# Patient Record
Sex: FEMALE | Race: BLACK OR AFRICAN AMERICAN | NOT HISPANIC OR LATINO | Employment: OTHER | URBAN - METROPOLITAN AREA
[De-identification: names, ages, dates, MRNs, and addresses within clinical notes are randomized per-mention and may not be internally consistent; named-entity substitution may affect disease eponyms.]

---

## 2017-05-03 ENCOUNTER — OFFICE VISIT (OUTPATIENT)
Dept: INTERNAL MEDICINE | Facility: CLINIC | Age: 62
End: 2017-05-03
Payer: COMMERCIAL

## 2017-05-03 VITALS
DIASTOLIC BLOOD PRESSURE: 75 MMHG | HEART RATE: 78 BPM | TEMPERATURE: 98 F | WEIGHT: 260.81 LBS | RESPIRATION RATE: 16 BRPM | HEIGHT: 66 IN | SYSTOLIC BLOOD PRESSURE: 123 MMHG | BODY MASS INDEX: 41.91 KG/M2 | OXYGEN SATURATION: 98 %

## 2017-05-03 DIAGNOSIS — H01.004 BLEPHARITIS OF LEFT UPPER EYELID, UNSPECIFIED TYPE: Primary | ICD-10-CM

## 2017-05-03 DIAGNOSIS — H10.32 ACUTE BACTERIAL CONJUNCTIVITIS OF LEFT EYE: ICD-10-CM

## 2017-05-03 PROCEDURE — 1160F RVW MEDS BY RX/DR IN RCRD: CPT | Mod: S$GLB,,, | Performed by: NURSE PRACTITIONER

## 2017-05-03 PROCEDURE — 99999 PR PBB SHADOW E&M-EST. PATIENT-LVL IV: CPT | Mod: PBBFAC,,, | Performed by: NURSE PRACTITIONER

## 2017-05-03 PROCEDURE — 99203 OFFICE O/P NEW LOW 30 MIN: CPT | Mod: S$GLB,,, | Performed by: NURSE PRACTITIONER

## 2017-05-03 RX ORDER — SULFACETAMIDE SODIUM 100 MG/ML
1 SOLUTION/ DROPS OPHTHALMIC
Qty: 15 ML | Refills: 0 | Status: SHIPPED | OUTPATIENT
Start: 2017-05-03 | End: 2017-05-13

## 2017-05-03 NOTE — PROGRESS NOTES
Subjective:       Patient ID: Concepcion Don is a 61 y.o. female.    Chief Complaint: Belepharitis (lt )    Conjunctivitis   This is a new problem. The current episode started yesterday. The problem occurs constantly. The problem has been rapidly worsening. Associated symptoms include coughing (dry cough x 1 week). Pertinent negatives include no abdominal pain, anorexia, arthralgias, change in bowel habit, chest pain, chills, congestion, diaphoresis, fatigue, fever, headaches, joint swelling, myalgias, nausea, neck pain, numbness, rash, sore throat, swollen glands, urinary symptoms, vertigo, visual change, vomiting or weakness. Nothing aggravates the symptoms. She has tried nothing for the symptoms.     Review of Systems   Constitutional: Negative.  Negative for chills, diaphoresis, fatigue and fever.   HENT: Negative.  Negative for congestion and sore throat.    Eyes: Positive for pain, discharge and redness. Negative for photophobia, itching and visual disturbance.   Respiratory: Positive for cough (dry cough x 1 week). Negative for shortness of breath and wheezing.    Cardiovascular: Negative.  Negative for chest pain and palpitations.   Gastrointestinal: Negative.  Negative for abdominal pain, anorexia, change in bowel habit, nausea and vomiting.   Musculoskeletal: Negative for arthralgias, joint swelling, myalgias and neck pain.   Skin: Negative.  Negative for rash.   Neurological: Negative.  Negative for vertigo, weakness, numbness and headaches.       Objective:      Physical Exam   Constitutional: She is oriented to person, place, and time. She appears well-developed and well-nourished. No distress.   HENT:   Head: Normocephalic and atraumatic.   Right Ear: Hearing, tympanic membrane, external ear and ear canal normal.   Left Ear: Hearing, tympanic membrane, external ear and ear canal normal.   Nose: Nose normal.   Mouth/Throat: Oropharynx is clear and moist.   Eyes: EOM are normal. Pupils are equal, round,  and reactive to light. Right eye exhibits no chemosis, no discharge, no exudate and no hordeolum. No foreign body present in the right eye. Left eye exhibits discharge. Left eye exhibits no chemosis, no exudate and no hordeolum. No foreign body present in the left eye. Right conjunctiva is not injected. Right conjunctiva has no hemorrhage. Left conjunctiva is injected. Left conjunctiva has no hemorrhage. No scleral icterus.   Cardiovascular: Normal rate, regular rhythm, normal heart sounds and intact distal pulses.    Pulmonary/Chest: Effort normal and breath sounds normal. No respiratory distress. She has no wheezes.   Neurological: She is alert and oriented to person, place, and time.   Skin: Skin is warm and dry. No rash noted. She is not diaphoretic.   Psychiatric: She has a normal mood and affect. Her behavior is normal. Judgment and thought content normal.   Vitals reviewed.      Assessment:       1. Blepharitis of left upper eyelid, unspecified type    2. Acute bacterial conjunctivitis of left eye        Plan:       *Blepharitis of left upper eyelid, unspecified type/ Acute bacterial conjunctivitis of left eye  Warm compress Q2 hrs  RTC if not improving over next 24 hours  -     sulfacetamide sodium 10% (BLEPH-10) 10 % ophthalmic solution; Place 1 drop into both eyes every 3 (three) hours.  Dispense: 15 mL; Refill: 0    **

## 2017-05-03 NOTE — MR AVS SNAPSHOT
[facility]  [address] [address]  [place] [address]  Phone: [phone]                  Concepcion Don   5/3/2017 9:20 AM   Office Visit    Description:  Female : 1955   Provider:  NOY Romero,FNP-C   Department:  [facility]           Reason for Visit     Belepharitis           Diagnoses this Visit        Comments    Blepharitis of left upper eyelid, unspecified type    -  Primary     Acute bacterial conjunctivitis of left eye                To Do List           Goals (5 Years of Data)     None       These Medications        Disp Refills Start End    sulfacetamide sodium 10% (BLEPH-10) 10 % ophthalmic solution 15 mL 0 5/3/2017 2017    Place 1 drop into both eyes every 3 (three) hours. - Both Eyes    Pharmacy: [facility] [address] [place] [address] [id number] [place] [address] Ph #: [phone]         [facility]     [facility] Nurse Care Line -  Assistance  Unless otherwise directed by your provider, please contact [facility], our nurse care line that is available for  assistance.     Registered nurses in the [facility] provide: appointment scheduling, clinical advisement, health education, and other advisory services.  Call: [phone] (toll free)               Medications           Message regarding Medications     Verify the changes and/or additions to your medication regime listed below are the same as discussed with your clinician today.  If any of these changes or additions are incorrect, please notify your healthcare provider.        START taking these NEW medications        Refills    sulfacetamide sodium 10% (BLEPH-10) 10 % ophthalmic solution 0    Sig: Place 1 drop into both eyes every 3 (three) hours.    Class: Normal    Route: Both Eyes           Verify that the below list of medications is an accurate representation of the medications you are currently taking.  If none reported, the list may  "be blank. If incorrect, please contact your healthcare provider. Carry this list with you in case of emergency.           Current Medications     benazepril-hydrochlorthiazide (LOTENSIN HCT) 20-12.5 mg per tablet Take 1 tablet by mouth once daily.    levothyroxine (SYNTHROID) 25 MCG tablet Take 25 mcg by mouth once daily.    celecoxib (CELEBREX) 200 MG capsule Take 1 capsule (200 mg total) by mouth once daily.    naproxen sodium (ANAPROX) 550 MG tablet Take 1 tablet (550 mg total) by mouth 2 (two) times daily with meals.    sulfacetamide sodium 10% (BLEPH-10) 10 % ophthalmic solution Place 1 drop into both eyes every 3 (three) hours.           Clinical Reference Information           Your Vitals Were     BP Pulse Temp Resp    123/75 (BP Location: Left arm, Patient Position: Sitting, BP Method: Automatic) 78 97.6 °F (36.4 °C) (Tympanic) 16    Height Weight SpO2 BMI    5' 5.5" (1.664 m) 118.3 kg (260 lb 12.9 oz) 98% 42.74 kg/m2      Blood Pressure          Most Recent Value    BP  123/75      Allergies as of 5/3/2017     No Known Allergies      Immunizations Administered on Date of Encounter - 5/3/2017     None      [facility] Sign-Up     Activating your [facility] account is as easy as 1-2-3!     1) Visit [email or web address], select Sign Up Now, enter this activation code and your date of birth, then select Next.  [email or web address]  Expires: 6/17/2017  9:51 AM      2) Create a username and password to use when you visit [facility] in the future and select a security question in case you lose your password and select Next.    3) Enter your e-mail address and click Sign Up!    Additional Information  If you have questions, please e-mail [email or web address] or call [phone] to talk to our [facility] staff. Remember, [facility] is NOT to be used for urgent needs. For medical emergencies, dial 911.         Instructions      Blepharitis    Blepharitis is an inflammation of the eyelid. It results in swelling of the eyelids, and " it is usually caused by a bacterial infection or a skin condition. Blepharitis is a common eye condition. There are two types. Anterior blepharitis occurs where the eyelashes are attached (outside front edge of the eye). Posterior blepharitis affects the inner edge of the eyelid that touches the eyeball.  In addition to swollen eyelids, symptoms of blepharitis can include thick, yellow, dandruff-like scales that stick to the eyelid. There may be oily patches on the eyelid. The eyelashes may be crusted (with dandruff-like scales) when you wake up from sleeping. The irritated area may itch. The eyelids may be red. The eyes can be red and burn or sting. The eyes may tear a lot, or be dry. You can become sensitive to light or have blurred vision. Symptoms of blepharitis can cause irritability.  Blepharitis is a chronic condition and difficult to cure. Even with successful treatment, recurrences are common. Good hygiene and home treatments (in the Home care section below) can improve your condition.  Causes  Causes of blepharitis may include:  · Problems with the oil glands in the eyelid (meibomian glands)  · Dandruff of the scalp and eyebrows (seborrheic dermatitis)  · Acne rosacea (a skin condition that causes redness of the face, and other symptoms)  · Eyelash mites (tiny organisms in the eyelash follicles)  · Allergic reactions to cosmetics or medicines  Home care  Medicine: The healthcare provider may prescribe an antibiotic eye drops or ointment, artificial tears, and/or steroid eye drops. Follow all instructions for using these medicines. Use all medicines as directed. If you have pain, take medicine as advised by the healthcare provider.  · Wash your hands carefully with soap and warm water before and after caring for your eyes.  · Apply a warm compress or a warm, moist washcloth to the eyelids for 1 minute, 2 to 3 times a day, to loosen the crust. Then, wipe away scales or crust from the eyelids.  · After  applying the warm compress, gently scrub the base of the eyelashes for almost 15 seconds per eyelid. To do this, close your eyes and use a moist eyelid cleansing wipe, clean washcloth, or cotton swab. Ask your healthcare provider about products (such as nonirritating baby shampoo) to use to help clean the eyelids.  · You may be instructed to gently massage your eyelids to help unblock the eyelid glands. Follow all instructions given by the healthcare provider.  · Unless told otherwise, on a regular basis, with eyes closed, clean your eyelids as directed by the healthcare provider. Blepharitis can be an ongoing problem.  · Do not wear eye makeup until the inflammation goes away, or as directed by your healthcare provider.  · Unless told otherwise, stop using contact lenses until you complete treatment for the condition.  · Wash your hands regularly to help prevent dirt and bacteria from coming in contact with your eyelid.  Follow-up care  Follow up with your healthcare provider, or as advised. Your healthcare provider may refer you to an eye specialist (an optometrist or ophthalmologist) for further evaluation and treatment.  When to seek medical advice  Call your healthcare provider right away if any of these occur:  · Increase in redness of the white part of the eye  · Increase in swelling, redness, irritation, or pain of the eyelids  · Eye pain  · Change in vision (trouble seeing or blurring)  · Drainage (pus, blood) from the eyelid  · Fever of 100.4ºF (38ºC) or higher, or as directed by your healthcare provider  Date Last Reviewed: 10/9/2015  © 0400-4421 [employer]. [address] [place] [address], [place] [address]. All rights reserved. This information is not intended as a substitute for professional medical care. Always follow your healthcare professional's instructions.        Conjunctivitis, Nonspecific    The membrane that covers the white part of your eye (the conjunctiva) is inflamed. Inflammation  happens when your body responds to an injury, allergic reaction, infection, or illness. Symptoms of inflammation in the eye may include redness, irritation, itching, swelling, or burning. These symptoms should go away within the next 24 hours. Conjunctivitis may be related to a particle that was in your eye. If so, it may wash out with your tears or irrigation treatment. Being exposed to liquid chemicals or fumes may also cause this reaction.   Home care  · Apply a cold pack (ice in a plastic bag, wrapped in a towel) over the eye for 20 minutes at a time. This will reduce pain.  · Artificial tears may be prescribed to reduce irritation or redness.  These should be used 3 to 4 times a day.  · You may use acetaminophen or ibuprofen to control pain, unless another medicine was prescribed.(Note: If you have chronic liver or kidney disease, or if you have ever had a stomach ulcer or gastrointestinal bleeding, talk with your healthcare provider before using these medicines.)  Follow-up care  Follow up with your healthcare provider, or as advised.  When to seek medical advice  Call your healthcare provider right away if any of these occur:  · Increased eyelid swelling  · Increased eye pain  · Increased redness or drainage from the eye  · Increased blurry vision or increased sensitivity to light  · Failure of normal vision to return within 24 to 48 hours  Date Last Reviewed: 6/14/2015  © 4222-0332 [employer]. [address] [place] [address], [place] [address]. All rights reserved. This information is not intended as a substitute for professional medical care. Always follow your healthcare professional's instructions.             Language Assistance Services     ATTENTION: Language assistance services are available, free of charge. Please call [phone].      ATENCIÓN: Si habla natasha, tiene a ruano disposición servicios gratuitos de asistencia lingüística. Llame al [phone].     SKYE Ý: N?u b?n nói Ti?ng Vi?t,  có các d?ch v? h? tr? ngôn ng? mi?n phí dành cho b?n. G?i s? [phone].         [facility] complies with applicable Federal civil rights laws and does not discriminate on the basis of race, color, national origin, age, disability, or sex.

## 2017-05-03 NOTE — PATIENT INSTRUCTIONS
Blepharitis    Blepharitis is an inflammation of the eyelid. It results in swelling of the eyelids, and it is usually caused by a bacterial infection or a skin condition. Blepharitis is a common eye condition. There are two types. Anterior blepharitis occurs where the eyelashes are attached (outside front edge of the eye). Posterior blepharitis affects the inner edge of the eyelid that touches the eyeball.  In addition to swollen eyelids, symptoms of blepharitis can include thick, yellow, dandruff-like scales that stick to the eyelid. There may be oily patches on the eyelid. The eyelashes may be crusted (with dandruff-like scales) when you wake up from sleeping. The irritated area may itch. The eyelids may be red. The eyes can be red and burn or sting. The eyes may tear a lot, or be dry. You can become sensitive to light or have blurred vision. Symptoms of blepharitis can cause irritability.  Blepharitis is a chronic condition and difficult to cure. Even with successful treatment, recurrences are common. Good hygiene and home treatments (in the Home care section below) can improve your condition.  Causes  Causes of blepharitis may include:  · Problems with the oil glands in the eyelid (meibomian glands)  · Dandruff of the scalp and eyebrows (seborrheic dermatitis)  · Acne rosacea (a skin condition that causes redness of the face, and other symptoms)  · Eyelash mites (tiny organisms in the eyelash follicles)  · Allergic reactions to cosmetics or medicines  Home care  Medicine: The healthcare provider may prescribe an antibiotic eye drops or ointment, artificial tears, and/or steroid eye drops. Follow all instructions for using these medicines. Use all medicines as directed. If you have pain, take medicine as advised by the healthcare provider.  · Wash your hands carefully with soap and warm water before and after caring for your eyes.  · Apply a warm compress or a warm, moist washcloth to the eyelids for 1 minute,  2 to 3 times a day, to loosen the crust. Then, wipe away scales or crust from the eyelids.  · After applying the warm compress, gently scrub the base of the eyelashes for almost 15 seconds per eyelid. To do this, close your eyes and use a moist eyelid cleansing wipe, clean washcloth, or cotton swab. Ask your healthcare provider about products (such as nonirritating baby shampoo) to use to help clean the eyelids.  · You may be instructed to gently massage your eyelids to help unblock the eyelid glands. Follow all instructions given by the healthcare provider.  · Unless told otherwise, on a regular basis, with eyes closed, clean your eyelids as directed by the healthcare provider. Blepharitis can be an ongoing problem.  · Do not wear eye makeup until the inflammation goes away, or as directed by your healthcare provider.  · Unless told otherwise, stop using contact lenses until you complete treatment for the condition.  · Wash your hands regularly to help prevent dirt and bacteria from coming in contact with your eyelid.  Follow-up care  Follow up with your healthcare provider, or as advised. Your healthcare provider may refer you to an eye specialist (an optometrist or ophthalmologist) for further evaluation and treatment.  When to seek medical advice  Call your healthcare provider right away if any of these occur:  · Increase in redness of the white part of the eye  · Increase in swelling, redness, irritation, or pain of the eyelids  · Eye pain  · Change in vision (trouble seeing or blurring)  · Drainage (pus, blood) from the eyelid  · Fever of 100.4ºF (38ºC) or higher, or as directed by your healthcare provider  Date Last Reviewed: 10/9/2015  © 3742-5199 [employer]. [address] [place] [address], [place] [address]. All rights reserved. This information is not intended as a substitute for professional medical care. Always follow your healthcare professional's instructions.        Conjunctivitis,  Nonspecific    The membrane that covers the white part of your eye (the conjunctiva) is inflamed. Inflammation happens when your body responds to an injury, allergic reaction, infection, or illness. Symptoms of inflammation in the eye may include redness, irritation, itching, swelling, or burning. These symptoms should go away within the next 24 hours. Conjunctivitis may be related to a particle that was in your eye. If so, it may wash out with your tears or irrigation treatment. Being exposed to liquid chemicals or fumes may also cause this reaction.   Home care  · Apply a cold pack (ice in a plastic bag, wrapped in a towel) over the eye for 20 minutes at a time. This will reduce pain.  · Artificial tears may be prescribed to reduce irritation or redness.  These should be used 3 to 4 times a day.  · You may use acetaminophen or ibuprofen to control pain, unless another medicine was prescribed.(Note: If you have chronic liver or kidney disease, or if you have ever had a stomach ulcer or gastrointestinal bleeding, talk with your healthcare provider before using these medicines.)  Follow-up care  Follow up with your healthcare provider, or as advised.  When to seek medical advice  Call your healthcare provider right away if any of these occur:  · Increased eyelid swelling  · Increased eye pain  · Increased redness or drainage from the eye  · Increased blurry vision or increased sensitivity to light  · Failure of normal vision to return within 24 to 48 hours  Date Last Reviewed: 6/14/2015  © 8349-2428 [employer]. [address] [place] [address], [place] [address]. All rights reserved. This information is not intended as a substitute for professional medical care. Always follow your healthcare professional's instructions.

## 2017-06-14 ENCOUNTER — OFFICE VISIT (OUTPATIENT)
Dept: INTERNAL MEDICINE | Facility: CLINIC | Age: 62
End: 2017-06-14
Payer: COMMERCIAL

## 2017-06-14 VITALS
WEIGHT: 258.19 LBS | OXYGEN SATURATION: 96 % | BODY MASS INDEX: 43.02 KG/M2 | DIASTOLIC BLOOD PRESSURE: 72 MMHG | RESPIRATION RATE: 20 BRPM | HEIGHT: 65 IN | TEMPERATURE: 98 F | HEART RATE: 87 BPM | SYSTOLIC BLOOD PRESSURE: 102 MMHG

## 2017-06-14 DIAGNOSIS — H00.011 HORDEOLUM EXTERNUM OF RIGHT UPPER EYELID: Primary | ICD-10-CM

## 2017-06-14 PROCEDURE — 99213 OFFICE O/P EST LOW 20 MIN: CPT | Mod: S$GLB,,, | Performed by: NURSE PRACTITIONER

## 2017-06-14 PROCEDURE — 99999 PR PBB SHADOW E&M-EST. PATIENT-LVL IV: CPT | Mod: PBBFAC,,, | Performed by: NURSE PRACTITIONER

## 2017-06-14 RX ORDER — SULFACETAMIDE SODIUM 100 MG/ML
1 SOLUTION/ DROPS OPHTHALMIC
Qty: 15 ML | Refills: 0 | Status: SHIPPED | OUTPATIENT
Start: 2017-06-14 | End: 2017-10-31 | Stop reason: ALTCHOICE

## 2017-06-14 NOTE — PATIENT INSTRUCTIONS
Sty (or Stye)  A sty is an infection of the oil gland of the eyelid. It may develop into a small pocket of pus (abscess). This can cause pain, redness, and swelling. In early stages, styes are treated with antibiotic cream, eye drops, or warm packs (small towels soaked in warm water). More severe cases may need to be opened and drained by a health care provider.  Home care  · Eye drops or ointment are usually prescribed to treat the infection. Use these as directed.   ¨ Artificial tears may also be used to lubricate the eye and make it more comfortable. These may be purchased without a prescription.   ¨ Talk to your health care provider before using any over-the-counter treatment for a sty.  · Apply a warm, damp towel to the affected eye for at least 5 minutes, 3 to 4 times a day for a week. Warm compresses open the pores and speed the healing. If the compresses are too hot, they may burn your eyelid.  · Sometimes the sty will drain with this treatment alone. If this happens, continue the antibiotic until all the redness and swelling are gone.  · Wash your hands before and after touching the infected eye to avoid spreading the infection.  · Do not squeeze or try to puncture the sty.  Follow-up care  Follow up with your health care provider, or as advised.   When to seek medical advice  Call your health care provider right away if you have:  · Increase in swelling or redness around the eyelid after 48 to 72 hours  · Increase in eye pain or the eyelid blisters  · Increase in warmth--the eyelid feels hot  · Drainage of blood or thick pus from the sty  · Blister on the eyelid  · Inability to open the eyelid due to swelling  · Fever  ¨ 1 degree above your normal temperature lasting for 24 to 48 hours, or  ¨ Whatever your health care provider told you to report based on your medical condition  · Vision changes  · Headache or stiff neck  · Recurrence of the sty  Date Last Reviewed: 6/14/2015  © 7109-2572 [employer]. [address] [place] [address], [place] [address]. All rights reserved. This information is not intended as a substitute for professional medical care. Always follow your healthcare professional's instructions.         Discharged

## 2017-06-14 NOTE — PROGRESS NOTES
Subjective:       Patient ID: Concepcion Don is a 61 y.o. female.    Chief Complaint: Stye (to R eye since Monday. Pt placed a warm compress last night and noted some drainage. Denies pain. )    Conjunctivitis   This is a new problem. The current episode started yesterday. The problem occurs constantly. The problem has been gradually worsening. Pertinent negatives include no congestion, coughing, diaphoresis, fatigue, fever, headaches, nausea, rash, sore throat, swollen glands or visual change. Nothing aggravates the symptoms. Treatments tried: warm compress. The treatment provided mild relief.     Review of Systems   Constitutional: Negative for diaphoresis, fatigue and fever.   HENT: Negative for congestion and sore throat.    Respiratory: Negative for cough.    Gastrointestinal: Negative for nausea.   Skin: Negative for rash.   Neurological: Negative for headaches.       Objective:      Physical Exam   Constitutional: She is oriented to person, place, and time. She appears well-developed. No distress.   obese   HENT:   Head: Normocephalic.   Right Ear: External ear normal.   Left Ear: External ear normal.   Nose: Nose normal.   Mouth/Throat: Oropharynx is clear and moist. No oropharyngeal exudate.   Eyes: EOM are normal. Pupils are equal, round, and reactive to light. Lids are everted and swept, no foreign bodies found. Right eye exhibits discharge and hordeolum. Right eye exhibits no exudate. Left eye exhibits no chemosis, no exudate and no hordeolum. Right conjunctiva is injected. Left conjunctiva is not injected. Left conjunctiva has no hemorrhage.   Neck: Normal range of motion.   Lymphadenopathy:     She has no cervical adenopathy.   Neurological: She is alert and oriented to person, place, and time.   Skin: Skin is warm and dry. No rash noted. She is not diaphoretic.       Assessment:       1. Hordeolum externum of right upper eyelid        Plan:       *Hordeolum externum of right upper eyelid  Warm compress  for 10 mins TID prior to drops  RTC if not improving in next 24 hours  -     sulfacetamide sodium 10% (BLEPH-10) 10 % ophthalmic solution; Place 1 drop into the right eye every 2 (two) hours.  Dispense: 15 mL; Refill: 0    **

## 2017-08-28 ENCOUNTER — TELEPHONE (OUTPATIENT)
Dept: URGENT CARE | Facility: CLINIC | Age: 62
End: 2017-08-28

## 2017-10-31 ENCOUNTER — OFFICE VISIT (OUTPATIENT)
Dept: INTERNAL MEDICINE | Facility: CLINIC | Age: 62
End: 2017-10-31
Payer: COMMERCIAL

## 2017-10-31 VITALS
WEIGHT: 264.31 LBS | RESPIRATION RATE: 18 BRPM | HEIGHT: 66 IN | SYSTOLIC BLOOD PRESSURE: 110 MMHG | BODY MASS INDEX: 42.48 KG/M2 | DIASTOLIC BLOOD PRESSURE: 72 MMHG | OXYGEN SATURATION: 98 % | TEMPERATURE: 96 F | HEART RATE: 74 BPM

## 2017-10-31 DIAGNOSIS — Z28.9 DELAYED IMMUNIZATIONS: ICD-10-CM

## 2017-10-31 DIAGNOSIS — I10 ESSENTIAL HYPERTENSION: ICD-10-CM

## 2017-10-31 DIAGNOSIS — J01.00 ACUTE NON-RECURRENT MAXILLARY SINUSITIS: Primary | ICD-10-CM

## 2017-10-31 PROCEDURE — 99214 OFFICE O/P EST MOD 30 MIN: CPT | Mod: 25,S$GLB,, | Performed by: FAMILY MEDICINE

## 2017-10-31 PROCEDURE — 90686 IIV4 VACC NO PRSV 0.5 ML IM: CPT | Mod: S$GLB,,, | Performed by: FAMILY MEDICINE

## 2017-10-31 PROCEDURE — 90471 IMMUNIZATION ADMIN: CPT | Mod: S$GLB,,, | Performed by: FAMILY MEDICINE

## 2017-10-31 PROCEDURE — 99999 PR PBB SHADOW E&M-EST. PATIENT-LVL III: CPT | Mod: PBBFAC,,, | Performed by: FAMILY MEDICINE

## 2017-10-31 RX ORDER — AMOXICILLIN AND CLAVULANATE POTASSIUM 875; 125 MG/1; MG/1
1 TABLET, FILM COATED ORAL EVERY 12 HOURS
Qty: 14 TABLET | Refills: 0 | Status: SHIPPED | OUTPATIENT
Start: 2017-10-31 | End: 2017-11-29 | Stop reason: ALTCHOICE

## 2017-10-31 NOTE — ASSESSMENT & PLAN NOTE
Augmentin, mucinex -d, nyquil.  Watch bp.  Rest, Increase Fluids, Tylenol or Motrin for fever or aches  Warm Compresses Over Sinuses Twice a Day

## 2017-10-31 NOTE — PROGRESS NOTES
Subjective:       Patient ID: Concepcion Don is a 62 y.o. female.    Chief Complaint: Sinus Problem; Headache; and Otalgia    Sinus Problem   This is a new problem. The current episode started 1 to 4 weeks ago. The problem has been gradually worsening since onset. There has been no fever. The pain is moderate. Associated symptoms include congestion, ear pain, headaches, sinus pressure and a sore throat. Pertinent negatives include no chills, coughing, neck pain or shortness of breath. Treatments tried: allegra.   Headache    Associated symptoms include ear pain, sinus pressure and a sore throat. Pertinent negatives include no coughing or neck pain.   Otalgia    Associated symptoms include headaches and a sore throat. Pertinent negatives include no coughing or neck pain.     Review of Systems   Constitutional: Negative for chills.   HENT: Positive for congestion, ear pain, sinus pressure and sore throat.    Respiratory: Negative for cough and shortness of breath.    Musculoskeletal: Negative for neck pain.   Neurological: Positive for headaches.       Objective:      Physical Exam   Constitutional: She appears well-developed and well-nourished. She appears distressed.   HENT:   Head: Normocephalic and atraumatic.   Right Ear: Hearing, tympanic membrane and ear canal normal.   Left Ear: Hearing, tympanic membrane and ear canal normal.   Nose: Nose normal.   Mouth/Throat: Oropharynx is clear and moist.   ttp maxillary and frontal sinuses   Pulmonary/Chest: Effort normal and breath sounds normal. No respiratory distress. She has no wheezes.   Skin: Skin is warm and dry. No rash noted. She is not diaphoretic. No erythema.   Nursing note and vitals reviewed.      Assessment:       1. Acute non-recurrent maxillary sinusitis    2. Essential hypertension    3. Class 3 obesity due to excess calories with serious comorbidity and body mass index (BMI) of 40.0 to 44.9 in adult    4. Delayed immunizations        Plan:            Acute non-recurrent maxillary sinusitis  Augmentin, mucinex -d, nyquil.  Watch bp.  Rest, Increase Fluids, Tylenol or Motrin for fever or aches  Warm Compresses Over Sinuses Twice a Day

## 2017-11-13 ENCOUNTER — PATIENT OUTREACH (OUTPATIENT)
Dept: ADMINISTRATIVE | Facility: HOSPITAL | Age: 62
End: 2017-11-13

## 2017-11-16 ENCOUNTER — PATIENT OUTREACH (OUTPATIENT)
Dept: ADMINISTRATIVE | Facility: HOSPITAL | Age: 62
End: 2017-11-16

## 2017-11-20 RX ORDER — LEVOTHYROXINE SODIUM 25 UG/1
25 TABLET ORAL DAILY
Qty: 90 TABLET | Refills: 0 | Status: SHIPPED | OUTPATIENT
Start: 2017-11-20 | End: 2018-04-27 | Stop reason: SDUPTHER

## 2017-11-20 NOTE — TELEPHONE ENCOUNTER
----- Message from Lori Rhodes sent at 11/20/2017  8:05 AM CST -----  Contact: pt  The pt request a call concerning a medication refill, pt can be reached at [phone]///[id number]

## 2017-11-22 ENCOUNTER — PATIENT OUTREACH (OUTPATIENT)
Dept: ADMINISTRATIVE | Facility: HOSPITAL | Age: 62
End: 2017-11-22

## 2017-11-29 ENCOUNTER — OFFICE VISIT (OUTPATIENT)
Dept: INTERNAL MEDICINE | Facility: CLINIC | Age: 62
End: 2017-11-29
Payer: COMMERCIAL

## 2017-11-29 ENCOUNTER — LAB VISIT (OUTPATIENT)
Dept: LAB | Facility: HOSPITAL | Age: 62
End: 2017-11-29
Attending: FAMILY MEDICINE
Payer: COMMERCIAL

## 2017-11-29 VITALS
DIASTOLIC BLOOD PRESSURE: 80 MMHG | RESPIRATION RATE: 18 BRPM | SYSTOLIC BLOOD PRESSURE: 110 MMHG | OXYGEN SATURATION: 99 % | BODY MASS INDEX: 42.41 KG/M2 | HEART RATE: 67 BPM | TEMPERATURE: 96 F | HEIGHT: 66 IN | WEIGHT: 263.88 LBS

## 2017-11-29 DIAGNOSIS — G89.29 CHRONIC PAIN OF LEFT KNEE: ICD-10-CM

## 2017-11-29 DIAGNOSIS — Z12.31 SCREENING MAMMOGRAM, ENCOUNTER FOR: ICD-10-CM

## 2017-11-29 DIAGNOSIS — Z28.9 DELAYED IMMUNIZATIONS: ICD-10-CM

## 2017-11-29 DIAGNOSIS — M25.562 CHRONIC PAIN OF LEFT KNEE: ICD-10-CM

## 2017-11-29 DIAGNOSIS — Z00.00 ROUTINE GENERAL MEDICAL EXAMINATION AT A HEALTH CARE FACILITY: ICD-10-CM

## 2017-11-29 DIAGNOSIS — E07.9 THYROID DISEASE: ICD-10-CM

## 2017-11-29 DIAGNOSIS — Z78.0 POSTMENOPAUSAL: ICD-10-CM

## 2017-11-29 DIAGNOSIS — Z00.00 ROUTINE GENERAL MEDICAL EXAMINATION AT A HEALTH CARE FACILITY: Primary | ICD-10-CM

## 2017-11-29 LAB
ALBUMIN SERPL BCP-MCNC: 3.3 G/DL
ALP SERPL-CCNC: 110 U/L
ALT SERPL W/O P-5'-P-CCNC: 25 U/L
ANION GAP SERPL CALC-SCNC: 9 MMOL/L
AST SERPL-CCNC: 32 U/L
BASOPHILS # BLD AUTO: 0.02 K/UL
BASOPHILS NFR BLD: 0.3 %
BILIRUB SERPL-MCNC: 0.6 MG/DL
BUN SERPL-MCNC: 17 MG/DL
CALCIUM SERPL-MCNC: 9.3 MG/DL
CHLORIDE SERPL-SCNC: 105 MMOL/L
CHOLEST SERPL-MCNC: 182 MG/DL
CHOLEST/HDLC SERPL: 4.2 {RATIO}
CO2 SERPL-SCNC: 26 MMOL/L
CREAT SERPL-MCNC: 0.8 MG/DL
DIFFERENTIAL METHOD: ABNORMAL
EOSINOPHIL # BLD AUTO: 0.1 K/UL
EOSINOPHIL NFR BLD: 1.6 %
ERYTHROCYTE [DISTWIDTH] IN BLOOD BY AUTOMATED COUNT: 14.7 %
EST. GFR  (AFRICAN AMERICAN): >60 ML/MIN/1.73 M^2
EST. GFR  (NON AFRICAN AMERICAN): >60 ML/MIN/1.73 M^2
GLUCOSE SERPL-MCNC: 95 MG/DL
HCT VFR BLD AUTO: 37.7 %
HDLC SERPL-MCNC: 43 MG/DL
HDLC SERPL: 23.6 %
HGB BLD-MCNC: 12 G/DL
LDLC SERPL CALC-MCNC: 123.6 MG/DL
LYMPHOCYTES # BLD AUTO: 2.9 K/UL
LYMPHOCYTES NFR BLD: 41.1 %
MCH RBC QN AUTO: 27.8 PG
MCHC RBC AUTO-ENTMCNC: 31.8 G/DL
MCV RBC AUTO: 88 FL
MONOCYTES # BLD AUTO: 0.8 K/UL
MONOCYTES NFR BLD: 11.6 %
NEUTROPHILS # BLD AUTO: 3.2 K/UL
NEUTROPHILS NFR BLD: 45.3 %
NONHDLC SERPL-MCNC: 139 MG/DL
PLATELET # BLD AUTO: 222 K/UL
PMV BLD AUTO: 10.6 FL
POTASSIUM SERPL-SCNC: 4.3 MMOL/L
PROT SERPL-MCNC: 8.5 G/DL
RBC # BLD AUTO: 4.31 M/UL
SODIUM SERPL-SCNC: 140 MMOL/L
TRIGL SERPL-MCNC: 77 MG/DL
TSH SERPL DL<=0.005 MIU/L-ACNC: 1.48 UIU/ML
WBC # BLD AUTO: 6.98 K/UL

## 2017-11-29 PROCEDURE — 90471 IMMUNIZATION ADMIN: CPT | Mod: S$GLB,,, | Performed by: FAMILY MEDICINE

## 2017-11-29 PROCEDURE — 36415 COLL VENOUS BLD VENIPUNCTURE: CPT | Mod: PO

## 2017-11-29 PROCEDURE — 99999 PR PBB SHADOW E&M-EST. PATIENT-LVL IV: CPT | Mod: PBBFAC,,, | Performed by: FAMILY MEDICINE

## 2017-11-29 PROCEDURE — 90715 TDAP VACCINE 7 YRS/> IM: CPT | Mod: S$GLB,,, | Performed by: FAMILY MEDICINE

## 2017-11-29 PROCEDURE — 85025 COMPLETE CBC W/AUTO DIFF WBC: CPT | Mod: PO

## 2017-11-29 PROCEDURE — 99396 PREV VISIT EST AGE 40-64: CPT | Mod: 25,S$GLB,, | Performed by: FAMILY MEDICINE

## 2017-11-29 PROCEDURE — 80061 LIPID PANEL: CPT

## 2017-11-29 PROCEDURE — 80053 COMPREHEN METABOLIC PANEL: CPT | Mod: PO

## 2017-11-29 PROCEDURE — 84443 ASSAY THYROID STIM HORMONE: CPT | Mod: PO

## 2017-11-29 PROCEDURE — 86803 HEPATITIS C AB TEST: CPT

## 2017-11-29 RX ORDER — ASPIRIN 81 MG/1
81 TABLET ORAL DAILY
Qty: 365 TABLET | Refills: 0 | Status: SHIPPED | OUTPATIENT
Start: 2017-11-29 | End: 2017-12-07

## 2017-11-29 RX ORDER — CALCIUM CARBONATE 500(1250)
2 TABLET ORAL DAILY
Qty: 180 TABLET | Refills: 3 | Status: SHIPPED | OUTPATIENT
Start: 2017-11-29 | End: 2019-05-15

## 2017-11-29 NOTE — PROGRESS NOTES
Subjective:       Patient ID: Concepcion Don is a 62 y.o. female.    Chief Complaint: Annual Exam and Nasal Congestion    Subjective:     Concepcion Don is a 62 y.o. female and is here for a comprehensive physical exam. The patient reports problems - knee pain.    Do you take any herbs or supplements that were not prescribed by a doctor? no  Are you taking calcium supplements? no  Are you taking aspirin daily? yes     History:  Any STD's in the past? none    The following portions of the patient's history were reviewed and updated as appropriate: allergies, current medications, past family history, past medical history, past social history, past surgical history and problem list.    Review of Systems  Do you have pain that bothers you in your daily life? Knee pain, had synvisc in past.  Pertinent items are noted in HPI.    2. Patient Counseling:  --Nutrition: Stressed importance of moderation in sodium/caffeine intake, saturated fat and cholesterol, caloric balance, sufficient intake of fresh fruits, vegetables, fiber, calcium, iron, and 1 mg of folate supplement per day (for females capable of pregnancy).  --Discussed the issue of estrogen replacement, calcium supplement, and the daily use of baby aspirin.  --Exercise: Stressed the importance of regular exercise.   --Substance Abuse: Discussed cessation/primary prevention of tobacco, alcohol, or other drug use; driving or other dangerous activities under the influence; availability of treatment for abuse.    --Sexuality: Discussed sexually transmitted diseases, partner selection, use of condoms, avoidance of unintended pregnancy  and contraceptive alternatives.   --Injury prevention: Discussed safety belts, safety helmets, smoke detector, smoking near bedding or upholstery.   --Dental health: Discussed importance of regular tooth brushing, flossing, and dental visits.  --Immunizations reviewed.  --Discussed benefits of screening colonoscopy.  --After hours service  discussed with patient    3. Discussed the patient's BMI with her.  The BMI elevated, The patient is asked to make an attempt to improve diet and exercise patterns to aid in medical management of this problem.  .  4. Follow up as needed for acute illness        Review of Systems   Respiratory: Negative for shortness of breath.    Cardiovascular: Negative for chest pain.   Gastrointestinal: Negative for abdominal pain.       Objective:      Physical Exam   Constitutional: She appears well-developed and well-nourished. No distress.   HENT:   Head: Normocephalic and atraumatic.   Nose: Nose normal.   Mouth/Throat: Oropharynx is clear and moist.   Eyes: EOM are normal. Pupils are equal, round, and reactive to light.   Cardiovascular: Normal rate, regular rhythm, normal heart sounds and intact distal pulses.    Pulmonary/Chest: Effort normal and breath sounds normal. No respiratory distress. She has no wheezes.   Musculoskeletal: Normal range of motion. She exhibits no edema.   Neurological: She is alert.   Skin: Skin is warm and dry. No rash noted. She is not diaphoretic. No erythema.   Nursing note and vitals reviewed.      Assessment:       1. Routine general medical examination at a health care facility    2. Delayed immunizations    3. Postmenopausal    4. Thyroid disease    5. Screening mammogram, encounter for    6. Chronic pain of left knee        Plan:           Routine general medical examination at a health care facility  Vit d 2000 iu per day. 1200 mg of calcium daily  See note    Chronic pain of left knee  Chronic issue.  After long d/w pt, she will f/u in about a week to discuss weight bearing xr, and need for injection versus if she wishes a referral to ortho for synvisc vs surgery.    Routine general medical examination at a health care facility  -     Hepatitis C antibody; Future; Expected date: 11/29/2017  -     Lipid panel; Future; Expected date: 11/29/2017  -     CBC auto differential; Future;  Expected date: 11/29/2017  -     Comprehensive metabolic panel; Future; Expected date: 11/29/2017    Delayed immunizations  -     Tdap Vaccine    Postmenopausal  -     DXA Bone Density Spine And Hip; Future    Thyroid disease  -     TSH; Future; Expected date: 11/29/2017    Screening mammogram, encounter for  -     Mammo Digital Screening Bilat without CA; Future; Expected date: 11/29/2017    Chronic pain of left knee    Other orders  -     Cancel: Lipid panel; Future  -     Cancel: Hepatitis C antibody; Future  -     Cancel: Mammo Digital Screening Bilat with CAD; Future  -     calcium carbonate (OS-JENNIFER) 500 mg calcium (1,250 mg) tablet; Take 2 tablets (1,000 mg total) by mouth once daily.  Dispense: 180 tablet; Refill: 3  -     aspirin (ECOTRIN) 81 MG EC tablet; Take 1 tablet (81 mg total) by mouth once daily.  Dispense: 365 tablet; Refill: 0

## 2017-11-30 LAB — HCV AB SERPL QL IA: NEGATIVE

## 2017-12-07 ENCOUNTER — HOSPITAL ENCOUNTER (OUTPATIENT)
Dept: RADIOLOGY | Facility: HOSPITAL | Age: 62
Discharge: HOME | End: 2017-12-07
Attending: FAMILY MEDICINE
Payer: COMMERCIAL

## 2017-12-07 ENCOUNTER — OFFICE VISIT (OUTPATIENT)
Dept: INTERNAL MEDICINE | Facility: CLINIC | Age: 62
End: 2017-12-07
Payer: COMMERCIAL

## 2017-12-07 VITALS
BODY MASS INDEX: 42.66 KG/M2 | HEIGHT: 66 IN | WEIGHT: 265.44 LBS | SYSTOLIC BLOOD PRESSURE: 100 MMHG | RESPIRATION RATE: 18 BRPM | DIASTOLIC BLOOD PRESSURE: 72 MMHG | OXYGEN SATURATION: 98 % | TEMPERATURE: 97 F | HEART RATE: 66 BPM

## 2017-12-07 DIAGNOSIS — G89.29 CHRONIC PAIN OF LEFT KNEE: ICD-10-CM

## 2017-12-07 DIAGNOSIS — M25.562 CHRONIC PAIN OF LEFT KNEE: ICD-10-CM

## 2017-12-07 DIAGNOSIS — Z28.9 DELAYED IMMUNIZATIONS: ICD-10-CM

## 2017-12-07 DIAGNOSIS — I10 ESSENTIAL HYPERTENSION: Primary | ICD-10-CM

## 2017-12-07 PROCEDURE — 20610 DRAIN/INJ JOINT/BURSA W/O US: CPT | Mod: LT,S$GLB,, | Performed by: FAMILY MEDICINE

## 2017-12-07 PROCEDURE — 90736 HZV VACCINE LIVE SUBQ: CPT | Mod: S$GLB,,, | Performed by: FAMILY MEDICINE

## 2017-12-07 PROCEDURE — 99999 PR PBB SHADOW E&M-EST. PATIENT-LVL III: CPT | Mod: PBBFAC,,, | Performed by: FAMILY MEDICINE

## 2017-12-07 PROCEDURE — 99214 OFFICE O/P EST MOD 30 MIN: CPT | Mod: 25,S$GLB,, | Performed by: FAMILY MEDICINE

## 2017-12-07 PROCEDURE — 73562 X-RAY EXAM OF KNEE 3: CPT | Mod: 26,50,, | Performed by: RADIOLOGY

## 2017-12-07 PROCEDURE — 90471 IMMUNIZATION ADMIN: CPT | Mod: S$GLB,,, | Performed by: FAMILY MEDICINE

## 2017-12-07 PROCEDURE — 73562 X-RAY EXAM OF KNEE 3: CPT | Mod: TC,50,PO

## 2017-12-07 RX ORDER — BENAZEPRIL HYDROCHLORIDE AND HYDROCHLOROTHIAZIDE 20; 12.5 MG/1; MG/1
1 TABLET ORAL DAILY
Qty: 90 TABLET | Refills: 3 | Status: SHIPPED | OUTPATIENT
Start: 2017-12-07 | End: 2018-12-27 | Stop reason: SDUPTHER

## 2017-12-07 RX ORDER — BETAMETHASONE SODIUM PHOSPHATE AND BETAMETHASONE ACETATE 3; 3 MG/ML; MG/ML
6 INJECTION, SUSPENSION INTRA-ARTICULAR; INTRALESIONAL; INTRAMUSCULAR; SOFT TISSUE
Status: DISCONTINUED | OUTPATIENT
Start: 2017-12-07 | End: 2017-12-08 | Stop reason: HOSPADM

## 2017-12-07 RX ORDER — ASPIRIN 325 MG
81 TABLET ORAL DAILY
COMMUNITY
End: 2019-04-10

## 2017-12-07 RX ADMIN — BETAMETHASONE SODIUM PHOSPHATE AND BETAMETHASONE ACETATE 6 MG: 3; 3 INJECTION, SUSPENSION INTRA-ARTICULAR; INTRALESIONAL; INTRAMUSCULAR; SOFT TISSUE at 04:12

## 2017-12-07 NOTE — PROGRESS NOTES
Subjective:       Patient ID: Concepcion Don is a 62 y.o. female.    Chief Complaint: Follow-up (left knee pain)    HTN: chronic, stable, compliant on meds,  Denies blurred vision or headache.      Knee Pain    Incident onset: chronic knee pain. There was no injury mechanism. The pain is present in the left knee. The quality of the pain is described as aching. The pain is moderate. The pain has been fluctuating since onset. Pertinent negatives include no inability to bear weight, loss of motion, loss of sensation, muscle weakness, numbness or tingling. She reports no foreign bodies present. The symptoms are aggravated by movement and weight bearing. She has tried acetaminophen for the symptoms. The treatment provided moderate relief.     Review of Systems   Constitutional: Negative for activity change and unexpected weight change.   HENT: Negative for hearing loss, rhinorrhea and trouble swallowing.    Eyes: Negative for discharge and visual disturbance.   Respiratory: Negative for chest tightness and wheezing.    Cardiovascular: Negative for chest pain and palpitations.   Gastrointestinal: Negative for blood in stool, constipation, diarrhea and vomiting.   Endocrine: Negative for polydipsia and polyuria.   Genitourinary: Negative for difficulty urinating, dysuria, hematuria and menstrual problem.   Musculoskeletal: Positive for arthralgias. Negative for joint swelling and neck pain.   Neurological: Negative for tingling, weakness, numbness and headaches.   Psychiatric/Behavioral: Negative for confusion and dysphoric mood.       Objective:      Physical Exam   Constitutional: She appears well-developed and well-nourished. No distress.   HENT:   Head: Normocephalic and atraumatic.   Nose: Nose normal.   Mouth/Throat: Oropharynx is clear and moist.   Pulmonary/Chest: Effort normal and breath sounds normal. No respiratory distress. She has no wheezes.   Musculoskeletal:   Left knee is nontender to palpation.  No clicks  or clunks noted on exam   Skin: Skin is warm and dry. No rash noted. She is not diaphoretic. No erythema.   Nursing note and vitals reviewed.      Assessment:       1. Delayed immunizations    2. Chronic pain of left knee        Plan:           No problem-specific Assessment & Plan notes found for this encounter.    Delayed immunizations  -     (In Office Administered) Zoster Vaccine - Live    Chronic pain of left knee  -     Cancel: X-Ray Knee AP Standing Bilateral; Future; Expected date: 12/07/2017  -     Large Joint Aspiration/Injection  -     betamethasone acetate-betamethasone sodium phosphate injection 6 mg; Inject 6 mg into the articular space.    Other orders  -     benazepril-hydrochlorthiazide (LOTENSIN HCT) 20-12.5 mg per tablet; Take 1 tablet by mouth once daily.  Dispense: 90 tablet; Refill: 3

## 2017-12-07 NOTE — PROCEDURES
Large Joint Aspiration/Injection  Date/Time: 12/7/2017 4:20 PM  Performed by: GUS GOLD  Authorized by: GUS GOLD     Consent Done?:  Yes (Written)  Indications:  Pain  Procedure site marked: Yes    Timeout: Prior to procedure the correct patient, procedure, and site was verified      Location:  Knee  Site:  L knee  Prep: Patient was prepped and draped in usual sterile fashion    Ultrasonic Guidance for needle placement: No  Needle size:  22 G  Approach:  Anteromedial  Medications:  6 mg betamethasone acetate-betamethasone sodium phosphate 6 mg/mL  Patient tolerance:  Patient tolerated the procedure well with no immediate complications

## 2018-01-10 ENCOUNTER — APPOINTMENT (OUTPATIENT)
Dept: RADIOLOGY | Facility: CLINIC | Age: 63
End: 2018-01-10
Attending: FAMILY MEDICINE
Payer: COMMERCIAL

## 2018-01-10 DIAGNOSIS — Z78.0 POSTMENOPAUSAL: ICD-10-CM

## 2018-01-10 PROCEDURE — 77080 DXA BONE DENSITY AXIAL: CPT | Mod: 26,,, | Performed by: RADIOLOGY

## 2018-01-10 PROCEDURE — 77080 DXA BONE DENSITY AXIAL: CPT | Mod: TC,PO

## 2018-02-05 ENCOUNTER — OFFICE VISIT (OUTPATIENT)
Dept: INTERNAL MEDICINE | Facility: CLINIC | Age: 63
End: 2018-02-05
Payer: COMMERCIAL

## 2018-02-05 VITALS
DIASTOLIC BLOOD PRESSURE: 62 MMHG | TEMPERATURE: 96 F | SYSTOLIC BLOOD PRESSURE: 112 MMHG | OXYGEN SATURATION: 98 % | BODY MASS INDEX: 47.42 KG/M2 | RESPIRATION RATE: 18 BRPM | HEIGHT: 62 IN | WEIGHT: 257.69 LBS | HEART RATE: 96 BPM

## 2018-02-05 DIAGNOSIS — J00 ACUTE NASOPHARYNGITIS (COMMON COLD): Primary | ICD-10-CM

## 2018-02-05 DIAGNOSIS — I10 ESSENTIAL HYPERTENSION: ICD-10-CM

## 2018-02-05 PROBLEM — J01.00 ACUTE NON-RECURRENT MAXILLARY SINUSITIS: Status: RESOLVED | Noted: 2017-10-31 | Resolved: 2018-02-05

## 2018-02-05 PROCEDURE — 3008F BODY MASS INDEX DOCD: CPT | Mod: S$GLB,,, | Performed by: NURSE PRACTITIONER

## 2018-02-05 PROCEDURE — 99999 PR PBB SHADOW E&M-EST. PATIENT-LVL IV: CPT | Mod: PBBFAC,,, | Performed by: NURSE PRACTITIONER

## 2018-02-05 PROCEDURE — 99214 OFFICE O/P EST MOD 30 MIN: CPT | Mod: S$GLB,,, | Performed by: NURSE PRACTITIONER

## 2018-02-05 RX ORDER — IBUPROFEN 200 MG
400 TABLET ORAL EVERY 8 HOURS PRN
COMMUNITY
Start: 2018-02-05 | End: 2018-02-15

## 2018-02-05 RX ORDER — ACETAMINOPHEN 500 MG
500 TABLET ORAL EVERY 6 HOURS PRN
Refills: 0 | COMMUNITY
Start: 2018-02-05

## 2018-02-05 NOTE — PROGRESS NOTES
Subjective:       Patient ID: Cocnepcion Don is a 62 y.o. female.    Chief Complaint: Cough; Nasal Congestion; and Oral Allergy Syndrome    Cough   This is a new problem. The current episode started in the past 7 days. The problem has been gradually worsening. The problem occurs every few minutes. The cough is non-productive. Associated symptoms include chills, ear congestion, nasal congestion, postnasal drip, rhinorrhea, a sore throat and sweats. Pertinent negatives include no chest pain, ear pain, eye redness, fever, headaches, heartburn, hemoptysis, myalgias, rash, shortness of breath or weight loss. Nothing aggravates the symptoms. Treatments tried: mucinex-DM, flonase, delsyum. The treatment provided moderate relief. Her past medical history is significant for environmental allergies.     Review of Systems   Constitutional: Positive for chills and fatigue. Negative for appetite change, diaphoresis, fever and weight loss.   HENT: Positive for congestion, postnasal drip, rhinorrhea and sore throat. Negative for ear discharge and ear pain.    Eyes: Positive for itching. Negative for pain, discharge and redness.   Respiratory: Positive for cough. Negative for hemoptysis, chest tightness and shortness of breath.    Cardiovascular: Negative for chest pain.   Gastrointestinal: Negative.  Negative for heartburn.   Musculoskeletal: Negative for arthralgias and myalgias.   Skin: Negative for rash.   Allergic/Immunologic: Positive for environmental allergies. Negative for immunocompromised state.   Neurological: Negative for headaches.   Hematological: Negative.        Objective:      Physical Exam   Constitutional: She is oriented to person, place, and time. Vital signs are normal. She appears well-developed and well-nourished. No distress.   HENT:   Head: Normocephalic and atraumatic.   Right Ear: Hearing, external ear and ear canal normal. A middle ear effusion is present.   Left Ear: Hearing, external ear and ear canal  normal. A middle ear effusion is present.   Nose: Mucosal edema and rhinorrhea present. Right sinus exhibits no maxillary sinus tenderness and no frontal sinus tenderness. Left sinus exhibits no maxillary sinus tenderness and no frontal sinus tenderness.   Mouth/Throat: Posterior oropharyngeal edema and posterior oropharyngeal erythema present. No oropharyngeal exudate. No tonsillar exudate.   Eyes: Conjunctivae are normal. Pupils are equal, round, and reactive to light. Right eye exhibits no discharge. Left eye exhibits no discharge.   Neck: Normal range of motion. Neck supple.   Cardiovascular: Normal rate and regular rhythm.    Pulmonary/Chest: Effort normal and breath sounds normal. No respiratory distress. She has no wheezes.   Abdominal: Soft. Bowel sounds are normal. She exhibits no distension. There is no tenderness.   Lymphadenopathy:     She has no cervical adenopathy.   Neurological: She is alert and oriented to person, place, and time.   Skin: Skin is warm and dry. No rash noted. She is not diaphoretic.   Psychiatric: She has a normal mood and affect. Her behavior is normal.       Assessment:       1. Acute nasopharyngitis (common cold)        Plan:     Problem List Items Addressed This Visit        Cardiac/Vascular    Hypertension    Current Assessment & Plan     Discussed supportive home care including rest, hydration, hot fluids with honey and tylenol/motrin for sore throat and body aches. Handout given. Pt verbalizes understanding.  RTC 3-5 days if not improving           Other Visit Diagnoses     Acute nasopharyngitis (common cold)    -  Primary    Relevant Medications    acetaminophen (TYLENOL) 500 MG tablet    ibuprofen (ADVIL,MOTRIN) 200 MG tablet

## 2018-02-05 NOTE — PATIENT INSTRUCTIONS
Adult Self-Care for Colds  Colds are caused by viruses. They can't be cured with antibiotics. However, you can ease symptoms and support your body's efforts to heal itself.  No matter which symptoms you have, be sure to:  · Drink plenty of fluids (water or clear soup)  · Stop smoking and drinking alcohol  · Get plenty of rest    Understand a fever  · Take your temperature several times a day. If your fever is 100.4°F (38.0°C) for more than a day, call your healthcare provider.  · Relax, lie down. Go to bed if you want. Just get off your feet and rest. Also, drink plenty of fluids to avoid dehydration.  · Take acetaminophen or a nonsteroidal anti-inflammatory agent (NSAID), such as ibuprofen.  Treat a troubled nose kindly  · Breathe steam or heated humidified air to open blocked nasal passages.  a hot shower or use a vaporizer. Be careful not to get burned by the steam.  · Saline nasal sprays and decongestant tablets help open a stuffy nose. Antihistamines can also help, but they can cause side effects such as drowsiness and drying of the eyes, nose, and mouth.  Soothe a sore throat and cough  · Gargle every 2 hours with 1/4 teaspoon of salt dissolved in 1/2 cup of warm water. Suck on throat lozenges and cough drops to moisten your throat.  · Cough medicines are available but it is unclear how well they actually work.  · Take acetaminophen or an NSAID, such as ibuprofen, to ease throat pain  Ease digestive problems  · Put fluids back into your body. Take frequent sips of clear liquids such as water or broth. Avoid drinks that have a lot of sugar in them, such as juices and sodas. These can make diarrhea worse. Older children and adults can drink sports drinks.  · As your appetite returns, you can resume your normal diet. Ask your healthcare provider if there are any foods you should avoid.  When to seek medical care  When you first notice symptoms, ask your healthcare provider if antiviral medicines are  appropriate. Antibiotics should not be taken for colds or flu. Also, call your healthcare provider if you have any of the following symptoms or if you aren't feeling better after 7 days:  · Shortness of breath  · Pain or pressure in the chest or belly (abdomen)  · Worsening symptoms, especially after a period of improvement  · Fever of 100.4°F  (38.0°C) or higher, or fever that doesn't go down with medicine  · Sudden dizziness or confusion  · Severe or continued vomiting  · Signs of dehydration, including extreme thirst, dark urine, infrequent urination, dry mouth  · Spotted, red, or very sore throat   Date Last Reviewed: 12/1/2016 © 2000-2017 [employer]. [address] [place] [address], [place] [address]. All rights reserved. This information is not intended as a substitute for professional medical care. Always follow your healthcare professional's instructions.        Understanding the Cold Virus  Colds are the most common illness that people get. Most adults get 2 or 3 colds per year, and most children get 5 to 7 colds per year. Colds may be caused by over 200 types of viruses. The most common of these are rhinoviruses (rhino refers to the nose).  What causes a cold virus?  All colds start with infection by a virus. You can be infected by more than one cold virus at a time. Infection with cold viruses happens when:  · You breathe in a virus from the air. This can happen when someone with a cold sneezes or coughs near you.  · You touch your eyes, nose, or mouth when your hand has a cold virus on it. This can happen if you touch an object that has the cold virus on it.  What are the symptoms of a cold virus?  Almost all colds involve a stuffy nose. Other common symptoms include:  · Runny nose  · Sneezing  · Sore throat  · Headache  · Cough  How is a cold treated?  Colds usually last 5 to 10 days. Treatment focuses on relieving symptoms. Treatments may include:  · Decongestant medicines. Several types of  decongestants are available without prescription. These may help reduce stuffy or runny nose symptoms.  · Prescription or over-the-counter nasal sprays. These may help reduce nasal symptoms, including stuffiness.  · Prescription or over-the-counter pain medicines. These can help with headaches and sore throat.  · Self-care. This includes extra rest, using humidifiers, and drinking more fluids. These help you feel better while you are getting over a cold.  Antibiotics are not helpful for a cold. They do not make a cold shorter or relieve symptoms. Taking antibiotics when you dont need them can make them work less well when you need them for another illness.  Follow all directions for using medicines, especially when giving them to children. Contact your healthcare provider if you have any questions about using cold medicines safely.  Can a cold be prevented?  You can help reduce the spread of cold viruses. This can help both you and others avoid getting colds. Follow these tips:  · Wash your hands well anytime you may have come into contact with cold viruses. Wash your hands for at least 20 seconds. When you cant wash with soap and water, use an alcohol-based hand .  · Dont touch your nose, eyes, or mouth, especially after touching something that may have a cold virus on it.  · Cover your mouth and nose when you cough or sneeze. Throw away tissues after using them.  · Disinfect things you touch often, such as phones and keyboards.    · Stay home when you have a cold.  What are the possible complications of a cold virus?  Colds usually go away by themselves. But its not unusual to get another type of infection while you have a cold. These can include:  · Sinus infection  · Lung infection, such as bronchitis or pneumonia  · Ear infection  If you have asthma or chronic bronchitis, a cold can make your condition worse.     When should I call my healthcare provider?  Call your healthcare provider right away  if you have any of these:  · Fever of 100.4°F (38°C) or higher, or as directed  · Cough, chest pain, or shortness of breath that gets worse  · Symptoms dont get better or get worse after about 10 days  · Headache, sleepiness, or confusion that gets worse   Date Last Reviewed: 3/28/2016  © 5573-0763 [employer]. [address] [place] [address]. All rights reserved. This information is not intended as a substitute for professional medical care. Always follow your healthcare professional's instructions.        When to Use Antibiotics   Antibiotics are medicines used to treat infections caused by bacteria. They dont work for illnesses caused by viruses or an allergic reaction. In fact, taking antibiotics for reasons other than a bacterial infection can cause problems. For example, you may have side effects from the medicine. And if you really need an antibiotic, it may not work well.                                                                                                                                              When antibiotics wont help  Your healthcare provider wont usually prescribe antibiotics for the following conditions. You can help by not asking for them if you have:   · A cold. This type of illness is caused by a virus. It can cause a runny nose, stuffed-up nose, sneezing, coughing, headache, mild body aches, and low fever. A cold gets better on its own in a few days to a week.  · The flu (influenza). This is a respiratory illness caused by a virus. The flu usually goes away on its own in a week or so. It can cause fever, body aches, sore throat, and fatigue.  · Bronchitis. This is an infection in the lungs most often caused by a virus. You may have coughing, phlegm, body aches, and a low fever. A common type of bronchitis is known as a chest cold (acute bronchitis). This often happens after you have a respiratory infection like a common cold. Bronchitis can take weeks to  go away, but antibiotics usually dont help.  · Most sore throats. Sore throats are most often caused by viruses. Your throat may feel scratchy or achy, and it may hurt to swallow. You may also have a low fever and body aches. A sore throat usually gets better in a few days.  · Most ear infections. An ear infection may be caused by a virus or bacteria. It causes pain in the ear. Antibiotics usually dont help, and the infection goes away on its own.  · Most sinus infections (sinusitis). This kind of infection causes sinus pain and swelling, and a runny nose. In most cases, sinusitis goes away on its own, and antibiotics dont make recovery quicker.  · Allergic rhinitis. This is a set of symptoms caused by an allergic reaction. You may have sneezing, a runny nose, itchy or watery eyes, or a sore throat. Allergies are not treated with antibiotics.  · Low fever. A mild fever thats less than 100.4°F (38°C) most likely doesnt need treatment with antibiotics.   When antibiotics can help   Antibiotics can be used to treat:                                                     · Strep throat. This is a throat infectioncaused by a certain type of bacteria. Symptoms of strep throat include a sore throat, white patches on the tonsils, red spots on the roof of the mouth, fever, body aches, and nausea and vomiting.  · Urinary tract infection (UTI). This is a bacterial infection of the bladder and the tube that takes urine out of the body. It can cause burning pain and urine thats cloudy or tinted with blood. UTIs are very common. Antibiotics usually help treat these infections.  · Some ear infections. In some cases, a healthcare provider may prescribe antibiotics for an ear infection. You may need a test to show whats causing the ear infection.  · Some sinus infections. In some cases, yourhealthcare provider may give you antibiotics. He or she may first need to make sure your symptoms arent caused by a virus, fungus,  allergies, or air pollutants such as smoke.   Your doctor may also recommend antibiotics if you have a condition that can affect your immune system, such as diabetes or cancer.   Self-care at home   If your infection cant be treated with antibiotics, you can take other steps to feel better. Try the remedies below. In general:   · Rest and sleep as much as needed.  · Drink water and other clear fluids.  · Dont smoke, and avoid smoke from other people.  · Use over-the-counter medicine such as acetaminophen to ease pain or fever, as directed by your healthcare provider.   To treat sinus pain or nasal congestion:   · Put a warm, moist washcloth on your face where you feel sinus pain or pressure.  · Use a nasal spray with medicine or saline, as directed by your healthcare provider.  · Breathe in steam from a hot shower.  · Use a humidifier or cool mist vaporizer.   To quiet a cough:   · Use a humidifier or cool mist vaporizer.  · Breathe in steam from a hot shower.  · Use cough lozenges.   To sooth a sore throat:   · Suck on ice chips, popsicles, or lozenges.  · Use a sore throat spray.  · Use a humidifier or cool mist vaporizer.  · Gargle with saltwater.  · Drink warm liquids.   To ease ear pain:   · Hold a warm, moist washcloth on the ear for 10 minutes at a time.  Date Last Reviewed: 9/1/2016  © 2180-6102 [employer]. [address] [place] [address], [place] [address]. All rights reserved. This information is not intended as a substitute for professional medical care. Always follow your healthcare professional's instructions.

## 2018-02-23 ENCOUNTER — PATIENT MESSAGE (OUTPATIENT)
Dept: INTERNAL MEDICINE | Facility: CLINIC | Age: 63
End: 2018-02-23

## 2018-03-05 PROBLEM — Z00.00 ROUTINE GENERAL MEDICAL EXAMINATION AT A HEALTH CARE FACILITY: Status: RESOLVED | Noted: 2017-11-29 | Resolved: 2018-03-05

## 2018-03-20 ENCOUNTER — PATIENT OUTREACH (OUTPATIENT)
Dept: ADMINISTRATIVE | Facility: HOSPITAL | Age: 63
End: 2018-03-20

## 2018-04-23 ENCOUNTER — OFFICE VISIT (OUTPATIENT)
Dept: INTERNAL MEDICINE | Facility: CLINIC | Age: 63
End: 2018-04-23
Payer: COMMERCIAL

## 2018-04-23 VITALS
TEMPERATURE: 97 F | DIASTOLIC BLOOD PRESSURE: 72 MMHG | HEIGHT: 65 IN | RESPIRATION RATE: 16 BRPM | BODY MASS INDEX: 43.79 KG/M2 | SYSTOLIC BLOOD PRESSURE: 110 MMHG | WEIGHT: 262.81 LBS | HEART RATE: 70 BPM | OXYGEN SATURATION: 97 %

## 2018-04-23 DIAGNOSIS — H00.011 HORDEOLUM EXTERNUM OF RIGHT UPPER EYELID: Primary | ICD-10-CM

## 2018-04-23 PROCEDURE — 99999 PR PBB SHADOW E&M-EST. PATIENT-LVL III: CPT | Mod: PBBFAC,,, | Performed by: FAMILY MEDICINE

## 2018-04-23 PROCEDURE — 3074F SYST BP LT 130 MM HG: CPT | Mod: CPTII,S$GLB,, | Performed by: FAMILY MEDICINE

## 2018-04-23 PROCEDURE — 99213 OFFICE O/P EST LOW 20 MIN: CPT | Mod: S$GLB,,, | Performed by: FAMILY MEDICINE

## 2018-04-23 PROCEDURE — 3078F DIAST BP <80 MM HG: CPT | Mod: CPTII,S$GLB,, | Performed by: FAMILY MEDICINE

## 2018-04-23 RX ORDER — SULFACETAMIDE SODIUM 100 MG/ML
1 SOLUTION/ DROPS OPHTHALMIC
COMMUNITY
End: 2018-04-23 | Stop reason: ALTCHOICE

## 2018-04-23 RX ORDER — ERYTHROMYCIN 5 MG/G
OINTMENT OPHTHALMIC 3 TIMES DAILY
Qty: 3.5 G | Refills: 0 | Status: SHIPPED | OUTPATIENT
Start: 2018-04-23 | End: 2018-06-13 | Stop reason: ALTCHOICE

## 2018-04-23 NOTE — PROGRESS NOTES
Subjective:       Patient ID: Concepcion Don is a 62 y.o. female.    Chief Complaint: Eye Pain (right ) and Facial Pain (right )    Right eye lid pain:  O: in past 5 days  L: Right upper eyelid  D: worsening  Mitigated with sodium sulfacetamide droips      Review of Systems   Constitutional: Negative for activity change and unexpected weight change.   HENT: Negative for hearing loss, rhinorrhea and trouble swallowing.    Eyes: Negative for discharge and visual disturbance.   Respiratory: Negative for chest tightness and wheezing.    Cardiovascular: Negative for chest pain and palpitations.   Gastrointestinal: Negative for blood in stool, constipation, diarrhea and vomiting.   Endocrine: Negative for polydipsia and polyuria.   Genitourinary: Negative for difficulty urinating, dysuria, hematuria and menstrual problem.   Musculoskeletal: Negative for arthralgias, joint swelling and neck pain.   Neurological: Negative for weakness and headaches.   Psychiatric/Behavioral: Negative for confusion and dysphoric mood.       Objective:      Physical Exam   Constitutional: She appears well-developed.   HENT:   Mouth/Throat: Oropharynx is clear and moist.   Eyes: Conjunctivae are normal. Right eye exhibits no discharge. Left eye exhibits no discharge. No scleral icterus.   Right eye central upper lid has round lesion, mild erythema   Skin: Skin is warm and dry. She is not diaphoretic.   Nursing note and vitals reviewed.      Assessment:       1. Hordeolum externum of right upper eyelid        Plan:     Problem List Items Addressed This Visit        Ophtho    Hordeolum externum of right upper eyelid - Primary    Current Assessment & Plan     Patient education on stye/hordeolum.  Topical erythromycin ointment 4 times a day.  Warm compresses with antibiotic application  If any worsening, increasd pain, changee in vision or no improvement despite above call for ophthalmology referral.  If frequent recurrences, call for ophthalmology  referral.           Relevant Medications    erythromycin (ROMYCIN) ophthalmic ointment

## 2018-04-23 NOTE — ASSESSMENT & PLAN NOTE
Patient education on stye/hordeolum.  Topical erythromycin ointment 4 times a day.  Warm compresses with antibiotic application  If any worsening, increasd pain, changee in vision or no improvement despite above call for ophthalmology referral.  If frequent recurrences, call for ophthalmology referral.

## 2018-04-23 NOTE — PATIENT INSTRUCTIONS
Sty (or Stye)  A sty is an infection of the oil gland of the eyelid. It may develop into a small pocket of pus (abscess). This can cause pain, redness, and swelling. In early stages, styes are treated with antibiotic cream, eye drops, or warm packs (small towels soaked in warm water). More severe cases may need to be opened and drained by a health care provider.  Home care  · Eye drops or ointment are usually prescribed to treat the infection. Use these as directed.   ¨ Artificial tears may also be used to lubricate the eye and make it more comfortable. These may be purchased without a prescription.   ¨ Talk to your health care provider before using any over-the-counter treatment for a sty.  · Apply a warm, damp towel to the affected eye for at least 5 minutes, 3 to 4 times a day for a week. Warm compresses open the pores and speed the healing. If the compresses are too hot, they may burn your eyelid.  · Sometimes the sty will drain with this treatment alone. If this happens, continue the antibiotic until all the redness and swelling are gone.  · Wash your hands before and after touching the infected eye to avoid spreading the infection.  · Do not squeeze or try to puncture the sty.  Follow-up care  Follow up with your health care provider, or as advised.   When to seek medical advice  Call your health care provider right away if you have:  · Increase in swelling or redness around the eyelid after 48 to 72 hours  · Increase in eye pain or the eyelid blisters  · Increase in warmth--the eyelid feels hot  · Drainage of blood or thick pus from the sty  · Blister on the eyelid  · Inability to open the eyelid due to swelling  · Fever  ¨ 1 degree above your normal temperature lasting for 24 to 48 hours, or  ¨ Whatever your health care provider told you to report based on your medical condition  · Vision changes  · Headache or stiff neck  · Recurrence of the sty  Date Last Reviewed: 6/14/2015  © 5836-7431 [employer]. [address] [place] [address], [place] [address]. All rights reserved. This information is not intended as a substitute for professional medical care. Always follow your healthcare professional's instructions.

## 2018-04-27 RX ORDER — LEVOTHYROXINE SODIUM 25 UG/1
25 TABLET ORAL DAILY
Qty: 90 TABLET | Refills: 3 | Status: SHIPPED | OUTPATIENT
Start: 2018-04-27 | End: 2019-04-09 | Stop reason: SDUPTHER

## 2018-05-03 ENCOUNTER — PATIENT MESSAGE (OUTPATIENT)
Dept: INTERNAL MEDICINE | Facility: CLINIC | Age: 63
End: 2018-05-03

## 2018-05-15 DIAGNOSIS — M25.561 ACUTE PAIN OF BOTH KNEES: Primary | ICD-10-CM

## 2018-05-15 DIAGNOSIS — M25.562 ACUTE PAIN OF BOTH KNEES: Primary | ICD-10-CM

## 2018-05-25 ENCOUNTER — HOSPITAL ENCOUNTER (OUTPATIENT)
Dept: RADIOLOGY | Facility: HOSPITAL | Age: 63
Discharge: HOME | End: 2018-05-25
Attending: ORTHOPAEDIC SURGERY
Payer: COMMERCIAL

## 2018-05-25 ENCOUNTER — OFFICE VISIT (OUTPATIENT)
Dept: ORTHOPEDICS | Facility: CLINIC | Age: 63
End: 2018-05-25
Payer: COMMERCIAL

## 2018-05-25 VITALS
BODY MASS INDEX: 43.79 KG/M2 | HEIGHT: 65 IN | SYSTOLIC BLOOD PRESSURE: 134 MMHG | HEART RATE: 76 BPM | WEIGHT: 262.81 LBS | DIASTOLIC BLOOD PRESSURE: 77 MMHG

## 2018-05-25 DIAGNOSIS — M25.561 ACUTE PAIN OF BOTH KNEES: ICD-10-CM

## 2018-05-25 DIAGNOSIS — R63.5 WEIGHT GAIN: Primary | ICD-10-CM

## 2018-05-25 DIAGNOSIS — M25.562 ACUTE PAIN OF BOTH KNEES: ICD-10-CM

## 2018-05-25 DIAGNOSIS — M25.562 LEFT KNEE PAIN, UNSPECIFIED CHRONICITY: Primary | ICD-10-CM

## 2018-05-25 DIAGNOSIS — M17.12 PRIMARY OSTEOARTHRITIS OF LEFT KNEE: ICD-10-CM

## 2018-05-25 PROCEDURE — 73564 X-RAY EXAM KNEE 4 OR MORE: CPT | Mod: TC,50

## 2018-05-25 PROCEDURE — 73564 X-RAY EXAM KNEE 4 OR MORE: CPT | Mod: 26,50,, | Performed by: RADIOLOGY

## 2018-05-25 PROCEDURE — 3075F SYST BP GE 130 - 139MM HG: CPT | Mod: CPTII,S$GLB,, | Performed by: ORTHOPAEDIC SURGERY

## 2018-05-25 PROCEDURE — 99203 OFFICE O/P NEW LOW 30 MIN: CPT | Mod: S$GLB,,, | Performed by: ORTHOPAEDIC SURGERY

## 2018-05-25 PROCEDURE — 3008F BODY MASS INDEX DOCD: CPT | Mod: CPTII,S$GLB,, | Performed by: ORTHOPAEDIC SURGERY

## 2018-05-25 PROCEDURE — 3078F DIAST BP <80 MM HG: CPT | Mod: CPTII,S$GLB,, | Performed by: ORTHOPAEDIC SURGERY

## 2018-05-25 PROCEDURE — 99999 PR PBB SHADOW E&M-EST. PATIENT-LVL III: CPT | Mod: PBBFAC,,, | Performed by: ORTHOPAEDIC SURGERY

## 2018-05-25 NOTE — LETTER
May 25, 2018      Christiano Mays MD  [id number] [address] [address]           [facility]  [address] [place] [id number]  Phone: [phone]  Fax: [phone]          Patient: Concepcion Don   MR Number: [id number]   YOB: 1955   Date of Visit: 5/25/2018       Dear Dr. Christiano Mays:    Thank you for referring Concepcion Don to me for evaluation. Attached you will find relevant portions of my assessment and plan of care.    If you have questions, please do not hesitate to call me. I look forward to following Concepcion Don along with you.    Sincerely,    Bartolome Grey MD    Enclosure  CC:  No Recipients    If you would like to receive this communication electronically, please contact [email or web address] or [phone] to request more information on [facility] Link access.    For providers and/or their staff who would like to refer a patient to [facility], please contact us through our one-stop-shop provider referral line, Dave Espinosa, at [phone].    If you feel you have received this communication in error or would no longer like to receive these types of communications, please e-mail [email or web address]

## 2018-05-25 NOTE — PROGRESS NOTES
"Subjective:     Patient ID: Concepcion Don is a 62 y.o. female.    Chief Complaint: Pain of the Left Knee    She is here for her left knee, 10-20 years of pain here.  Pain is anterior and diffuse.  She had a right total knee arthroplasty by Dr. Richardson into .  He notes a little stiffness here but "no pain."     She states she can walk unlimited with the left knee but with pain.  She does not use a cane or crutch.  She avoids stairs.      Knee Pain    The pain is present in the left knee. This is a chronic problem. The current episode started more than 1 year ago. The problem occurs intermittently. The quality of the pain is described as aching. The pain is at a severity of 0/10. Associated symptoms include joint swelling. Pertinent negatives include no fever or itching. The symptoms are aggravated by walking and lying down. She has tried NSAIDs for the symptoms. The treatment provided moderate relief. Physical therapy was effective.      Past Medical History:   Diagnosis Date    Hypertension     Thyroid disease      Past Surgical History:   Procedure Laterality Date     SECTION, CLASSIC      HYSTERECTOMY      KNEE SURGERY      repleacement right knee    ROTATOR CUFF REPAIR Right      Family History   Problem Relation Age of Onset    Kidney disease Mother     Hypertension Mother     Diabetes Mother     Hypertension Father     Stroke Father      Social History     Social History    Marital status: Single     Spouse name: N/A    Number of children: N/A    Years of education: N/A     Occupational History    Not on file.     Social History Main Topics    Smoking status: Never Smoker    Smokeless tobacco: Never Used    Alcohol use Yes      Comment: occasional    Drug use: No    Sexual activity: No     Other Topics Concern    Not on file     Social History Narrative    No narrative on file     Medication List with Changes/Refills   Current Medications    ACETAMINOPHEN (TYLENOL) " 500 MG TABLET    Take 1 tablet (500 mg total) by mouth every 6 (six) hours as needed for Pain.    ASPIRIN 325 MG TABLET    Take 325 mg by mouth once daily.    BENAZEPRIL-HYDROCHLORTHIAZIDE (LOTENSIN HCT) 20-12.5 MG PER TABLET    Take 1 tablet by mouth once daily.    CALCIUM CARBONATE (OS-JENNIFER) 500 MG CALCIUM (1,250 MG) TABLET    Take 2 tablets (1,000 mg total) by mouth once daily.    DOCUSATE CALCIUM (STOOL SOFTENER ORAL)    Take by mouth.    ERYTHROMYCIN (ROMYCIN) OPHTHALMIC OINTMENT    Place into the right eye 3 (three) times daily.    GLUCOSAMINE SULFATE (GLUCOSAMINE ORAL)    Take by mouth once daily.    LEVOTHYROXINE (SYNTHROID) 25 MCG TABLET    Take 1 tablet (25 mcg total) by mouth once daily.    MULTIVITAMIN CAPSULE    Take 1 capsule by mouth once daily.    PSYLLIUM SEED, WITH DEXTROSE, (FIBER ORAL)    Take by mouth once daily.     Review of patient's allergies indicates:  No Known Allergies  Review of Systems   Constitution: Negative for fever.   HENT: Negative for sore throat.    Eyes: Negative for blurred vision.   Cardiovascular: Negative for dyspnea on exertion.   Respiratory: Negative for shortness of breath.    Hematologic/Lymphatic: Does not bruise/bleed easily.   Skin: Negative for itching.   Musculoskeletal: Positive for joint pain.   Gastrointestinal: Negative for vomiting.   Genitourinary: Negative for dysuria.   Neurological: Negative for dizziness.   Psychiatric/Behavioral: The patient does not have insomnia.        Objective:   Body mass index is 43.73 kg/m².  Vitals:    05/25/18 0929   BP: 134/77   Pulse: 76           General    Nursing note and vitals reviewed.  Constitutional: She is oriented to person, place, and time. She appears well-developed. No distress.   HENT:   Head: Normocephalic and atraumatic.   Eyes: EOM are normal.   Cardiovascular: Normal rate.    Pulmonary/Chest: Effort normal. No stridor.   Neurological: She is alert and oriented to person, place, and time.   Psychiatric: She  has a normal mood and affect. Her behavior is normal.     General Musculoskeletal Exam   Gait: antalgic       Right Knee Exam     Inspection   Scars: present  Effusion: effusion    Range of Motion   Extension: 0   Flexion: 90     Left Knee Exam     Inspection   Scars: absent  Effusion: present    Tenderness   The patient tender to palpation of the medial joint line.    Crepitus   The patient has crepitus of the patella and MFC.    Range of Motion   Extension: 20   Flexion: 80     Tests   Meniscus   Mary:  Medial - negative Lateral - negative  Stability Lachman: normal (-1 to 2mm) PCL-Posterior Drawer: normal (0 to 2mm)  MCL - Valgus: normal (0 to 2mm)  LCL - Varus: normal (0 to 2mm)  Patella   Patellar Tracking: normal  J-Sign: J sign absent    Other   Sensation: normal    Comments:  WWP foot    Muscle Strength   Right Lower Extremity   Right quadriceps strength: Moderately decreased quad tone.   Left Lower Extremity   Left quadriceps strength: Severely decreased quad tone.       IMAGING AP and PA flexion standing views of both knees as well as lateral and Merchant views of the left and a Merchant view of the right knee were obtained today, ordered and interpreted by me and these demonstrate:     No fracture or dislocation  Right knee: prior TKA components look to be appropriate alignment, no obvious loosening.    Left knee Medial compartment: severe degenerative changes  Left knee Lateral compartment: mild degenerative changes  Left knee Patellofemoral compartment: severe degenerative changes      Assessment:     Encounter Diagnoses   Name Primary?    Left knee pain, unspecified chronicity Yes    Primary osteoarthritis of left knee         Plan:     -  quad strengthening/Home exercise program    - OTC Aleve with food if okay with Dr. Mays    - Euflexxa authorization    We had an in depth discussion regarding weight loss options including disccusion about possible referral to the bariatric surgery / weight  management and nutrition program when appropriate.    Follow up in 1-2 weeks for Euflexxa injections L knee

## 2018-06-13 ENCOUNTER — OFFICE VISIT (OUTPATIENT)
Dept: ORTHOPEDICS | Facility: CLINIC | Age: 63
End: 2018-06-13
Payer: COMMERCIAL

## 2018-06-13 VITALS
HEART RATE: 69 BPM | SYSTOLIC BLOOD PRESSURE: 151 MMHG | DIASTOLIC BLOOD PRESSURE: 79 MMHG | WEIGHT: 262.81 LBS | HEIGHT: 65 IN | BODY MASS INDEX: 43.79 KG/M2

## 2018-06-13 DIAGNOSIS — M17.12 PRIMARY OSTEOARTHRITIS OF LEFT KNEE: Primary | ICD-10-CM

## 2018-06-13 PROCEDURE — 20610 DRAIN/INJ JOINT/BURSA W/O US: CPT | Mod: LT,S$GLB,, | Performed by: ORTHOPAEDIC SURGERY

## 2018-06-13 PROCEDURE — 99499 UNLISTED E&M SERVICE: CPT | Mod: S$GLB,,, | Performed by: ORTHOPAEDIC SURGERY

## 2018-06-13 PROCEDURE — 99999 PR PBB SHADOW E&M-EST. PATIENT-LVL III: CPT | Mod: PBBFAC,,, | Performed by: ORTHOPAEDIC SURGERY

## 2018-06-13 RX ORDER — AMOXICILLIN 500 MG
CAPSULE ORAL DAILY
COMMUNITY
End: 2018-08-21

## 2018-06-13 NOTE — PROCEDURES
Large Joint Aspiration/Injection  Date/Time: 6/13/2018 6:28 PM  Performed by: OLGA GAMBOA  Authorized by: OLGA GAMBOA     Consent Done?:  Yes (Verbal)  Indications:  Pain and diagnostic evaluation  Procedure site marked: Yes    Timeout: Prior to procedure the correct patient, procedure, and site was verified      Location:  Knee  Site:  L knee  Prep: Patient was prepped and draped in usual sterile fashion    Ultrasonic Guidance for needle placement: No  Needle size:  22 G  Approach:  Anterolateral  Medications:  20 mg sodium hyaluronate (EUFLEXXA) 10 mg/mL(mw 2.4 -3.6 million)  Patient tolerance:  Patient tolerated the procedure well with no immediate complications    Additional Comments: The patient had no adverse reactions to the medication. The patient was instructed to apply ice to the joint for 30 minutes on and 30 minutes off for the next 48 hours, and avoid strenuous activities for 48 hours following the injection. Following that time, patient can resume regular activities.

## 2018-06-20 ENCOUNTER — OFFICE VISIT (OUTPATIENT)
Dept: ORTHOPEDICS | Facility: CLINIC | Age: 63
End: 2018-06-20
Payer: COMMERCIAL

## 2018-06-20 VITALS — HEIGHT: 65 IN | BODY MASS INDEX: 43.79 KG/M2 | WEIGHT: 262.81 LBS

## 2018-06-20 DIAGNOSIS — M17.12 PRIMARY OSTEOARTHRITIS OF LEFT KNEE: Primary | ICD-10-CM

## 2018-06-20 PROCEDURE — 99499 UNLISTED E&M SERVICE: CPT | Mod: S$GLB,,, | Performed by: ORTHOPAEDIC SURGERY

## 2018-06-20 PROCEDURE — 20610 DRAIN/INJ JOINT/BURSA W/O US: CPT | Mod: LT,S$GLB,, | Performed by: ORTHOPAEDIC SURGERY

## 2018-06-20 PROCEDURE — 99999 PR PBB SHADOW E&M-EST. PATIENT-LVL II: CPT | Mod: PBBFAC,,, | Performed by: ORTHOPAEDIC SURGERY

## 2018-06-20 NOTE — PROCEDURES
Large Joint Aspiration/Injection  Date/Time: 6/20/2018 1:26 PM  Performed by: OLGA GAMBOA  Authorized by: OLGA GAMBOA     Consent Done?:  Yes (Verbal)  Indications:  Pain and diagnostic evaluation  Procedure site marked: Yes    Timeout: Prior to procedure the correct patient, procedure, and site was verified      Location:  Knee  Site:  L knee  Prep: Patient was prepped and draped in usual sterile fashion    Ultrasonic Guidance for needle placement: No  Needle size:  22 G  Approach:  Anterolateral  Medications:  20 mg sodium hyaluronate (EUFLEXXA) 10 mg/mL(mw 2.4 -3.6 million)  Patient tolerance:  Patient tolerated the procedure well with no immediate complications    Additional Comments: The patient had no adverse reactions to the medication. The patient was instructed to apply ice to the joint for 30 minutes on and 30 minutes off for the next 48 hours, and avoid strenuous activities for 48 hours following the injection. Following that time, patient can resume regular activities.

## 2018-06-27 ENCOUNTER — OFFICE VISIT (OUTPATIENT)
Dept: ORTHOPEDICS | Facility: CLINIC | Age: 63
End: 2018-06-27
Payer: COMMERCIAL

## 2018-06-27 VITALS
WEIGHT: 262 LBS | SYSTOLIC BLOOD PRESSURE: 138 MMHG | DIASTOLIC BLOOD PRESSURE: 91 MMHG | BODY MASS INDEX: 43.65 KG/M2 | HEIGHT: 65 IN | HEART RATE: 70 BPM

## 2018-06-27 DIAGNOSIS — M17.12 PRIMARY OSTEOARTHRITIS OF LEFT KNEE: Primary | ICD-10-CM

## 2018-06-27 DIAGNOSIS — M25.562 LEFT KNEE PAIN, UNSPECIFIED CHRONICITY: Primary | ICD-10-CM

## 2018-06-27 PROCEDURE — 99999 PR PBB SHADOW E&M-EST. PATIENT-LVL III: CPT | Mod: PBBFAC,,, | Performed by: ORTHOPAEDIC SURGERY

## 2018-06-27 PROCEDURE — 20610 DRAIN/INJ JOINT/BURSA W/O US: CPT | Mod: LT,S$GLB,, | Performed by: ORTHOPAEDIC SURGERY

## 2018-06-27 PROCEDURE — 99499 UNLISTED E&M SERVICE: CPT | Mod: S$GLB,,, | Performed by: ORTHOPAEDIC SURGERY

## 2018-06-27 NOTE — PROCEDURES
Large Joint Aspiration/Injection  Date/Time: 6/27/2018 11:32 AM  Performed by: OLGA GAMBOA  Authorized by: OLGA GAMBOA     Consent Done?:  Yes (Verbal)  Indications:  Pain and diagnostic evaluation  Procedure site marked: Yes    Timeout: Prior to procedure the correct patient, procedure, and site was verified      Location:  Knee  Site:  L knee  Prep: Patient was prepped and draped in usual sterile fashion    Needle size:  20 G  Medications:  20 mg sodium hyaluronate (EUFLEXXA) 10 mg/mL(mw 2.4 -3.6 million)  Patient tolerance:  Patient tolerated the procedure well with no immediate complications    Additional Comments: The patient had no adverse reactions to the medication. The patient was instructed to apply ice to the joint for 30 minutes on and 30 minutes off for the next 48 hours, and avoid strenuous activities for 48 hours following the injection. Following that time, patient can resume regular activities.

## 2018-08-21 ENCOUNTER — OFFICE VISIT (OUTPATIENT)
Dept: INTERNAL MEDICINE | Facility: CLINIC | Age: 63
End: 2018-08-21
Payer: COMMERCIAL

## 2018-08-21 VITALS
TEMPERATURE: 99 F | RESPIRATION RATE: 18 BRPM | WEIGHT: 262.56 LBS | HEIGHT: 65 IN | DIASTOLIC BLOOD PRESSURE: 68 MMHG | HEART RATE: 91 BPM | OXYGEN SATURATION: 97 % | SYSTOLIC BLOOD PRESSURE: 114 MMHG | BODY MASS INDEX: 43.75 KG/M2

## 2018-08-21 DIAGNOSIS — J34.89 NASAL SORE: Primary | ICD-10-CM

## 2018-08-21 PROCEDURE — 3078F DIAST BP <80 MM HG: CPT | Mod: CPTII,S$GLB,, | Performed by: NURSE PRACTITIONER

## 2018-08-21 PROCEDURE — 99214 OFFICE O/P EST MOD 30 MIN: CPT | Mod: S$GLB,,, | Performed by: NURSE PRACTITIONER

## 2018-08-21 PROCEDURE — 3074F SYST BP LT 130 MM HG: CPT | Mod: CPTII,S$GLB,, | Performed by: NURSE PRACTITIONER

## 2018-08-21 PROCEDURE — 99999 PR PBB SHADOW E&M-EST. PATIENT-LVL V: CPT | Mod: PBBFAC,,, | Performed by: NURSE PRACTITIONER

## 2018-08-21 PROCEDURE — 3008F BODY MASS INDEX DOCD: CPT | Mod: CPTII,S$GLB,, | Performed by: NURSE PRACTITIONER

## 2018-08-21 RX ORDER — MULTIVIT WITH MINERALS/HERBS
1 TABLET ORAL DAILY
COMMUNITY
End: 2020-10-02

## 2018-08-21 RX ORDER — MUPIROCIN 20 MG/G
OINTMENT TOPICAL 2 TIMES DAILY
Qty: 30 G | Refills: 0 | Status: SHIPPED | OUTPATIENT
Start: 2018-08-21 | End: 2018-08-28

## 2018-08-21 RX ORDER — CEPHRADINE 250 MG
1 CAPSULE ORAL DAILY
COMMUNITY
End: 2019-07-02

## 2018-08-24 ENCOUNTER — PATIENT MESSAGE (OUTPATIENT)
Dept: INTERNAL MEDICINE | Facility: CLINIC | Age: 63
End: 2018-08-24

## 2018-08-24 DIAGNOSIS — J01.00 ACUTE NON-RECURRENT MAXILLARY SINUSITIS: Primary | ICD-10-CM

## 2018-08-24 RX ORDER — AMOXICILLIN AND CLAVULANATE POTASSIUM 875; 125 MG/1; MG/1
1 TABLET, FILM COATED ORAL EVERY 12 HOURS
Qty: 20 TABLET | Refills: 0 | Status: SHIPPED | OUTPATIENT
Start: 2018-08-24 | End: 2018-09-21

## 2018-08-29 PROBLEM — J34.89 NASAL SORE: Status: ACTIVE | Noted: 2018-08-29

## 2018-08-29 NOTE — PROGRESS NOTES
Subjective:       Patient ID: Concepcion Don is a 63 y.o. female.    Chief Complaint: Facial Pain (left side, nasal x 5 days)    Facial Pain   This is a new problem. The current episode started in the past 7 days. The problem occurs constantly. The problem has been unchanged. Associated symptoms include congestion. Pertinent negatives include no arthralgias, chest pain, chills, coughing, diaphoresis, fatigue, fever, headaches, joint swelling, myalgias, nausea, neck pain, numbness, rash, vomiting or weakness. Associated symptoms comments: Sore in nose. Nothing aggravates the symptoms. Treatments tried: neosporin in nose. The treatment provided mild relief.     Review of Systems   Constitutional: Negative for activity change, chills, diaphoresis, fatigue, fever and unexpected weight change.   HENT: Positive for congestion. Negative for hearing loss, nosebleeds, rhinorrhea and trouble swallowing.    Eyes: Negative for discharge and visual disturbance.   Respiratory: Negative for cough, chest tightness and wheezing.    Cardiovascular: Negative for chest pain and palpitations.   Gastrointestinal: Negative for blood in stool, constipation, diarrhea, nausea and vomiting.   Endocrine: Negative for polydipsia and polyuria.   Genitourinary: Negative for difficulty urinating, dysuria, hematuria and menstrual problem.   Musculoskeletal: Negative for arthralgias, joint swelling, myalgias and neck pain.   Skin: Negative for rash.   Neurological: Negative for weakness, numbness and headaches.   Psychiatric/Behavioral: Negative for confusion and dysphoric mood.       Objective:      Physical Exam   Constitutional: She is oriented to person, place, and time. She appears well-developed. No distress.   HENT:   Right Ear: Hearing, tympanic membrane, external ear and ear canal normal.   Left Ear: Hearing, tympanic membrane, external ear and ear canal normal.   Sore inside left nostril   Eyes: Pupils are equal, round, and reactive to  light.   Cardiovascular: Normal rate and regular rhythm.   Pulmonary/Chest: Effort normal and breath sounds normal. No respiratory distress. She has no wheezes.   Neurological: She is alert and oriented to person, place, and time.   Skin: Skin is warm and dry. No rash noted. She is not diaphoretic.   Psychiatric: She has a normal mood and affect. Her behavior is normal.   Nursing note and vitals reviewed.      Assessment:       1. Nasal sore        Plan:     Problem List Items Addressed This Visit        ENT    Nasal sore - Primary    Current Assessment & Plan     Bactroban BID               Follow-up if symptoms worsen or fail to improve.

## 2018-09-21 ENCOUNTER — OFFICE VISIT (OUTPATIENT)
Dept: INTERNAL MEDICINE | Facility: CLINIC | Age: 63
End: 2018-09-21
Payer: COMMERCIAL

## 2018-09-21 VITALS
HEART RATE: 72 BPM | RESPIRATION RATE: 16 BRPM | SYSTOLIC BLOOD PRESSURE: 114 MMHG | BODY MASS INDEX: 41.95 KG/M2 | TEMPERATURE: 99 F | HEIGHT: 66 IN | DIASTOLIC BLOOD PRESSURE: 76 MMHG | OXYGEN SATURATION: 98 % | WEIGHT: 261 LBS

## 2018-09-21 DIAGNOSIS — L03.818 CELLULITIS OF OTHER SPECIFIED SITE: Primary | ICD-10-CM

## 2018-09-21 PROBLEM — L03.90 CELLULITIS: Status: ACTIVE | Noted: 2018-09-21

## 2018-09-21 PROCEDURE — 3074F SYST BP LT 130 MM HG: CPT | Mod: CPTII,S$GLB,, | Performed by: NURSE PRACTITIONER

## 2018-09-21 PROCEDURE — 99214 OFFICE O/P EST MOD 30 MIN: CPT | Mod: S$GLB,,, | Performed by: NURSE PRACTITIONER

## 2018-09-21 PROCEDURE — 3078F DIAST BP <80 MM HG: CPT | Mod: CPTII,S$GLB,, | Performed by: NURSE PRACTITIONER

## 2018-09-21 PROCEDURE — 99999 PR PBB SHADOW E&M-EST. PATIENT-LVL IV: CPT | Mod: PBBFAC,,, | Performed by: NURSE PRACTITIONER

## 2018-09-21 PROCEDURE — 3008F BODY MASS INDEX DOCD: CPT | Mod: CPTII,S$GLB,, | Performed by: NURSE PRACTITIONER

## 2018-09-21 RX ORDER — DOXYCYCLINE 100 MG/1
100 CAPSULE ORAL 2 TIMES DAILY
Qty: 10 CAPSULE | Refills: 0 | Status: SHIPPED | OUTPATIENT
Start: 2018-09-21 | End: 2018-09-26

## 2018-09-21 NOTE — PATIENT INSTRUCTIONS
Cellulitis (Child)  Cellulitis is an infection of the deep layers of skin. A break in the skin, such as a cut or scratch, can let bacteria under the skin. If the bacteria get to deep layers of the skin, it can case a serious infection. If not treated, cellulitis can get into the bloodstream and lymph nodes. The infection can then spread throughout the body.  In children, cellulitis occurs most often on the legs and feet. It is more common in children with a weakened immune system. Cellulitis causes the affected skin to become red, swollen, warm, and sore. The reddened areas have a visible border. Your child may have a fever, chills, and pain. A young child may be fussy and cry and be hard to soothe.  Cellulitis is treated with antibiotics. Symptoms should get better 1 to 2 days after treatment is started. In some cases, symptoms can come back.  Home care  You will be given an antibiotic to treat the infection. Make sure to give all the medicine for the full number of days until it is gone. Keep giving the medicine even if your child has no symptoms. You may also be advised to use medicine to reduce fever and swelling. Follow the healthcare providers instructions for giving these medicines to your child.  General care  · Have your child rest as much as possible until the infection starts to get better.  · If possible, have your child sit or lie down with the affected area raised above the level of his or her heart. This can help reduce swelling.  · Follow the healthcare providers instructions to care for an open wound and change any dressings.  · Keep your childs fingernails short to reduce scratching.  · Wash your hands with soap and warm water before and after caring for your child. This is to prevent spreading the infection.  Follow-up care  Follow up with your childs healthcare provider.  When to seek medical advice  Call your child's healthcare provider right away if any of these occur:  · Fever of 100.4º  F (38.0º C) or higher orally, or over 101.4º F (38.6 C) rectally, after 2 days on antibiotics  · Symptoms that dont get better with treatment  · Swollen lymph nodes on the neck or under the arm  · Swelling around the eyes or behind the ears  · Excessive drooling, neck swelling, or muffled voice  · Redness or swelling that gets worse  · Pain that gets worse  · Foul-smelling fluid coming from the affected area  · Blackened skin  Date Last Reviewed: 1/1/2017 © 2000-2017 [employer]. [address] [place] [address]. All rights reserved. This information is not intended as a substitute for professional medical care. Always follow your healthcare professional's instructions.        Discharge Instructions for Cellulitis  You have been diagnosed with cellulitis. This is an infection in the deepest layer of the skin. In some cases, the infection also affects the muscle. Cellulitis is caused by bacteria. The bacteria can enter the body through broken skin. This can happen with a cut, scratch, animal bite, or an insect bite that has been scratched. You may have been treated in the hospital with antibiotics and fluids. You will likely be given a prescription for antibiotics to take at home. This sheet will help you take care of yourself at home.  Home care  When you are home:  · Take the prescribed antibiotic medicine you are given as directed until it is gone. Take it even if you feel better. It treats the infection and stops it from returning. Not taking all the medicine can make future infections hard to treat.  · Keep the infected area clean.  · When possible, raise the infected area above the level of your heart. This helps keep swelling down.  · Talk with your healthcare provider if you are in pain. Ask what kind of over-the-counter medicine you can take for pain.  · Apply clean bandages as advised.  · Take your temperature once a day for a week.  · Wash your hands often to prevent spreading the  infection.  In the future, wash your hands before and after you touch cuts, scratches, or bandages. This will help prevent infection.   When to call your healthcare provider  Call your healthcare provider immediately if you have any of the following:  · Difficulty or pain when moving the joints above or below the infected area  · Discharge or pus draining from the area  · Fever of 100.4°F (38°C) or higher, or as directed by your healthcare provider  · Pain that gets worse in or around the infected   · Redness that gets worse in or around the infected area, particularly if the area of redness expands to a wider area  · Shaking chills  · Swelling of the infected area  · Vomiting   Date Last Reviewed: 8/1/2016  © 7807-3764 [employer]. [address] [place] [address], [place] [address]. All rights reserved. This information is not intended as a substitute for professional medical care. Always follow your healthcare professional's instructions.

## 2018-10-01 NOTE — ASSESSMENT & PLAN NOTE
tx with doxycycline orally. Left lower leg. Likely r/t scratching insect bite  Marked area with marker pt will monitor for advancing redness  RTC if failing to improve

## 2018-10-01 NOTE — PROGRESS NOTES
Subjective:       Patient ID: Concepcion Don is a 63 y.o. female.    Chief Complaint: Insect Bite and Leg Swelling    Insect Bite   This is a new problem. The current episode started in the past 7 days. The problem occurs constantly. The problem has been rapidly worsening. Associated symptoms include a rash. Pertinent negatives include no abdominal pain, anorexia, arthralgias, change in bowel habit, chest pain, chills, congestion, coughing, diaphoresis, fatigue, fever, headaches, joint swelling, myalgias, nausea, neck pain, numbness, sore throat, swollen glands, urinary symptoms, vertigo, visual change, vomiting or weakness. Nothing aggravates the symptoms. Treatments tried: benadryl cream. The treatment provided mild relief.   Rash   This is a new problem. The current episode started in the past 7 days. The problem has been gradually worsening since onset. The affected locations include the left lower leg. The rash is characterized by swelling, redness, pain and itchiness. She was exposed to an insect bite/sting. Pertinent negatives include no anorexia, congestion, cough, fatigue, fever, sore throat or vomiting.     Review of Systems   Constitutional: Negative for chills, diaphoresis, fatigue and fever.   HENT: Negative for congestion and sore throat.    Respiratory: Negative for cough, chest tightness and wheezing.    Cardiovascular: Negative for chest pain, palpitations and leg swelling.   Gastrointestinal: Negative for abdominal pain, anorexia, change in bowel habit, nausea and vomiting.   Musculoskeletal: Negative for arthralgias, joint swelling, myalgias and neck pain.   Skin: Positive for color change and rash.   Neurological: Negative for vertigo, weakness, numbness and headaches.   Hematological: Negative for adenopathy.       Objective:      Physical Exam   Constitutional: She is oriented to person, place, and time. She appears well-developed. No distress.   Cardiovascular: Normal rate and regular rhythm.    Pulmonary/Chest: Effort normal and breath sounds normal. No respiratory distress. She has no wheezes.   Neurological: She is alert and oriented to person, place, and time.   Skin: Skin is warm and dry. Rash noted. She is not diaphoretic.        Psychiatric: She has a normal mood and affect. Her behavior is normal.   Nursing note and vitals reviewed.      Assessment:       1. Cellulitis of other specified site        Plan:     Problem List Items Addressed This Visit        ID    Cellulitis - Primary    Current Assessment & Plan     tx with doxycycline orally. Left lower leg. Likely r/t scratching insect bite  Marked area with marker pt will monitor for advancing redness  RTC if failing to improve             Follow-up if symptoms worsen or fail to improve.

## 2018-11-08 ENCOUNTER — OFFICE VISIT (OUTPATIENT)
Dept: INTERNAL MEDICINE | Facility: CLINIC | Age: 63
End: 2018-11-08
Payer: COMMERCIAL

## 2018-11-08 VITALS
WEIGHT: 259.06 LBS | TEMPERATURE: 97 F | BODY MASS INDEX: 41.63 KG/M2 | HEART RATE: 72 BPM | SYSTOLIC BLOOD PRESSURE: 120 MMHG | OXYGEN SATURATION: 99 % | RESPIRATION RATE: 18 BRPM | HEIGHT: 66 IN | DIASTOLIC BLOOD PRESSURE: 75 MMHG

## 2018-11-08 DIAGNOSIS — J40 BRONCHITIS: Primary | ICD-10-CM

## 2018-11-08 DIAGNOSIS — I10 ESSENTIAL HYPERTENSION: ICD-10-CM

## 2018-11-08 DIAGNOSIS — J30.9 ALLERGIC RHINITIS, UNSPECIFIED SEASONALITY, UNSPECIFIED TRIGGER: ICD-10-CM

## 2018-11-08 PROCEDURE — 3074F SYST BP LT 130 MM HG: CPT | Mod: CPTII,S$GLB,, | Performed by: FAMILY MEDICINE

## 2018-11-08 PROCEDURE — 3008F BODY MASS INDEX DOCD: CPT | Mod: CPTII,S$GLB,, | Performed by: FAMILY MEDICINE

## 2018-11-08 PROCEDURE — 90686 IIV4 VACC NO PRSV 0.5 ML IM: CPT | Mod: S$GLB,,, | Performed by: FAMILY MEDICINE

## 2018-11-08 PROCEDURE — 99999 PR PBB SHADOW E&M-EST. PATIENT-LVL III: CPT | Mod: PBBFAC,,, | Performed by: FAMILY MEDICINE

## 2018-11-08 PROCEDURE — 99214 OFFICE O/P EST MOD 30 MIN: CPT | Mod: 25,S$GLB,, | Performed by: FAMILY MEDICINE

## 2018-11-08 PROCEDURE — 90471 IMMUNIZATION ADMIN: CPT | Mod: S$GLB,,, | Performed by: FAMILY MEDICINE

## 2018-11-08 PROCEDURE — 3078F DIAST BP <80 MM HG: CPT | Mod: CPTII,S$GLB,, | Performed by: FAMILY MEDICINE

## 2018-11-08 RX ORDER — PROMETHAZINE HYDROCHLORIDE AND DEXTROMETHORPHAN HYDROBROMIDE 6.25; 15 MG/5ML; MG/5ML
5 SYRUP ORAL NIGHTLY
Qty: 118 ML | Refills: 0 | Status: SHIPPED | OUTPATIENT
Start: 2018-11-08 | End: 2019-01-14

## 2018-11-08 RX ORDER — BENZONATATE 100 MG/1
100 CAPSULE ORAL 3 TIMES DAILY PRN
Qty: 30 CAPSULE | Refills: 0 | Status: SHIPPED | OUTPATIENT
Start: 2018-11-08 | End: 2018-11-18

## 2018-11-08 RX ORDER — ATORVASTATIN CALCIUM 20 MG/1
20 TABLET, FILM COATED ORAL DAILY
Qty: 90 TABLET | Refills: 0 | Status: SHIPPED | OUTPATIENT
Start: 2018-11-08 | End: 2019-02-12 | Stop reason: SDUPTHER

## 2018-11-08 RX ORDER — MONTELUKAST SODIUM 10 MG/1
10 TABLET ORAL NIGHTLY
Qty: 30 TABLET | Refills: 0 | Status: SHIPPED | OUTPATIENT
Start: 2018-11-08 | End: 2019-01-14

## 2018-11-08 NOTE — PROGRESS NOTES
Subjective:       Patient ID: Concepcion Don is a 63 y.o. female.    Chief Complaint: Sore Throat; Cough; and Ear Fullness    URI    This is a new problem. The current episode started 1 to 4 weeks ago. The problem has been gradually improving. There has been no fever. Associated symptoms include congestion and coughing. Pertinent negatives include no abdominal pain, chest pain, headaches, rhinorrhea or sinus pain. Treatments tried: allegra. The treatment provided no relief.     Review of Systems   HENT: Positive for congestion. Negative for rhinorrhea and sinus pain.    Respiratory: Positive for cough.    Cardiovascular: Negative for chest pain.   Gastrointestinal: Negative for abdominal pain.   Neurological: Negative for headaches.       Objective:      Physical Exam   Constitutional: She appears well-developed and well-nourished. She appears distressed.   HENT:   Head: Normocephalic and atraumatic.   Nose: Nose normal. Right sinus exhibits no maxillary sinus tenderness and no frontal sinus tenderness. Left sinus exhibits no maxillary sinus tenderness and no frontal sinus tenderness.   Mouth/Throat: Oropharynx is clear and moist.   Pulmonary/Chest: Effort normal and breath sounds normal. No respiratory distress. She has no wheezes.   Boggy nasal mucosa   Skin: Skin is warm and dry. No rash noted. She is not diaphoretic. No erythema.   Nursing note and vitals reviewed.      Assessment:       1. Bronchitis    2. Essential hypertension    3. Allergic rhinitis, unspecified seasonality, unspecified trigger        Plan:     Problem List Items Addressed This Visit        Pulmonary    Bronchitis - Primary    Relevant Medications    promethazine-dextromethorphan (PROMETHAZINE-DM) 6.25-15 mg/5 mL Syrp    benzonatate (TESSALON) 100 MG capsule       Cardiac/Vascular    Hypertension    Relevant Medications    atorvastatin (LIPITOR) 20 MG tablet       Other    Allergic rhinitis    Relevant Medications    montelukast (SINGULAIR) 10  mg tablet

## 2018-12-18 PROBLEM — K63.5 COLON POLYPS: Status: ACTIVE | Noted: 2017-06-07

## 2018-12-26 DIAGNOSIS — I10 ESSENTIAL HYPERTENSION: ICD-10-CM

## 2018-12-27 DIAGNOSIS — I10 ESSENTIAL HYPERTENSION: ICD-10-CM

## 2018-12-27 RX ORDER — BENAZEPRIL HYDROCHLORIDE AND HYDROCHLOROTHIAZIDE 20; 12.5 MG/1; MG/1
1 TABLET ORAL DAILY
Qty: 90 TABLET | Refills: 0 | Status: SHIPPED | OUTPATIENT
Start: 2018-12-27 | End: 2019-01-14 | Stop reason: SDUPTHER

## 2018-12-27 RX ORDER — BENAZEPRIL HYDROCHLORIDE AND HYDROCHLOROTHIAZIDE 20; 12.5 MG/1; MG/1
TABLET ORAL
Qty: 90 TABLET | Refills: 3 | Status: SHIPPED | OUTPATIENT
Start: 2018-12-27 | End: 2018-12-27

## 2019-01-14 ENCOUNTER — OFFICE VISIT (OUTPATIENT)
Dept: INTERNAL MEDICINE | Facility: CLINIC | Age: 64
End: 2019-01-14
Payer: COMMERCIAL

## 2019-01-14 ENCOUNTER — LAB VISIT (OUTPATIENT)
Dept: LAB | Facility: HOSPITAL | Age: 64
End: 2019-01-14
Attending: FAMILY MEDICINE
Payer: COMMERCIAL

## 2019-01-14 VITALS
OXYGEN SATURATION: 100 % | BODY MASS INDEX: 41.59 KG/M2 | DIASTOLIC BLOOD PRESSURE: 68 MMHG | SYSTOLIC BLOOD PRESSURE: 120 MMHG | RESPIRATION RATE: 19 BRPM | HEART RATE: 74 BPM | WEIGHT: 258.81 LBS | TEMPERATURE: 97 F | HEIGHT: 66 IN

## 2019-01-14 DIAGNOSIS — Z79.899 ENCOUNTER FOR LONG-TERM (CURRENT) USE OF MEDICATIONS: ICD-10-CM

## 2019-01-14 DIAGNOSIS — Z79.899 ENCOUNTER FOR LONG-TERM (CURRENT) USE OF MEDICATIONS: Primary | ICD-10-CM

## 2019-01-14 DIAGNOSIS — I10 ESSENTIAL HYPERTENSION: ICD-10-CM

## 2019-01-14 LAB
ALBUMIN SERPL BCP-MCNC: 3.7 G/DL
ALP SERPL-CCNC: 110 U/L
ALT SERPL W/O P-5'-P-CCNC: 14 U/L
ANION GAP SERPL CALC-SCNC: 9 MMOL/L
AST SERPL-CCNC: 23 U/L
BILIRUB SERPL-MCNC: 0.6 MG/DL
BUN SERPL-MCNC: 15 MG/DL
CALCIUM SERPL-MCNC: 9.3 MG/DL
CHLORIDE SERPL-SCNC: 104 MMOL/L
CO2 SERPL-SCNC: 26 MMOL/L
CREAT SERPL-MCNC: 0.8 MG/DL
EST. GFR  (AFRICAN AMERICAN): >60 ML/MIN/1.73 M^2
EST. GFR  (NON AFRICAN AMERICAN): >60 ML/MIN/1.73 M^2
GLUCOSE SERPL-MCNC: 87 MG/DL
POTASSIUM SERPL-SCNC: 3.8 MMOL/L
PROT SERPL-MCNC: 8.6 G/DL
SODIUM SERPL-SCNC: 139 MMOL/L

## 2019-01-14 PROCEDURE — 3074F PR MOST RECENT SYSTOLIC BLOOD PRESSURE < 130 MM HG: ICD-10-PCS | Mod: CPTII,S$GLB,, | Performed by: FAMILY MEDICINE

## 2019-01-14 PROCEDURE — 99214 OFFICE O/P EST MOD 30 MIN: CPT | Mod: S$GLB,,, | Performed by: FAMILY MEDICINE

## 2019-01-14 PROCEDURE — 3078F PR MOST RECENT DIASTOLIC BLOOD PRESSURE < 80 MM HG: ICD-10-PCS | Mod: CPTII,S$GLB,, | Performed by: FAMILY MEDICINE

## 2019-01-14 PROCEDURE — 99214 PR OFFICE/OUTPT VISIT, EST, LEVL IV, 30-39 MIN: ICD-10-PCS | Mod: S$GLB,,, | Performed by: FAMILY MEDICINE

## 2019-01-14 PROCEDURE — 99999 PR PBB SHADOW E&M-EST. PATIENT-LVL III: CPT | Mod: PBBFAC,,, | Performed by: FAMILY MEDICINE

## 2019-01-14 PROCEDURE — 99999 PR PBB SHADOW E&M-EST. PATIENT-LVL III: ICD-10-PCS | Mod: PBBFAC,,, | Performed by: FAMILY MEDICINE

## 2019-01-14 PROCEDURE — 3078F DIAST BP <80 MM HG: CPT | Mod: CPTII,S$GLB,, | Performed by: FAMILY MEDICINE

## 2019-01-14 PROCEDURE — 36415 COLL VENOUS BLD VENIPUNCTURE: CPT | Mod: PO,ER

## 2019-01-14 PROCEDURE — 3008F PR BODY MASS INDEX (BMI) DOCUMENTED: ICD-10-PCS | Mod: CPTII,S$GLB,, | Performed by: FAMILY MEDICINE

## 2019-01-14 PROCEDURE — 3074F SYST BP LT 130 MM HG: CPT | Mod: CPTII,S$GLB,, | Performed by: FAMILY MEDICINE

## 2019-01-14 PROCEDURE — 3008F BODY MASS INDEX DOCD: CPT | Mod: CPTII,S$GLB,, | Performed by: FAMILY MEDICINE

## 2019-01-14 PROCEDURE — 80053 COMPREHEN METABOLIC PANEL: CPT | Mod: PO,ER

## 2019-01-14 RX ORDER — BENAZEPRIL HYDROCHLORIDE AND HYDROCHLOROTHIAZIDE 20; 12.5 MG/1; MG/1
1 TABLET ORAL DAILY
Qty: 90 TABLET | Refills: 1 | Status: SHIPPED | OUTPATIENT
Start: 2019-01-14 | End: 2019-07-16 | Stop reason: SDUPTHER

## 2019-01-14 NOTE — PROGRESS NOTES
Subjective:       Patient ID: Concepcion Don is a 63 y.o. female.    Chief Complaint: Follow-up (HTN)    Hypertension   This is a chronic problem. The current episode started more than 1 year ago. The problem has been resolved since onset. The problem is controlled. Pertinent negatives include no anxiety, blurred vision, chest pain, headaches or shortness of breath. There are no associated agents to hypertension.     Review of Systems   Eyes: Negative for blurred vision.   Respiratory: Negative for shortness of breath.    Cardiovascular: Negative for chest pain.   Gastrointestinal: Negative for abdominal pain.   Neurological: Negative for headaches.       Objective:      Physical Exam   Constitutional: She appears well-developed and well-nourished. No distress.   HENT:   Head: Normocephalic and atraumatic.   Nose: Nose normal.   Mouth/Throat: Oropharynx is clear and moist.   Cardiovascular: Normal rate, regular rhythm, normal heart sounds and intact distal pulses.   No murmur heard.  Pulmonary/Chest: Effort normal and breath sounds normal. No respiratory distress. She has no wheezes.   Skin: Skin is warm and dry. No rash noted. She is not diaphoretic. No erythema.   Nursing note and vitals reviewed.      Assessment:       1. Encounter for long-term (current) use of medications    2. Essential hypertension        Plan:     Problem List Items Addressed This Visit        Psychiatric    Encounter for long-term (current) use of medications - Primary    Relevant Orders    Comprehensive metabolic panel       Cardiac/Vascular    Hypertension    Relevant Medications    benazepril-hydrochlorthiazide (LOTENSIN HCT) 20-12.5 mg per tablet

## 2019-02-12 DIAGNOSIS — I10 ESSENTIAL HYPERTENSION: ICD-10-CM

## 2019-02-12 RX ORDER — ATORVASTATIN CALCIUM 20 MG/1
20 TABLET, FILM COATED ORAL DAILY
Qty: 90 TABLET | Refills: 0 | Status: SHIPPED | OUTPATIENT
Start: 2019-02-12 | End: 2019-05-15 | Stop reason: SDUPTHER

## 2019-04-09 ENCOUNTER — HOSPITAL ENCOUNTER (OUTPATIENT)
Dept: CARDIOLOGY | Facility: CLINIC | Age: 64
Discharge: HOME | End: 2019-04-09
Payer: COMMERCIAL

## 2019-04-09 ENCOUNTER — HOSPITAL ENCOUNTER (OUTPATIENT)
Dept: RADIOLOGY | Facility: HOSPITAL | Age: 64
Discharge: HOME | End: 2019-04-09
Attending: FAMILY MEDICINE
Payer: COMMERCIAL

## 2019-04-09 ENCOUNTER — OFFICE VISIT (OUTPATIENT)
Dept: INTERNAL MEDICINE | Facility: CLINIC | Age: 64
End: 2019-04-09
Payer: COMMERCIAL

## 2019-04-09 VITALS
SYSTOLIC BLOOD PRESSURE: 111 MMHG | HEART RATE: 70 BPM | BODY MASS INDEX: 42.24 KG/M2 | WEIGHT: 262.81 LBS | HEIGHT: 66 IN | RESPIRATION RATE: 17 BRPM | DIASTOLIC BLOOD PRESSURE: 62 MMHG | TEMPERATURE: 97 F | OXYGEN SATURATION: 99 %

## 2019-04-09 DIAGNOSIS — E07.9 THYROID DISEASE: ICD-10-CM

## 2019-04-09 DIAGNOSIS — R07.9 CHEST PAIN: ICD-10-CM

## 2019-04-09 DIAGNOSIS — E66.01 MORBID OBESITY WITH BMI OF 40.0-44.9, ADULT: ICD-10-CM

## 2019-04-09 DIAGNOSIS — R07.89 CHEST DISCOMFORT: ICD-10-CM

## 2019-04-09 DIAGNOSIS — I10 ESSENTIAL HYPERTENSION: ICD-10-CM

## 2019-04-09 DIAGNOSIS — Z79.899 ENCOUNTER FOR LONG-TERM (CURRENT) USE OF MEDICATIONS: ICD-10-CM

## 2019-04-09 DIAGNOSIS — R07.9 CHEST PAIN: Primary | ICD-10-CM

## 2019-04-09 PROBLEM — L03.90 CELLULITIS: Status: RESOLVED | Noted: 2018-09-21 | Resolved: 2019-04-09

## 2019-04-09 PROCEDURE — 99215 PR OFFICE/OUTPT VISIT, EST, LEVL V, 40-54 MIN: ICD-10-PCS | Mod: S$GLB,,, | Performed by: FAMILY MEDICINE

## 2019-04-09 PROCEDURE — 71046 X-RAY EXAM CHEST 2 VIEWS: CPT | Mod: 26,,, | Performed by: RADIOLOGY

## 2019-04-09 PROCEDURE — 3008F BODY MASS INDEX DOCD: CPT | Mod: CPTII,S$GLB,, | Performed by: FAMILY MEDICINE

## 2019-04-09 PROCEDURE — 71046 X-RAY EXAM CHEST 2 VIEWS: CPT | Mod: TC,PO

## 2019-04-09 PROCEDURE — 3078F DIAST BP <80 MM HG: CPT | Mod: CPTII,S$GLB,, | Performed by: FAMILY MEDICINE

## 2019-04-09 PROCEDURE — 99999 PR PBB SHADOW E&M-EST. PATIENT-LVL IV: ICD-10-PCS | Mod: PBBFAC,,, | Performed by: FAMILY MEDICINE

## 2019-04-09 PROCEDURE — 3008F PR BODY MASS INDEX (BMI) DOCUMENTED: ICD-10-PCS | Mod: CPTII,S$GLB,, | Performed by: FAMILY MEDICINE

## 2019-04-09 PROCEDURE — 3074F SYST BP LT 130 MM HG: CPT | Mod: CPTII,S$GLB,, | Performed by: FAMILY MEDICINE

## 2019-04-09 PROCEDURE — 3074F PR MOST RECENT SYSTOLIC BLOOD PRESSURE < 130 MM HG: ICD-10-PCS | Mod: CPTII,S$GLB,, | Performed by: FAMILY MEDICINE

## 2019-04-09 PROCEDURE — 93000 ELECTROCARDIOGRAM COMPLETE: CPT | Mod: S$GLB,,, | Performed by: INTERNAL MEDICINE

## 2019-04-09 PROCEDURE — 3078F PR MOST RECENT DIASTOLIC BLOOD PRESSURE < 80 MM HG: ICD-10-PCS | Mod: CPTII,S$GLB,, | Performed by: FAMILY MEDICINE

## 2019-04-09 PROCEDURE — 99999 PR PBB SHADOW E&M-EST. PATIENT-LVL IV: CPT | Mod: PBBFAC,,, | Performed by: FAMILY MEDICINE

## 2019-04-09 PROCEDURE — 99215 OFFICE O/P EST HI 40 MIN: CPT | Mod: S$GLB,,, | Performed by: FAMILY MEDICINE

## 2019-04-09 PROCEDURE — 71046 XR CHEST PA AND LATERAL: ICD-10-PCS | Mod: 26,,, | Performed by: RADIOLOGY

## 2019-04-09 PROCEDURE — 93000 EKG 12-LEAD: ICD-10-PCS | Mod: S$GLB,,, | Performed by: INTERNAL MEDICINE

## 2019-04-09 RX ORDER — DOXYCYCLINE 100 MG/1
100 CAPSULE ORAL EVERY 12 HOURS
Qty: 14 CAPSULE | Refills: 0 | Status: SHIPPED | OUTPATIENT
Start: 2019-04-09 | End: 2019-05-13

## 2019-04-09 RX ORDER — LEVOTHYROXINE SODIUM 25 UG/1
25 TABLET ORAL DAILY
Qty: 90 TABLET | Refills: 0 | Status: SHIPPED | OUTPATIENT
Start: 2019-04-09 | End: 2019-07-16 | Stop reason: SDUPTHER

## 2019-04-09 NOTE — PROGRESS NOTES
Pt has a small infiltrate, this could be the start of possible PNA.  How is she feeling?  I will place her on abx empirically.

## 2019-04-10 ENCOUNTER — OFFICE VISIT (OUTPATIENT)
Dept: CARDIOLOGY | Facility: CLINIC | Age: 64
End: 2019-04-10
Payer: COMMERCIAL

## 2019-04-10 VITALS
SYSTOLIC BLOOD PRESSURE: 120 MMHG | BODY MASS INDEX: 42.31 KG/M2 | WEIGHT: 263.25 LBS | DIASTOLIC BLOOD PRESSURE: 80 MMHG | HEIGHT: 66 IN | HEART RATE: 76 BPM

## 2019-04-10 DIAGNOSIS — I10 ESSENTIAL HYPERTENSION: ICD-10-CM

## 2019-04-10 DIAGNOSIS — R07.9 ACUTE CHEST PAIN: ICD-10-CM

## 2019-04-10 DIAGNOSIS — E78.49 OTHER HYPERLIPIDEMIA: ICD-10-CM

## 2019-04-10 DIAGNOSIS — R79.89 POSITIVE D DIMER: ICD-10-CM

## 2019-04-10 DIAGNOSIS — R07.89 OTHER CHEST PAIN: ICD-10-CM

## 2019-04-10 DIAGNOSIS — R60.0 LOCALIZED EDEMA: ICD-10-CM

## 2019-04-10 DIAGNOSIS — R07.89 CHEST DISCOMFORT: Primary | ICD-10-CM

## 2019-04-10 DIAGNOSIS — E07.9 THYROID DISEASE: ICD-10-CM

## 2019-04-10 PROCEDURE — 99245 PR OFFICE CONSULTATION,LEVEL V: ICD-10-PCS | Mod: S$GLB,,, | Performed by: INTERNAL MEDICINE

## 2019-04-10 PROCEDURE — 99999 PR PBB SHADOW E&M-EST. PATIENT-LVL III: ICD-10-PCS | Mod: PBBFAC,,, | Performed by: INTERNAL MEDICINE

## 2019-04-10 PROCEDURE — 99245 OFF/OP CONSLTJ NEW/EST HI 55: CPT | Mod: S$GLB,,, | Performed by: INTERNAL MEDICINE

## 2019-04-10 PROCEDURE — 99999 PR PBB SHADOW E&M-EST. PATIENT-LVL III: CPT | Mod: PBBFAC,,, | Performed by: INTERNAL MEDICINE

## 2019-04-10 RX ORDER — MELOXICAM 7.5 MG/1
7.5 TABLET ORAL 2 TIMES DAILY
COMMUNITY
End: 2019-07-02 | Stop reason: DRUGHIGH

## 2019-04-10 RX ORDER — ASPIRIN 81 MG/1
81 TABLET ORAL DAILY
COMMUNITY
End: 2021-08-13 | Stop reason: SDUPTHER

## 2019-04-10 NOTE — PATIENT INSTRUCTIONS
Understanding Chronic Venous Insufficiency  Problems with the veins in the legs may lead to chronic venous insufficiency (CVI). CVI means that there is a long-term problem with the veins not being able to pump blood back to your heart. When this happens, blood stays in the legs and causes swelling and aching.   Two problems that may lead to chronic venous insufficiency are:  · Damaged valves. Valves keep blood flowing from the legs through the blood vessels and back to the heart. When the valves are damaged, blood does not flow as well.   · Deep vein thrombosis (DVT). Blood clots may form in the deep veins of the legs. This may cause pain, redness, and swelling in the legs. It may also block the flow of blood back to the heart. Seek immediate medical care if you have these symptoms.  · A blood clot in the leg can also break off and travel to the lungs. This is called pulmonary embolism (PE). In the lungs, the clot can cut off the flow of blood. This may cause chest pain, trouble breathing, sweating, a fast heartbeat, coughing (may cough up blood), and fainting. It is a medical emergency and may cause death. Call 911 if you have these symptoms.  · Healthcare providers call the two conditions, DVT and PE, venous thromboembolism (VTE).  CVI cant be cured, but you can control leg swelling to reduce the likelihood of ulcers (sores).  Recognizing the symptoms  Be aware of the following:  · If you stand or sit with your feet down for long periods, your legs may ache or feel heavy.  · Swollen ankles are possibly the most common symptom of CVI.  · As swelling increases, the skin over your ankles may show red spots or a brownish tinge. The skin may feel leathery or scaly, and may start to itch.  · If swelling is not controlled, an ulcer (open wound) may form.  What you can do  Reduce your risk of developing ulcers by doing the following:  · Increase blood flow back to your heart by elevating your legs, exercising daily,  and wearing elastic stockings.  · Boost blood flow in your legs by losing excess weight.  · If you must stand or sit in one place for a period of time, keep your blood moving by wiggling your toes, shifting your body position, and rising up on the balls of your feet.    Date Last Reviewed: 5/1/2016  © 3822-3290 [employer]. [address] [place] [address]. All rights reserved. This information is not intended as a substitute for professional medical care. Always follow your healthcare professional's instructions.          Noncardiac Chest Pain    Based on your visit today, the healthcare provider doesnt know what is causing your chest pain. In most cases, people who come to the emergency department with chest pain dont have a problem with their heart. Instead, the pain is caused by other conditions. It's important for the healthcare team to be sure you are not having a life threatening cause for chest pain such as a heart attack, blood clot in the lungs, collapsed lung, ruptured esophagus, or tearing of the aorta. Once these major causes have been ruled out, you may have further evaluation for non-heart causes of chest pain. These may be problems with the lungs, muscles, bones, digestive tract, nerves, or mental health.  Lung problems  · Inflammation around the lungs (pleurisy)  · Collapsed lung (pneumothorax)  · Fluid around the lungs (pleural effusion)  · Lung cancer (a rare cause of chest pain)  Muscle or bone problems  · Inflamed cartilage between the ribs (costochondritis)  · Fibromyalgia  · Rheumatoid arthritis  · Chest wall strain  Digestive system problems  · Reflux  · Stomach ulcer  · Spasms of the esophagus  · Gall stones  · Gallbladder inflammation  Mental health conditions  · Panic or anxiety attacks  · Emotional distress  Your condition doesnt seem serious and your pain doesnt appear to be coming from your heart. But sometimes the signs of a serious problem take more time to  appear. Watch for the warning signs listed below.  Home care  Follow these guidelines when caring for yourself at home:  · Rest today and avoid strenuous activity.  · Take any prescribed medicine as directed.  Follow-up care  Follow up with your healthcare provider, or as advised, if you dont start to feel better within 24 hours.  When to seek medical advice  Call your healthcare provider right away if any of these occur:  · A change in the type of pain. Call if it feels different, becomes more serious, lasts longer, or begins to spread into your shoulder, arm, neck, jaw, or back.  · Shortness of breath  · You feel more pain when you breathe  · Cough with dark-colored mucus or blood  · Weakness, dizziness, or fainting  · Fever of 100.4ºF (38ºC) or higher, or as directed by your healthcare provider  · Swelling, pain, or redness in one leg  Date Last Reviewed: 12/1/2016  © 1115-9039 [employer]. [address] [place] [address], [place] [address]. All rights reserved. This information is not intended as a substitute for professional medical care. Always follow your healthcare professional's instructions.

## 2019-04-10 NOTE — LETTER
April 11, 2019      Christiano Myas [place]  [id number] [address] [address]           [facility]  [address] [facility] [address]  Phone: [phone]  Fax: [phone]          Patient: Concepcion Don   MR Number: [id number]   YOB: 1955   Date of Visit: 4/10/2019       Dear Dr. Christiano Mays:    Thank you for referring Concepcion Don to me for evaluation. Attached you will find relevant portions of my assessment and plan of care.    If you have questions, please do not hesitate to call me. I look forward to following Concepcion Don along with you.    Sincerely,    Dean Weeks MD    Enclosure  CC:  No Recipients    If you would like to receive this communication electronically, please contact [email or web address] or [phone] to request more information on [employer] access.    For providers and/or their staff who would like to refer a patient to [facility], please contact us through our one-stop-shop provider referral line, Dave Espinosa, at [phone].    If you feel you have received this communication in error or would no longer like to receive these types of communications, please e-mail [email or web address]

## 2019-04-10 NOTE — PROGRESS NOTES
Subjective:   Patient ID:  Concepcion Don is a 63 y.o. female who presents for cardiac consult of Hypertension (elevated CK--D-Dimer)      HPI  The patient came in today for cardiac consult of Hypertension (elevated CK--D-Dimer)    Referring Physician: Christiano Mays MD   Reason for consult: CP, SOB, elevated D dimer     4/10/19  Concepcion Don is a 63 y.o. female pt with current medical conditions HTN, HLD, obesity, hypothyroidism presents for initial CV evaluation of CP/SOB.     Pt had recent labwork with elevated D dimer. BNP, Trop negative. . She feels chest pain, center of chest. Pain initially started after picking up grandson. Pain has not gone away. No pleuritic type pain. Takes Tylenol for pain occasionally. Does have leg swelling, worse with sitting down. NO palpitations. Has mild sinus infection, started Mucinex.     Patient feels  no PND, no palpitation, no dizziness, no syncope, no CNS symptoms.    Patient is compliant with medications.      Past Medical History:   Diagnosis Date    Cellulitis 2018    Hyperlipidemia     Hypertension     Thyroid disease        Past Surgical History:   Procedure Laterality Date     SECTION, CLASSIC      HYSTERECTOMY      KNEE SURGERY      repleacement right knee    ROTATOR CUFF REPAIR Right        Social History     Tobacco Use    Smoking status: Never Smoker    Smokeless tobacco: Never Used   Substance Use Topics    Alcohol use: Yes     Comment: occasional    Drug use: No       Family History   Problem Relation Age of Onset    Kidney disease Mother     Hypertension Mother     Diabetes Mother     Hypertension Father     Stroke Father        Patient's Medications   New Prescriptions    No medications on file   Previous Medications    ACETAMINOPHEN (TYLENOL) 500 MG TABLET    Take 1 tablet (500 mg total) by mouth every 6 (six) hours as needed for Pain.    ASPIRIN (ECOTRIN) 81 MG EC TABLET    Take 81 mg by mouth once daily.     ATORVASTATIN (LIPITOR) 20 MG TABLET    Take 1 tablet (20 mg total) by mouth once daily.    B COMPLEX VITAMINS TABLET    Take 1 tablet by mouth once daily.    BENAZEPRIL-HYDROCHLORTHIAZIDE (LOTENSIN HCT) 20-12.5 MG PER TABLET    Take 1 tablet by mouth once daily.    CALCIUM CARBONATE (OS-JENNIFER) 500 MG CALCIUM (1,250 MG) TABLET    Take 2 tablets (1,000 mg total) by mouth once daily.    DOCUSATE CALCIUM (STOOL SOFTENER ORAL)    Take 1 tablet by mouth once daily.     DOXYCYCLINE (VIBRAMYCIN) 100 MG CAP    Take 1 capsule (100 mg total) by mouth every 12 (twelve) hours.    GLUCOSAMINE SULFATE (GLUCOSAMINE ORAL)    Take 1 tablet by mouth once daily.     LEVOTHYROXINE (SYNTHROID) 25 MCG TABLET    Take 1 tablet (25 mcg total) by mouth once daily.    MELOXICAM (MOBIC) 7.5 MG TABLET    Take 7.5 mg by mouth 2 (two) times daily.    MULTIVITAMIN CAPSULE    Take 1 capsule by mouth once daily.    OMEGA 3-DHA-EPA-FISH -500-1,000 MG CAP    Take 1 capsule by mouth once daily.     PSYLLIUM SEED, WITH DEXTROSE, (FIBER ORAL)    Take by mouth once daily.   Modified Medications    No medications on file   Discontinued Medications    ASPIRIN 325 MG TABLET    Take 81 mg by mouth once daily.        Review of Systems   Constitutional: Negative.    HENT: Negative.    Eyes: Negative.    Respiratory: Negative.    Cardiovascular: Positive for chest pain and leg swelling.   Gastrointestinal: Negative.    Genitourinary: Negative.    Musculoskeletal: Negative.    Skin: Negative.    Neurological: Negative.    Endo/Heme/Allergies: Negative.    Psychiatric/Behavioral: Negative.    All 12 systems otherwise negative.      Wt Readings from Last 3 Encounters:   04/10/19 119.4 kg (263 lb 3.7 oz)   04/09/19 119.2 kg (262 lb 12.6 oz)   01/14/19 117.4 kg (258 lb 13.1 oz)     Temp Readings from Last 3 Encounters:   04/09/19 96.9 °F (36.1 °C) (Tympanic)   01/14/19 97.3 °F (36.3 °C) (Tympanic)   11/08/18 97 °F (36.1 °C) (Tympanic)     BP Readings from Last 3  "Encounters:   04/10/19 120/80   04/09/19 111/62   01/14/19 120/68     Pulse Readings from Last 3 Encounters:   04/10/19 76   04/09/19 70   01/14/19 74       /80 (BP Method: Large (Manual))   Pulse 76   Ht 5' 6" (1.676 m)   Wt 119.4 kg (263 lb 3.7 oz)   BMI 42.49 kg/m²     Objective:   Physical Exam   Constitutional: She is oriented to person, place, and time. She appears well-developed and well-nourished. No distress.   HENT:   Head: Normocephalic and atraumatic.   Nose: Nose normal.   Mouth/Throat: Oropharynx is clear and moist.   Eyes: Conjunctivae and EOM are normal. No scleral icterus.   Neck: Normal range of motion. Neck supple. No JVD present. No thyromegaly present.   Cardiovascular: Normal rate, regular rhythm, S1 normal and S2 normal. Exam reveals no gallop, no S3, no S4 and no friction rub.   No murmur heard.  Pulmonary/Chest: Effort normal and breath sounds normal. No stridor. No respiratory distress. She has no wheezes. She has no rales. She exhibits no tenderness.   Abdominal: Soft. Bowel sounds are normal. She exhibits no distension and no mass. There is no tenderness. There is no rebound.   Genitourinary:   Genitourinary Comments: Deferred   Musculoskeletal: Normal range of motion. She exhibits no edema, tenderness or deformity.   Lymphadenopathy:     She has no cervical adenopathy.   Neurological: She is alert and oriented to person, place, and time. She exhibits normal muscle tone. Coordination normal.   Skin: Skin is warm and dry. No rash noted. She is not diaphoretic. No erythema. No pallor.   Psychiatric: She has a normal mood and affect. Her behavior is normal. Judgment and thought content normal.   Nursing note and vitals reviewed.      Lab Results   Component Value Date     04/09/2019    K 4.0 04/09/2019     04/09/2019    CO2 29 04/09/2019    BUN 14 04/09/2019    CREATININE 0.8 04/09/2019    GLU 87 04/09/2019    MG 1.9 06/25/2009    AST 22 04/09/2019    ALT 18 04/09/2019 "    ALBUMIN 3.6 04/09/2019    PROT 8.6 (H) 04/09/2019    BILITOT 0.6 04/09/2019    WBC 6.97 04/09/2019    HGB 12.5 04/09/2019    HCT 39.8 04/09/2019    MCV 89 04/09/2019     04/09/2019    TSH 1.619 04/09/2019    CHOL 124 04/09/2019    HDL 45 04/09/2019    LDLCALC 65.4 04/09/2019    TRIG 68 04/09/2019    BNP 40 04/09/2019     Assessment:      1. Chest discomfort    2. Essential hypertension    3. Other hyperlipidemia    4. Thyroid disease    5. Acute chest pain    6. Other chest pain    7. Positive D dimer    8. Localized edema        Plan:   1. Chest pain, elevated D dimer  - CT PE ordered  - 2D ECHO eval RV strain and valves  - LE u/s r/o DVT  - stress once PE r/o    2. HTN  - cont meds    3. HLD  - cont statin    4. Obesity  - rec weight loss with diet/exercise     5. Hypothyroidism  - cont meds per PCP    Thank you for allowing me to participate in this patient's care. Please do not hesitate to contact me with any questions or concerns. Consult note has been forwarded to the referral physician.

## 2019-04-11 ENCOUNTER — HOSPITAL ENCOUNTER (OUTPATIENT)
Dept: RADIOLOGY | Facility: HOSPITAL | Age: 64
Discharge: HOME | End: 2019-04-11
Attending: INTERNAL MEDICINE
Payer: COMMERCIAL

## 2019-04-11 DIAGNOSIS — R07.9 ACUTE CHEST PAIN: ICD-10-CM

## 2019-04-11 DIAGNOSIS — R79.89 POSITIVE D DIMER: ICD-10-CM

## 2019-04-11 DIAGNOSIS — R07.89 OTHER CHEST PAIN: ICD-10-CM

## 2019-04-11 PROCEDURE — 71275 CTA CHEST NON CORONARY: ICD-10-PCS | Mod: 26,,, | Performed by: RADIOLOGY

## 2019-04-11 PROCEDURE — 25500020 PHARM REV CODE 255: Mod: PO | Performed by: INTERNAL MEDICINE

## 2019-04-11 PROCEDURE — 71275 CT ANGIOGRAPHY CHEST: CPT | Mod: 26,,, | Performed by: RADIOLOGY

## 2019-04-11 PROCEDURE — 71275 CT ANGIOGRAPHY CHEST: CPT | Mod: TC,PO

## 2019-04-11 RX ADMIN — IOHEXOL 100 ML: 350 INJECTION, SOLUTION INTRAVENOUS at 07:04

## 2019-04-13 PROBLEM — J40 BRONCHITIS: Status: RESOLVED | Noted: 2018-11-08 | Resolved: 2019-04-13

## 2019-04-13 PROBLEM — R07.9 CHEST PAIN: Status: ACTIVE | Noted: 2019-04-13

## 2019-04-13 PROBLEM — H00.011 HORDEOLUM EXTERNUM OF RIGHT UPPER EYELID: Status: RESOLVED | Noted: 2018-04-23 | Resolved: 2019-04-13

## 2019-04-13 PROBLEM — E66.01 MORBID OBESITY WITH BMI OF 40.0-44.9, ADULT: Status: ACTIVE | Noted: 2019-04-13

## 2019-04-13 PROBLEM — J34.89 NASAL SORE: Status: RESOLVED | Noted: 2018-08-29 | Resolved: 2019-04-13

## 2019-04-15 ENCOUNTER — PATIENT MESSAGE (OUTPATIENT)
Dept: CARDIOLOGY | Facility: CLINIC | Age: 64
End: 2019-04-15

## 2019-04-24 ENCOUNTER — PATIENT MESSAGE (OUTPATIENT)
Dept: CARDIOLOGY | Facility: CLINIC | Age: 64
End: 2019-04-24

## 2019-05-10 ENCOUNTER — HOSPITAL ENCOUNTER (OUTPATIENT)
Dept: CARDIOLOGY | Facility: CLINIC | Age: 64
Discharge: HOME | End: 2019-05-10
Attending: INTERNAL MEDICINE
Payer: COMMERCIAL

## 2019-05-10 DIAGNOSIS — R60.0 LOCALIZED EDEMA: ICD-10-CM

## 2019-05-10 DIAGNOSIS — R79.89 POSITIVE D DIMER: ICD-10-CM

## 2019-05-10 DIAGNOSIS — R07.89 OTHER CHEST PAIN: ICD-10-CM

## 2019-05-10 LAB
DIASTOLIC DYSFUNCTION: NO
ESTIMATED PA SYSTOLIC PRESSURE: 25.09
MITRAL VALVE MOBILITY: NORMAL
MITRAL VALVE REGURGITATION: NORMAL
RETIRED EF AND QEF - SEE NOTES: 60 (ref 55–65)
TRICUSPID VALVE REGURGITATION: NORMAL

## 2019-05-10 PROCEDURE — 93970 EXTREMITY STUDY: CPT | Mod: S$GLB,,, | Performed by: INTERNAL MEDICINE

## 2019-05-10 PROCEDURE — 93306 2D ECHO WITH COLOR FLOW DOPPLER: ICD-10-PCS | Mod: S$GLB,,, | Performed by: INTERNAL MEDICINE

## 2019-05-10 PROCEDURE — 93306 TTE W/DOPPLER COMPLETE: CPT | Mod: S$GLB,,, | Performed by: INTERNAL MEDICINE

## 2019-05-10 PROCEDURE — 93970 CAR US DOPPLER VENOUS LEGS BILATERAL: ICD-10-PCS | Mod: S$GLB,,, | Performed by: INTERNAL MEDICINE

## 2019-05-13 ENCOUNTER — OFFICE VISIT (OUTPATIENT)
Dept: INTERNAL MEDICINE | Facility: CLINIC | Age: 64
End: 2019-05-13
Payer: COMMERCIAL

## 2019-05-13 VITALS
RESPIRATION RATE: 19 BRPM | WEIGHT: 266.13 LBS | HEART RATE: 75 BPM | HEIGHT: 66 IN | BODY MASS INDEX: 42.77 KG/M2 | OXYGEN SATURATION: 99 % | SYSTOLIC BLOOD PRESSURE: 124 MMHG | DIASTOLIC BLOOD PRESSURE: 68 MMHG | TEMPERATURE: 97 F

## 2019-05-13 DIAGNOSIS — L03.116 CELLULITIS OF LEFT LOWER EXTREMITY: Primary | ICD-10-CM

## 2019-05-13 PROCEDURE — 3074F PR MOST RECENT SYSTOLIC BLOOD PRESSURE < 130 MM HG: ICD-10-PCS | Mod: CPTII,S$GLB,, | Performed by: FAMILY MEDICINE

## 2019-05-13 PROCEDURE — 3078F DIAST BP <80 MM HG: CPT | Mod: CPTII,S$GLB,, | Performed by: FAMILY MEDICINE

## 2019-05-13 PROCEDURE — 3074F SYST BP LT 130 MM HG: CPT | Mod: CPTII,S$GLB,, | Performed by: FAMILY MEDICINE

## 2019-05-13 PROCEDURE — 99999 PR PBB SHADOW E&M-EST. PATIENT-LVL IV: CPT | Mod: PBBFAC,,, | Performed by: FAMILY MEDICINE

## 2019-05-13 PROCEDURE — 99999 PR PBB SHADOW E&M-EST. PATIENT-LVL IV: ICD-10-PCS | Mod: PBBFAC,,, | Performed by: FAMILY MEDICINE

## 2019-05-13 PROCEDURE — 3078F PR MOST RECENT DIASTOLIC BLOOD PRESSURE < 80 MM HG: ICD-10-PCS | Mod: CPTII,S$GLB,, | Performed by: FAMILY MEDICINE

## 2019-05-13 PROCEDURE — 99214 OFFICE O/P EST MOD 30 MIN: CPT | Mod: S$GLB,,, | Performed by: FAMILY MEDICINE

## 2019-05-13 PROCEDURE — 3008F BODY MASS INDEX DOCD: CPT | Mod: CPTII,S$GLB,, | Performed by: FAMILY MEDICINE

## 2019-05-13 PROCEDURE — 99214 PR OFFICE/OUTPT VISIT, EST, LEVL IV, 30-39 MIN: ICD-10-PCS | Mod: S$GLB,,, | Performed by: FAMILY MEDICINE

## 2019-05-13 PROCEDURE — 3008F PR BODY MASS INDEX (BMI) DOCUMENTED: ICD-10-PCS | Mod: CPTII,S$GLB,, | Performed by: FAMILY MEDICINE

## 2019-05-13 RX ORDER — CLINDAMYCIN HYDROCHLORIDE 300 MG/1
300 CAPSULE ORAL EVERY 8 HOURS
Qty: 21 CAPSULE | Refills: 0 | Status: SHIPPED | OUTPATIENT
Start: 2019-05-13 | End: 2019-05-15 | Stop reason: SDUPTHER

## 2019-05-13 RX ORDER — TRIAMCINOLONE ACETONIDE 5 MG/G
CREAM TOPICAL 2 TIMES DAILY
Qty: 30 G | Refills: 0 | Status: SHIPPED | OUTPATIENT
Start: 2019-05-13 | End: 2020-10-02

## 2019-05-13 NOTE — PATIENT INSTRUCTIONS
Discharge Instructions for Cellulitis  You have been diagnosed with cellulitis. This is an infection in the deepest layer of the skin. In some cases, the infection also affects the muscle. Cellulitis is caused by bacteria. The bacteria can enter the body through broken skin. This can happen with a cut, scratch, animal bite, or an insect bite that has been scratched. You may have been treated in the hospital with antibiotics and fluids. You will likely be given a prescription for antibiotics to take at home. This sheet will help you take care of yourself at home.  Home care  When you are home:  · Take the prescribed antibiotic medicine you are given as directed until it is gone. Take it even if you feel better. It treats the infection and stops it from returning. Not taking all the medicine can make future infections hard to treat.  · Keep the infected area clean.  · When possible, raise the infected area above the level of your heart. This helps keep swelling down.  · Talk with your healthcare provider if you are in pain. Ask what kind of over-the-counter medicine you can take for pain.  · Apply clean bandages as advised.  · Take your temperature once a day for a week.  · Wash your hands often to prevent spreading the infection.  In the future, wash your hands before and after you touch cuts, scratches, or bandages. This will help prevent infection.   When to call your healthcare provider  Call your healthcare provider immediately if you have any of the following:  · Difficulty or pain when moving the joints above or below the infected area  · Discharge or pus draining from the area  · Fever of 100.4°F (38°C) or higher, or as directed by your healthcare provider  · Pain that gets worse in or around the infected   · Redness that gets worse in or around the infected area, particularly if the area of redness expands to a wider area  · Shaking chills  · Swelling of the infected area  · Vomiting   Date Last Reviewed:  8/1/2016  © 1203-6118 [employer]. [address] [place] [address], [place] [address]. All rights reserved. This information is not intended as a substitute for professional medical care. Always follow your healthcare professional's instructions.

## 2019-05-13 NOTE — PROGRESS NOTES
Subjective:       Patient ID: Concepcion Don is a 63 y.o. female.    Chief Complaint: Leg Swelling    Inj - 5/9/19 - saw ortho for left knee injection.  Redness and swelling to left knee on Saturday 5/11/19.    Rash   This is a new problem. The current episode started in the past 7 days. The problem has been rapidly improving since onset. Location: left knee. Rash characteristics: redness, swelling. She was exposed to nothing. Pertinent negatives include no congestion, cough, fever, rhinorrhea or shortness of breath. Past treatments include nothing. The treatment provided no relief.     Review of Systems   Constitutional: Negative for fever.   HENT: Negative for congestion and rhinorrhea.    Respiratory: Negative for cough and shortness of breath.    Musculoskeletal: Negative for gait problem and myalgias.   Skin: Positive for rash.       Objective:      Physical Exam   Constitutional: She appears well-developed and well-nourished. She appears distressed.   HENT:   Head: Normocephalic and atraumatic.   Pulmonary/Chest: Effort normal and breath sounds normal. No respiratory distress. She has no wheezes.   Musculoskeletal: Normal range of motion. She exhibits edema and tenderness. She exhibits no deformity.   Only mild ttp of left knee. Full ROM of knee.      Skin: Skin is warm and dry. Rash noted. She is not diaphoretic. No erythema.   Nursing note and vitals reviewed.      Assessment:       1. Cellulitis of left lower extremity        Plan:     Problem List Items Addressed This Visit        ID    Cellulitis of left lower extremity - Primary    Current Assessment & Plan     Concern for cellulitis given recent injection history, will treat with clinda.  Area is not ttp tho which also lends that this may be a non-specific rash. Will add triamcinolone cream.         Relevant Medications    clindamycin (CLEOCIN) 300 MG capsule    triamcinolone acetonide 0.5% (KENALOG) 0.5 % Crea

## 2019-05-13 NOTE — ASSESSMENT & PLAN NOTE
Concern for cellulitis given recent injection history, will treat with clinda.  Area is not ttp tho which also lends that this may be a non-specific rash. Will add triamcinolone cream.

## 2019-05-15 ENCOUNTER — OFFICE VISIT (OUTPATIENT)
Dept: CARDIOLOGY | Facility: CLINIC | Age: 64
End: 2019-05-15
Payer: COMMERCIAL

## 2019-05-15 ENCOUNTER — OFFICE VISIT (OUTPATIENT)
Dept: INTERNAL MEDICINE | Facility: CLINIC | Age: 64
End: 2019-05-15
Payer: COMMERCIAL

## 2019-05-15 VITALS
HEIGHT: 66 IN | HEART RATE: 69 BPM | RESPIRATION RATE: 19 BRPM | SYSTOLIC BLOOD PRESSURE: 110 MMHG | BODY MASS INDEX: 42.24 KG/M2 | DIASTOLIC BLOOD PRESSURE: 62 MMHG | OXYGEN SATURATION: 98 % | WEIGHT: 262.81 LBS | TEMPERATURE: 97 F

## 2019-05-15 VITALS
WEIGHT: 260.13 LBS | SYSTOLIC BLOOD PRESSURE: 100 MMHG | HEIGHT: 66 IN | BODY MASS INDEX: 41.8 KG/M2 | DIASTOLIC BLOOD PRESSURE: 70 MMHG | HEART RATE: 88 BPM

## 2019-05-15 DIAGNOSIS — R60.0 LOCALIZED EDEMA: ICD-10-CM

## 2019-05-15 DIAGNOSIS — E78.49 OTHER HYPERLIPIDEMIA: ICD-10-CM

## 2019-05-15 DIAGNOSIS — L03.818 CELLULITIS OF OTHER SPECIFIED SITE: ICD-10-CM

## 2019-05-15 DIAGNOSIS — I10 ESSENTIAL HYPERTENSION: ICD-10-CM

## 2019-05-15 DIAGNOSIS — E66.01 MORBID OBESITY WITH BMI OF 40.0-44.9, ADULT: ICD-10-CM

## 2019-05-15 DIAGNOSIS — R07.89 CHEST DISCOMFORT: Primary | ICD-10-CM

## 2019-05-15 DIAGNOSIS — L03.116 CELLULITIS OF LEFT LOWER EXTREMITY: Primary | ICD-10-CM

## 2019-05-15 PROCEDURE — 99999 PR PBB SHADOW E&M-EST. PATIENT-LVL III: ICD-10-PCS | Mod: PBBFAC,,, | Performed by: FAMILY MEDICINE

## 2019-05-15 PROCEDURE — 3008F BODY MASS INDEX DOCD: CPT | Mod: CPTII,S$GLB,, | Performed by: INTERNAL MEDICINE

## 2019-05-15 PROCEDURE — 3078F DIAST BP <80 MM HG: CPT | Mod: CPTII,S$GLB,, | Performed by: FAMILY MEDICINE

## 2019-05-15 PROCEDURE — 99999 PR PBB SHADOW E&M-EST. PATIENT-LVL III: CPT | Mod: PBBFAC,,, | Performed by: INTERNAL MEDICINE

## 2019-05-15 PROCEDURE — 99214 OFFICE O/P EST MOD 30 MIN: CPT | Mod: S$GLB,,, | Performed by: INTERNAL MEDICINE

## 2019-05-15 PROCEDURE — 3074F SYST BP LT 130 MM HG: CPT | Mod: CPTII,S$GLB,, | Performed by: INTERNAL MEDICINE

## 2019-05-15 PROCEDURE — 3074F SYST BP LT 130 MM HG: CPT | Mod: CPTII,S$GLB,, | Performed by: FAMILY MEDICINE

## 2019-05-15 PROCEDURE — 99214 PR OFFICE/OUTPT VISIT, EST, LEVL IV, 30-39 MIN: ICD-10-PCS | Mod: S$GLB,,, | Performed by: FAMILY MEDICINE

## 2019-05-15 PROCEDURE — 3008F PR BODY MASS INDEX (BMI) DOCUMENTED: ICD-10-PCS | Mod: CPTII,S$GLB,, | Performed by: INTERNAL MEDICINE

## 2019-05-15 PROCEDURE — 3074F PR MOST RECENT SYSTOLIC BLOOD PRESSURE < 130 MM HG: ICD-10-PCS | Mod: CPTII,S$GLB,, | Performed by: INTERNAL MEDICINE

## 2019-05-15 PROCEDURE — 99214 PR OFFICE/OUTPT VISIT, EST, LEVL IV, 30-39 MIN: ICD-10-PCS | Mod: S$GLB,,, | Performed by: INTERNAL MEDICINE

## 2019-05-15 PROCEDURE — 3008F PR BODY MASS INDEX (BMI) DOCUMENTED: ICD-10-PCS | Mod: CPTII,S$GLB,, | Performed by: FAMILY MEDICINE

## 2019-05-15 PROCEDURE — 3078F PR MOST RECENT DIASTOLIC BLOOD PRESSURE < 80 MM HG: ICD-10-PCS | Mod: CPTII,S$GLB,, | Performed by: INTERNAL MEDICINE

## 2019-05-15 PROCEDURE — 99214 OFFICE O/P EST MOD 30 MIN: CPT | Mod: S$GLB,,, | Performed by: FAMILY MEDICINE

## 2019-05-15 PROCEDURE — 3008F BODY MASS INDEX DOCD: CPT | Mod: CPTII,S$GLB,, | Performed by: FAMILY MEDICINE

## 2019-05-15 PROCEDURE — 99999 PR PBB SHADOW E&M-EST. PATIENT-LVL III: ICD-10-PCS | Mod: PBBFAC,,, | Performed by: INTERNAL MEDICINE

## 2019-05-15 PROCEDURE — 3074F PR MOST RECENT SYSTOLIC BLOOD PRESSURE < 130 MM HG: ICD-10-PCS | Mod: CPTII,S$GLB,, | Performed by: FAMILY MEDICINE

## 2019-05-15 PROCEDURE — 3078F PR MOST RECENT DIASTOLIC BLOOD PRESSURE < 80 MM HG: ICD-10-PCS | Mod: CPTII,S$GLB,, | Performed by: FAMILY MEDICINE

## 2019-05-15 PROCEDURE — 99999 PR PBB SHADOW E&M-EST. PATIENT-LVL III: CPT | Mod: PBBFAC,,, | Performed by: FAMILY MEDICINE

## 2019-05-15 PROCEDURE — 3078F DIAST BP <80 MM HG: CPT | Mod: CPTII,S$GLB,, | Performed by: INTERNAL MEDICINE

## 2019-05-15 RX ORDER — CLINDAMYCIN HYDROCHLORIDE 300 MG/1
300 CAPSULE ORAL EVERY 8 HOURS
Qty: 9 CAPSULE | Refills: 0 | Status: SHIPPED | OUTPATIENT
Start: 2019-05-15 | End: 2019-07-02

## 2019-05-15 RX ORDER — ATORVASTATIN CALCIUM 20 MG/1
20 TABLET, FILM COATED ORAL DAILY
Qty: 90 TABLET | Refills: 1 | Status: SHIPPED | OUTPATIENT
Start: 2019-05-15 | End: 2019-09-30 | Stop reason: SDUPTHER

## 2019-05-15 NOTE — PROGRESS NOTES
Subjective:   Patient ID:  Concepcion Don is a 63 y.o. female who presents for cardiac consult of Hypertension (f/u results) and Hyperlipidemia      Hypertension   Pertinent negatives include no chest pain.   Hyperlipidemia   Pertinent negatives include no chest pain.     The patient came in today for cardiac consult of Hypertension (f/u results) and Hyperlipidemia    Referring Physician: Christiano Mays MD   Reason for initial consult: CP, SOB, elevated D dimer       Concepcion Don is a 63 y.o. female pt with current medical conditions HTN, HLD, obesity, hypothyroidism presents for follow up CV evaluation of CP/SOB.     4/10/19  Pt had recent labwork with elevated D dimer. BNP, Trop negative. . She feels chest pain, center of chest. Pain initially started after picking up grandson. Pain has not gone away. No pleuritic type pain. Takes Tylenol for pain occasionally. Does have leg swelling, worse with sitting down. NO palpitations. Has mild sinus infection, started Mucinex.     5/15/19  CTA neg for PE, LE u/s neg for DVT. 2D ECHO overall normal Bi V function with mild valve dysfunction. No further CP since last visit, has been picking up grandson without issues. No SOB.  Overall feels well, discussed stress test if CP recurrent.     Patient feels  no PND, no palpitation, no dizziness, no syncope, no CNS symptoms.    Patient is compliant with medications.    CTA 4/10 chest  Impression       1. No evidence to suggest pulmonary embolism.    2.  Mild mosaic attenuation in the lung bases which may be secondary to hypoaeration or air trapping.       2D ECHO  CONCLUSIONS     1 - Normal left ventricular systolic function (EF 60-65%).     2 - Normal left ventricular diastolic function.     3 - Normal right ventricular systolic function .     4 - The estimated PA systolic pressure is 25 mmHg.     5 - No wall motion abnormalities.     6 - Mild mitral regurgitation.     7 - Mild tricuspid regurgitation.     8 - Trivial  pulmonic regurgitation.       This document has been electronically    SIGNED BY: Dean Weeks MD On: 05/10/2019 13:43    LE U/S  TEST DESCRIPTION   Impression:  RIGHT:  No evidence of Right lower extremity DVT.    LEFT:  No evidence of Left lower extremity DVT.      This document has been electronically    SIGNED BY: Dean Weeks MD On: 05/10/2019 15:42    Past Medical History:   Diagnosis Date    Bronchitis 2018    Cellulitis 2018    Class 3 obesity due to excess calories with serious comorbidity in adult 10/31/2017    Hordeolum externum of right upper eyelid 2018    Hyperlipidemia     Hypertension     Nasal sore 2018    Thyroid disease        Past Surgical History:   Procedure Laterality Date     SECTION, CLASSIC      HYSTERECTOMY      KNEE SURGERY      repleacement right knee    ROTATOR CUFF REPAIR Right        Social History     Tobacco Use    Smoking status: Never Smoker    Smokeless tobacco: Never Used   Substance Use Topics    Alcohol use: Yes     Comment: occasional    Drug use: No       Family History   Problem Relation Age of Onset    Kidney disease Mother     Hypertension Mother     Diabetes Mother     Hypertension Father     Stroke Father        Patient's Medications   New Prescriptions    No medications on file   Previous Medications    ACETAMINOPHEN (TYLENOL) 500 MG TABLET    Take 1 tablet (500 mg total) by mouth every 6 (six) hours as needed for Pain.    ASPIRIN (ECOTRIN) 81 MG EC TABLET    Take 81 mg by mouth once daily.    ATORVASTATIN (LIPITOR) 20 MG TABLET    Take 1 tablet (20 mg total) by mouth once daily.    B COMPLEX VITAMINS TABLET    Take 1 tablet by mouth once daily.    BENAZEPRIL-HYDROCHLORTHIAZIDE (LOTENSIN HCT) 20-12.5 MG PER TABLET    Take 1 tablet by mouth once daily.    CLINDAMYCIN (CLEOCIN) 300 MG CAPSULE    Take 1 capsule (300 mg total) by mouth every 8 (eight) hours.    DOCUSATE CALCIUM (STOOL SOFTENER ORAL)    Take 1 tablet by  "mouth once daily.     GLUCOSAMINE SULFATE (GLUCOSAMINE ORAL)    Take 1 tablet by mouth once daily.     LEVOTHYROXINE (SYNTHROID) 25 MCG TABLET    Take 1 tablet (25 mcg total) by mouth once daily.    MELOXICAM (MOBIC) 7.5 MG TABLET    Take 7.5 mg by mouth 2 (two) times daily.    MULTIVITAMIN CAPSULE    Take 1 capsule by mouth once daily.    OMEGA 3-DHA-EPA-FISH -500-1,000 MG CAP    Take 1 capsule by mouth once daily.     PSYLLIUM SEED, WITH DEXTROSE, (FIBER ORAL)    Take by mouth once daily.    TRIAMCINOLONE ACETONIDE 0.5% (KENALOG) 0.5 % CREA    Apply topically 2 (two) times daily.   Modified Medications    No medications on file   Discontinued Medications    CALCIUM CARBONATE (OS-JENNIFER) 500 MG CALCIUM (1,250 MG) TABLET    Take 2 tablets (1,000 mg total) by mouth once daily.       Review of Systems   Constitutional: Negative.    HENT: Negative.    Eyes: Negative.    Respiratory: Negative.    Cardiovascular: Negative for chest pain and leg swelling.   Gastrointestinal: Negative.    Genitourinary: Negative.    Musculoskeletal: Negative.    Skin: Negative.    Neurological: Negative.    Endo/Heme/Allergies: Negative.    Psychiatric/Behavioral: Negative.    All 12 systems otherwise negative.      Wt Readings from Last 3 Encounters:   05/15/19 118 kg (260 lb 2.3 oz)   05/13/19 120.7 kg (266 lb 1.5 oz)   04/10/19 119.4 kg (263 lb 3.7 oz)     Temp Readings from Last 3 Encounters:   05/13/19 96.9 °F (36.1 °C) (Tympanic)   04/09/19 96.9 °F (36.1 °C) (Tympanic)   01/14/19 97.3 °F (36.3 °C) (Tympanic)     BP Readings from Last 3 Encounters:   05/15/19 100/70   05/13/19 124/68   04/10/19 120/80     Pulse Readings from Last 3 Encounters:   05/15/19 88   05/13/19 75   04/10/19 76       /70 (BP Method: Large (Manual))   Pulse 88   Ht 5' 6" (1.676 m)   Wt 118 kg (260 lb 2.3 oz)   BMI 41.99 kg/m²     Objective:   Physical Exam   Constitutional: She is oriented to person, place, and time. She appears well-developed and " well-nourished. No distress.   HENT:   Head: Normocephalic and atraumatic.   Nose: Nose normal.   Mouth/Throat: Oropharynx is clear and moist.   Eyes: Conjunctivae and EOM are normal. No scleral icterus.   Neck: Normal range of motion. Neck supple. No JVD present. No thyromegaly present.   Cardiovascular: Normal rate, regular rhythm, S1 normal and S2 normal. Exam reveals no gallop, no S3, no S4 and no friction rub.   No murmur heard.  Pulmonary/Chest: Effort normal and breath sounds normal. No stridor. No respiratory distress. She has no wheezes. She has no rales. She exhibits no tenderness.   Abdominal: Soft. Bowel sounds are normal. She exhibits no distension and no mass. There is no tenderness. There is no rebound.   Genitourinary:   Genitourinary Comments: Deferred   Musculoskeletal: Normal range of motion. She exhibits no edema, tenderness or deformity.   Lymphadenopathy:     She has no cervical adenopathy.   Neurological: She is alert and oriented to person, place, and time. She exhibits normal muscle tone. Coordination normal.   Skin: Skin is warm and dry. No rash noted. She is not diaphoretic. No erythema. No pallor.   Psychiatric: She has a normal mood and affect. Her behavior is normal. Judgment and thought content normal.   Nursing note and vitals reviewed.      Lab Results   Component Value Date     04/09/2019    K 4.0 04/09/2019     04/09/2019    CO2 29 04/09/2019    BUN 14 04/09/2019    CREATININE 0.8 04/09/2019    GLU 87 04/09/2019    MG 1.9 06/25/2009    AST 22 04/09/2019    ALT 18 04/09/2019    ALBUMIN 3.6 04/09/2019    PROT 8.6 (H) 04/09/2019    BILITOT 0.6 04/09/2019    WBC 6.97 04/09/2019    HGB 12.5 04/09/2019    HCT 39.8 04/09/2019    MCV 89 04/09/2019     04/09/2019    TSH 1.619 04/09/2019    CHOL 124 04/09/2019    HDL 45 04/09/2019    LDLCALC 65.4 04/09/2019    TRIG 68 04/09/2019    BNP 40 04/09/2019     Assessment:      1. Chest discomfort    2. Essential hypertension     3. Other hyperlipidemia    4. Localized edema    5. Morbid obesity with BMI of 40.0-44.9, adult        Plan:   1. Chest pain - resolved  - CT PE - negative  - 2D ECHO - overall normal, mild valve disease  - LE u/s r/o DVT - negative  - stress test if chest pain recurrent    2. HTN  - cont meds  - edema resolved with HCTZ    3. HLD  - cont statin    4. Obesity  - discussed importance of weight loss through diet and exercise  - emphasized Mediterranean diet, more details in AVS    5. Hypothyroidism  - cont meds per PCP    RTC PRN    Thank you for allowing me to participate in this patient's care. Please do not hesitate to contact me with any questions or concerns. Consult note has been forwarded to the referral physician.

## 2019-05-15 NOTE — PROGRESS NOTES
Subjective:       Patient ID: Concepcion Don is a 63 y.o. female.    Chief Complaint: Follow-up (3 day)    Cellulitis f/u:    O: 4 d ago  L: Left knee   D:  C:  Martin: abx  Exac: palpation, movement.      Review of Systems   Respiratory: Negative for shortness of breath.    Cardiovascular: Negative for chest pain.   Gastrointestinal: Negative for abdominal pain.   Skin: Positive for color change and rash.       Objective:      Physical Exam   Constitutional: She appears well-developed and well-nourished. No distress.   HENT:   Head: Normocephalic and atraumatic.   Pulmonary/Chest: Effort normal and breath sounds normal. No respiratory distress. She has no wheezes.   Abdominal: Soft. She exhibits no distension. There is no tenderness. There is no guarding.   Skin: Skin is warm and dry. Rash noted. She is not diaphoretic. There is erythema.   Redness to left knee, improved, with redness not increasing past prior margins.  Mild ttp   Nursing note and vitals reviewed.      Assessment:       1. Cellulitis of left lower extremity    2. Essential hypertension    3. Cellulitis of other specified site    4. Other hyperlipidemia        Plan:     Problem List Items Addressed This Visit        Cardiac/Vascular    Hypertension    Other hyperlipidemia    Relevant Medications    atorvastatin (LIPITOR) 20 MG tablet       ID    Cellulitis - Primary    Current Assessment & Plan     Increase therapy to a total of 10 days.         Relevant Medications    clindamycin (CLEOCIN) 300 MG capsule

## 2019-07-02 ENCOUNTER — OFFICE VISIT (OUTPATIENT)
Dept: INTERNAL MEDICINE | Facility: CLINIC | Age: 64
End: 2019-07-02
Payer: COMMERCIAL

## 2019-07-02 ENCOUNTER — HOSPITAL ENCOUNTER (OUTPATIENT)
Dept: RADIOLOGY | Facility: HOSPITAL | Age: 64
Discharge: HOME | End: 2019-07-02
Attending: FAMILY MEDICINE
Payer: COMMERCIAL

## 2019-07-02 VITALS
TEMPERATURE: 98 F | RESPIRATION RATE: 19 BRPM | HEIGHT: 66 IN | BODY MASS INDEX: 43.58 KG/M2 | HEART RATE: 70 BPM | DIASTOLIC BLOOD PRESSURE: 75 MMHG | WEIGHT: 271.19 LBS | OXYGEN SATURATION: 98 % | SYSTOLIC BLOOD PRESSURE: 138 MMHG

## 2019-07-02 DIAGNOSIS — M77.32 CALCANEAL SPUR OF LEFT FOOT: ICD-10-CM

## 2019-07-02 DIAGNOSIS — M77.32 CALCANEAL SPUR OF LEFT FOOT: Primary | ICD-10-CM

## 2019-07-02 PROBLEM — L03.90 CELLULITIS: Status: RESOLVED | Noted: 2018-09-21 | Resolved: 2019-07-02

## 2019-07-02 PROBLEM — R07.9 CHEST PAIN: Status: RESOLVED | Noted: 2019-04-13 | Resolved: 2019-07-02

## 2019-07-02 PROBLEM — L03.116 CELLULITIS OF LEFT LOWER EXTREMITY: Status: RESOLVED | Noted: 2019-05-13 | Resolved: 2019-07-02

## 2019-07-02 PROBLEM — R07.89 CHEST DISCOMFORT: Status: RESOLVED | Noted: 2019-04-09 | Resolved: 2019-07-02

## 2019-07-02 PROCEDURE — 99999 PR PBB SHADOW E&M-EST. PATIENT-LVL III: ICD-10-PCS | Mod: PBBFAC,,, | Performed by: FAMILY MEDICINE

## 2019-07-02 PROCEDURE — 73630 X-RAY EXAM OF FOOT: CPT | Mod: TC,PO,LT

## 2019-07-02 PROCEDURE — 3008F BODY MASS INDEX DOCD: CPT | Mod: CPTII,S$GLB,, | Performed by: FAMILY MEDICINE

## 2019-07-02 PROCEDURE — 73630 XR FOOT COMPLETE 3 VIEW LEFT: ICD-10-PCS | Mod: 26,LT,, | Performed by: RADIOLOGY

## 2019-07-02 PROCEDURE — 99999 PR PBB SHADOW E&M-EST. PATIENT-LVL III: CPT | Mod: PBBFAC,,, | Performed by: FAMILY MEDICINE

## 2019-07-02 PROCEDURE — 3075F PR MOST RECENT SYSTOLIC BLOOD PRESS GE 130-139MM HG: ICD-10-PCS | Mod: CPTII,S$GLB,, | Performed by: FAMILY MEDICINE

## 2019-07-02 PROCEDURE — 99214 PR OFFICE/OUTPT VISIT, EST, LEVL IV, 30-39 MIN: ICD-10-PCS | Mod: S$GLB,,, | Performed by: FAMILY MEDICINE

## 2019-07-02 PROCEDURE — 3075F SYST BP GE 130 - 139MM HG: CPT | Mod: CPTII,S$GLB,, | Performed by: FAMILY MEDICINE

## 2019-07-02 PROCEDURE — 3078F DIAST BP <80 MM HG: CPT | Mod: CPTII,S$GLB,, | Performed by: FAMILY MEDICINE

## 2019-07-02 PROCEDURE — 3008F PR BODY MASS INDEX (BMI) DOCUMENTED: ICD-10-PCS | Mod: CPTII,S$GLB,, | Performed by: FAMILY MEDICINE

## 2019-07-02 PROCEDURE — 73630 X-RAY EXAM OF FOOT: CPT | Mod: 26,LT,, | Performed by: RADIOLOGY

## 2019-07-02 PROCEDURE — 99214 OFFICE O/P EST MOD 30 MIN: CPT | Mod: S$GLB,,, | Performed by: FAMILY MEDICINE

## 2019-07-02 PROCEDURE — 3078F PR MOST RECENT DIASTOLIC BLOOD PRESSURE < 80 MM HG: ICD-10-PCS | Mod: CPTII,S$GLB,, | Performed by: FAMILY MEDICINE

## 2019-07-02 RX ORDER — MELOXICAM 15 MG/1
15 TABLET ORAL DAILY
Qty: 30 TABLET | Refills: 0 | Status: SHIPPED | OUTPATIENT
Start: 2019-07-02 | End: 2019-07-16 | Stop reason: SDUPTHER

## 2019-07-02 NOTE — PROGRESS NOTES
Please call pt with abnormal results. Pt does not need appt at this time, unless they have questions or wish to further discuss.  Keep appt with Podiatry

## 2019-07-02 NOTE — PROGRESS NOTES
Subjective:       Patient ID: Concepcion Don is a 64 y.o. female.    Chief Complaint: Heel Pain    Foot Injury    Incident onset: 3 weeks. There was no injury mechanism. Pain location: left heel. The quality of the pain is described as aching. The pain is moderate. The pain has been constant since onset. Pertinent negatives include no inability to bear weight, loss of motion, loss of sensation, muscle weakness, numbness or tingling. She reports no foreign bodies present. Nothing aggravates the symptoms. Treatments tried: stretches.     Review of Systems   Respiratory: Negative for shortness of breath.    Cardiovascular: Negative for chest pain.   Gastrointestinal: Negative for abdominal pain.   Musculoskeletal: Negative for arthralgias, gait problem and joint swelling.   Neurological: Negative for tingling and numbness.       Objective:      Physical Exam   Constitutional: She appears well-developed and well-nourished. No distress.   HENT:   Head: Normocephalic and atraumatic.   Pulmonary/Chest: Effort normal and breath sounds normal. No respiratory distress. She has no wheezes.   Musculoskeletal: Normal range of motion. She exhibits tenderness. She exhibits no edema or deformity.   TTP at base of left heel.   Skin: Skin is warm and dry. No rash noted. She is not diaphoretic. No erythema.   Nursing note and vitals reviewed.      Assessment:       1. Calcaneal spur of left foot        Plan:     Problem List Items Addressed This Visit        Orthopedic    Calcaneal spur of left foot - Primary    Relevant Medications    meloxicam (MOBIC) 15 MG tablet    Other Relevant Orders    Ambulatory Referral to Podiatry    X-Ray Foot 2 View Left

## 2019-07-16 ENCOUNTER — LAB VISIT (OUTPATIENT)
Dept: LAB | Facility: HOSPITAL | Age: 64
End: 2019-07-16
Attending: FAMILY MEDICINE
Payer: COMMERCIAL

## 2019-07-16 ENCOUNTER — OFFICE VISIT (OUTPATIENT)
Dept: INTERNAL MEDICINE | Facility: CLINIC | Age: 64
End: 2019-07-16
Payer: COMMERCIAL

## 2019-07-16 VITALS
RESPIRATION RATE: 20 BRPM | SYSTOLIC BLOOD PRESSURE: 102 MMHG | BODY MASS INDEX: 43.22 KG/M2 | OXYGEN SATURATION: 98 % | DIASTOLIC BLOOD PRESSURE: 66 MMHG | TEMPERATURE: 97 F | WEIGHT: 268.94 LBS | HEART RATE: 72 BPM | HEIGHT: 66 IN

## 2019-07-16 DIAGNOSIS — I10 ESSENTIAL HYPERTENSION: ICD-10-CM

## 2019-07-16 DIAGNOSIS — Z00.00 ROUTINE GENERAL MEDICAL EXAMINATION AT A HEALTH CARE FACILITY: ICD-10-CM

## 2019-07-16 DIAGNOSIS — M77.32 CALCANEAL SPUR OF LEFT FOOT: ICD-10-CM

## 2019-07-16 DIAGNOSIS — Z00.00 ROUTINE GENERAL MEDICAL EXAMINATION AT A HEALTH CARE FACILITY: Primary | ICD-10-CM

## 2019-07-16 DIAGNOSIS — E07.9 THYROID DISEASE: ICD-10-CM

## 2019-07-16 LAB
25(OH)D3+25(OH)D2 SERPL-MCNC: 39 NG/ML (ref 30–96)
ALBUMIN SERPL BCP-MCNC: 3.6 G/DL (ref 3.5–5.2)
ALP SERPL-CCNC: 114 U/L (ref 55–135)
ALT SERPL W/O P-5'-P-CCNC: 20 U/L (ref 10–44)
ANION GAP SERPL CALC-SCNC: 8 MMOL/L (ref 8–16)
AST SERPL-CCNC: 22 U/L (ref 10–40)
BASOPHILS # BLD AUTO: 0.02 K/UL (ref 0–0.2)
BASOPHILS NFR BLD: 0.3 % (ref 0–1.9)
BILIRUB SERPL-MCNC: 0.7 MG/DL (ref 0.1–1)
BUN SERPL-MCNC: 18 MG/DL (ref 8–23)
CALCIUM SERPL-MCNC: 9 MG/DL (ref 8.7–10.5)
CHLORIDE SERPL-SCNC: 105 MMOL/L (ref 95–110)
CHOLEST SERPL-MCNC: 130 MG/DL (ref 120–199)
CHOLEST/HDLC SERPL: 2.9 {RATIO} (ref 2–5)
CO2 SERPL-SCNC: 26 MMOL/L (ref 23–29)
CREAT SERPL-MCNC: 0.9 MG/DL (ref 0.5–1.4)
DIFFERENTIAL METHOD: ABNORMAL
EOSINOPHIL # BLD AUTO: 0.2 K/UL (ref 0–0.5)
EOSINOPHIL NFR BLD: 3.6 % (ref 0–8)
ERYTHROCYTE [DISTWIDTH] IN BLOOD BY AUTOMATED COUNT: 14.4 % (ref 11.5–14.5)
EST. GFR  (AFRICAN AMERICAN): >60 ML/MIN/1.73 M^2
EST. GFR  (NON AFRICAN AMERICAN): >60 ML/MIN/1.73 M^2
ESTIMATED AVG GLUCOSE: 103 MG/DL (ref 68–131)
GLUCOSE SERPL-MCNC: 101 MG/DL (ref 70–110)
HBA1C MFR BLD HPLC: 5.2 % (ref 4–5.6)
HCT VFR BLD AUTO: 38.7 % (ref 37–48.5)
HDLC SERPL-MCNC: 45 MG/DL (ref 40–75)
HDLC SERPL: 34.6 % (ref 20–50)
HGB BLD-MCNC: 12.3 G/DL (ref 12–16)
LDLC SERPL CALC-MCNC: 73.6 MG/DL (ref 63–159)
LYMPHOCYTES # BLD AUTO: 2.5 K/UL (ref 1–4.8)
LYMPHOCYTES NFR BLD: 38.1 % (ref 18–48)
MCH RBC QN AUTO: 28.7 PG (ref 27–31)
MCHC RBC AUTO-ENTMCNC: 31.8 G/DL (ref 32–36)
MCV RBC AUTO: 90 FL (ref 82–98)
MONOCYTES # BLD AUTO: 0.6 K/UL (ref 0.3–1)
MONOCYTES NFR BLD: 8.9 % (ref 4–15)
NEUTROPHILS # BLD AUTO: 3.3 K/UL (ref 1.8–7.7)
NEUTROPHILS NFR BLD: 49.1 % (ref 38–73)
NONHDLC SERPL-MCNC: 85 MG/DL
PLATELET # BLD AUTO: 202 K/UL (ref 150–350)
PMV BLD AUTO: 10.1 FL (ref 9.2–12.9)
POTASSIUM SERPL-SCNC: 3.9 MMOL/L (ref 3.5–5.1)
PROT SERPL-MCNC: 8.3 G/DL (ref 6–8.4)
RBC # BLD AUTO: 4.28 M/UL (ref 4–5.4)
SODIUM SERPL-SCNC: 139 MMOL/L (ref 136–145)
TRIGL SERPL-MCNC: 57 MG/DL (ref 30–150)
TSH SERPL DL<=0.005 MIU/L-ACNC: 1.39 UIU/ML (ref 0.4–4)
WBC # BLD AUTO: 6.66 K/UL (ref 3.9–12.7)

## 2019-07-16 PROCEDURE — 3078F DIAST BP <80 MM HG: CPT | Mod: CPTII,S$GLB,, | Performed by: FAMILY MEDICINE

## 2019-07-16 PROCEDURE — 3074F PR MOST RECENT SYSTOLIC BLOOD PRESSURE < 130 MM HG: ICD-10-PCS | Mod: CPTII,S$GLB,, | Performed by: FAMILY MEDICINE

## 2019-07-16 PROCEDURE — 36415 COLL VENOUS BLD VENIPUNCTURE: CPT | Mod: PO

## 2019-07-16 PROCEDURE — 80053 COMPREHEN METABOLIC PANEL: CPT | Mod: PO

## 2019-07-16 PROCEDURE — 80061 LIPID PANEL: CPT

## 2019-07-16 PROCEDURE — 99396 PREV VISIT EST AGE 40-64: CPT | Mod: S$GLB,,, | Performed by: FAMILY MEDICINE

## 2019-07-16 PROCEDURE — 83036 HEMOGLOBIN GLYCOSYLATED A1C: CPT

## 2019-07-16 PROCEDURE — 99999 PR PBB SHADOW E&M-EST. PATIENT-LVL III: CPT | Mod: PBBFAC,,, | Performed by: FAMILY MEDICINE

## 2019-07-16 PROCEDURE — 3074F SYST BP LT 130 MM HG: CPT | Mod: CPTII,S$GLB,, | Performed by: FAMILY MEDICINE

## 2019-07-16 PROCEDURE — 84443 ASSAY THYROID STIM HORMONE: CPT | Mod: PO

## 2019-07-16 PROCEDURE — 99999 PR PBB SHADOW E&M-EST. PATIENT-LVL III: ICD-10-PCS | Mod: PBBFAC,,, | Performed by: FAMILY MEDICINE

## 2019-07-16 PROCEDURE — 3078F PR MOST RECENT DIASTOLIC BLOOD PRESSURE < 80 MM HG: ICD-10-PCS | Mod: CPTII,S$GLB,, | Performed by: FAMILY MEDICINE

## 2019-07-16 PROCEDURE — 85025 COMPLETE CBC W/AUTO DIFF WBC: CPT | Mod: PO

## 2019-07-16 PROCEDURE — 82306 VITAMIN D 25 HYDROXY: CPT

## 2019-07-16 PROCEDURE — 99396 PR PREVENTIVE VISIT,EST,40-64: ICD-10-PCS | Mod: S$GLB,,, | Performed by: FAMILY MEDICINE

## 2019-07-16 RX ORDER — BENAZEPRIL HYDROCHLORIDE AND HYDROCHLOROTHIAZIDE 20; 12.5 MG/1; MG/1
1 TABLET ORAL DAILY
Qty: 90 TABLET | Refills: 1 | Status: SHIPPED | OUTPATIENT
Start: 2019-07-16 | End: 2019-11-04 | Stop reason: SDUPTHER

## 2019-07-16 RX ORDER — MELOXICAM 15 MG/1
15 TABLET ORAL DAILY
Qty: 30 TABLET | Refills: 0 | Status: SHIPPED | OUTPATIENT
Start: 2019-07-16 | End: 2019-08-15

## 2019-07-16 RX ORDER — LEVOTHYROXINE SODIUM 25 UG/1
25 TABLET ORAL DAILY
Qty: 90 TABLET | Refills: 1 | Status: SHIPPED | OUTPATIENT
Start: 2019-07-16 | End: 2020-01-16

## 2019-07-16 NOTE — PROGRESS NOTES
Subjective:       Patient ID: Concepcion Don is a 64 y.o. female.    Chief Complaint: Annual Exam    Subjective:     Concepcion Don is a 64 y.o. female and is here for a comprehensive physical exam. The patient reports no problems.    Do you take any herbs or supplements that were not prescribed by a doctor? Multivit, b12, vit c.  Are you taking calcium supplements? no  Are you taking aspirin daily? yes     History:  Any STD's in the past? none    The following portions of the patient's history were reviewed and updated as appropriate: allergies, current medications, past family history, past medical history, past social history, past surgical history and problem list.    Review of Systems  Do you have pain that bothers you in your daily life? no  Pertinent items are noted in HPI.    Problem List Items Addressed This Visit        Cardiac/Vascular    Hypertension    Relevant Medications    benazepril-hydrochlorthiazide (LOTENSIN HCT) 20-12.5 mg per tablet       Endocrine    Thyroid disease    Relevant Medications    levothyroxine (SYNTHROID) 25 MCG tablet       Orthopedic    Calcaneal spur of left foot    Relevant Medications    meloxicam (MOBIC) 15 MG tablet       Other    Routine general medical examination at a health care facility - Primary    Relevant Orders    TSH    Lipid panel    Comprehensive metabolic panel    CBC auto differential    Hemoglobin A1c      2. Patient Counseling:  --Nutrition: Stressed importance of moderation in sodium/caffeine intake, saturated fat and cholesterol, caloric balance, sufficient intake of fresh fruits, vegetables, fiber, calcium, iron, and 1 mg of folate supplement per day (for females capable of pregnancy).  --Discussed the issue of estrogen replacement, calcium supplement, and the daily use of baby aspirin.  --Exercise: Stressed the importance of regular exercise.   --Substance Abuse: Discussed cessation/primary prevention of tobacco, alcohol, or other drug use; driving or  other dangerous activities under the influence; availability of treatment for abuse.    --Sexuality: Discussed sexually transmitted diseases, partner selection, use of condoms, avoidance of unintended pregnancy  and contraceptive alternatives.   --Injury prevention: Discussed safety belts, safety helmets, smoke detector, smoking near bedding or upholstery.   --Dental health: Discussed importance of regular tooth brushing, flossing, and dental visits.  --Immunizations reviewed.  --Discussed benefits of screening colonoscopy.  --After hours service discussed with patient    3. Discussed the patient's BMI with her.  The BMI .  4. Follow up as needed for acute illness      Review of Systems   Constitutional: Negative for activity change and unexpected weight change.   HENT: Negative for hearing loss, rhinorrhea and trouble swallowing.    Eyes: Negative for discharge and visual disturbance.   Respiratory: Negative for chest tightness and wheezing.    Cardiovascular: Negative for chest pain and palpitations.   Gastrointestinal: Negative for blood in stool, constipation, diarrhea and vomiting.   Endocrine: Negative for polydipsia and polyuria.   Genitourinary: Negative for difficulty urinating, dysuria, hematuria and menstrual problem.   Musculoskeletal: Positive for arthralgias. Negative for joint swelling and neck pain.   Neurological: Negative for weakness and headaches.   Psychiatric/Behavioral: Negative for confusion and dysphoric mood.       Objective:      Physical Exam   Constitutional: She appears well-developed and well-nourished. No distress.   HENT:   Head: Normocephalic and atraumatic.   Eyes: Conjunctivae are normal. No scleral icterus.   Cardiovascular: Normal rate and regular rhythm.   Pulmonary/Chest: Effort normal and breath sounds normal. No respiratory distress. She has no wheezes.   Abdominal: Soft. Bowel sounds are normal. There is no tenderness. There is no guarding.   Neurological: She is alert.    Skin: Skin is warm. No rash noted. She is not diaphoretic. No erythema. No pallor.   Good turgor   Nursing note and vitals reviewed.      Assessment:       1. Routine general medical examination at a health care facility    2. Calcaneal spur of left foot    3. Essential hypertension    4. Thyroid disease        Plan:     Problem List Items Addressed This Visit        Cardiac/Vascular    Hypertension    Relevant Medications    benazepril-hydrochlorthiazide (LOTENSIN HCT) 20-12.5 mg per tablet       Endocrine    Thyroid disease    Relevant Medications    levothyroxine (SYNTHROID) 25 MCG tablet       Orthopedic    Calcaneal spur of left foot    Relevant Medications    meloxicam (MOBIC) 15 MG tablet       Other    Routine general medical examination at a health care facility - Primary    Relevant Orders    TSH    Lipid panel    Comprehensive metabolic panel    CBC auto differential    Hemoglobin A1c    Vitamin D

## 2019-07-18 ENCOUNTER — TELEPHONE (OUTPATIENT)
Dept: INTERNAL MEDICINE | Facility: CLINIC | Age: 64
End: 2019-07-18

## 2019-07-18 NOTE — TELEPHONE ENCOUNTER
----- Message from Sangita Gerber sent at 7/18/2019 10:32 AM CDT -----  Contact: pt   Type:  Patient Returning Call    Who Called:pt   Who Left Message for Patient: Jodi   Does the patient know what this is regarding?:yes   Would the patient rather a call back or a response via [facility]? Call back   Best Call Back Number: [phone] (home)   Additional Information:

## 2019-07-31 ENCOUNTER — OFFICE VISIT (OUTPATIENT)
Dept: INTERNAL MEDICINE | Facility: CLINIC | Age: 64
End: 2019-07-31
Payer: COMMERCIAL

## 2019-07-31 VITALS
SYSTOLIC BLOOD PRESSURE: 121 MMHG | RESPIRATION RATE: 16 BRPM | TEMPERATURE: 97 F | WEIGHT: 268.94 LBS | OXYGEN SATURATION: 96 % | HEART RATE: 85 BPM | DIASTOLIC BLOOD PRESSURE: 62 MMHG | BODY MASS INDEX: 43.22 KG/M2 | HEIGHT: 66 IN

## 2019-07-31 DIAGNOSIS — L03.90 CELLULITIS, UNSPECIFIED CELLULITIS SITE: Primary | ICD-10-CM

## 2019-07-31 PROCEDURE — 3078F PR MOST RECENT DIASTOLIC BLOOD PRESSURE < 80 MM HG: ICD-10-PCS | Mod: CPTII,S$GLB,, | Performed by: FAMILY MEDICINE

## 2019-07-31 PROCEDURE — 3008F BODY MASS INDEX DOCD: CPT | Mod: CPTII,S$GLB,, | Performed by: FAMILY MEDICINE

## 2019-07-31 PROCEDURE — 99214 PR OFFICE/OUTPT VISIT, EST, LEVL IV, 30-39 MIN: ICD-10-PCS | Mod: S$GLB,,, | Performed by: FAMILY MEDICINE

## 2019-07-31 PROCEDURE — 3074F PR MOST RECENT SYSTOLIC BLOOD PRESSURE < 130 MM HG: ICD-10-PCS | Mod: CPTII,S$GLB,, | Performed by: FAMILY MEDICINE

## 2019-07-31 PROCEDURE — 99999 PR PBB SHADOW E&M-EST. PATIENT-LVL III: CPT | Mod: PBBFAC,,, | Performed by: FAMILY MEDICINE

## 2019-07-31 PROCEDURE — 3008F PR BODY MASS INDEX (BMI) DOCUMENTED: ICD-10-PCS | Mod: CPTII,S$GLB,, | Performed by: FAMILY MEDICINE

## 2019-07-31 PROCEDURE — 99999 PR PBB SHADOW E&M-EST. PATIENT-LVL III: ICD-10-PCS | Mod: PBBFAC,,, | Performed by: FAMILY MEDICINE

## 2019-07-31 PROCEDURE — 3074F SYST BP LT 130 MM HG: CPT | Mod: CPTII,S$GLB,, | Performed by: FAMILY MEDICINE

## 2019-07-31 PROCEDURE — 3078F DIAST BP <80 MM HG: CPT | Mod: CPTII,S$GLB,, | Performed by: FAMILY MEDICINE

## 2019-07-31 PROCEDURE — 99214 OFFICE O/P EST MOD 30 MIN: CPT | Mod: S$GLB,,, | Performed by: FAMILY MEDICINE

## 2019-07-31 RX ORDER — CLINDAMYCIN HYDROCHLORIDE 300 MG/1
300 CAPSULE ORAL EVERY 8 HOURS
Qty: 21 CAPSULE | Refills: 0 | Status: SHIPPED | OUTPATIENT
Start: 2019-07-31 | End: 2019-11-04

## 2019-07-31 NOTE — PROGRESS NOTES
Subjective:       Patient ID: Concepcino Don is a 64 y.o. female.    Chief Complaint: Recurrent Skin Infections (leg, eye and underarm)    Rash:  S/p synvisc inj   O: in past 7 days  L: right upper eyelid, left leg, left armpit  D: constant  C: itching burn  Martin: heat  Exac:      Review of Systems   Constitutional: Negative for fever.   Respiratory: Negative for shortness of breath.    Cardiovascular: Negative for chest pain.   Gastrointestinal: Negative for abdominal pain.       Objective:      Physical Exam   Constitutional: She appears well-developed and well-nourished. No distress.   HENT:   Head: Normocephalic and atraumatic.   Pulmonary/Chest: Effort normal and breath sounds normal. No respiratory distress. She has no wheezes.   Abdominal: Soft. She exhibits no distension. There is no tenderness. There is no guarding.   Skin: Skin is warm and dry. No rash noted. She is not diaphoretic. No erythema.   Left leg    mall stye to right upper eyelid warm compresses.     Raised erythematous lesion to left armpit, no fluctuance or induration at this time   Nursing note and vitals reviewed.      Assessment:       1. Cellulitis, unspecified cellulitis site        Plan:     Problem List Items Addressed This Visit        ID    Cellulitis - Primary    Current Assessment & Plan     Left leg    mall stye to right upper eyelid warm compresses.     Raised erythematous lesion to left armpit, no fluctuance or induration at this time - does not need I&D.    Pt needs f/u in next 5 days.  rtc or go to ED.  Area on leg was marked, if spread outside marking on leg rtc         Relevant Medications    clindamycin (CLEOCIN) 300 MG capsule

## 2019-07-31 NOTE — ASSESSMENT & PLAN NOTE
Left leg    mall stye to right upper eyelid warm compresses.     Raised erythematous lesion to left armpit, no fluctuance or induration at this time - does not need I&D.    Pt needs f/u in next 5 days.  rtc or go to ED.  Area on leg was marked, if spread outside marking on leg rtc

## 2019-08-12 ENCOUNTER — OFFICE VISIT (OUTPATIENT)
Dept: PODIATRY | Facility: CLINIC | Age: 64
End: 2019-08-12
Payer: COMMERCIAL

## 2019-08-12 VITALS
DIASTOLIC BLOOD PRESSURE: 86 MMHG | HEART RATE: 74 BPM | BODY MASS INDEX: 42.41 KG/M2 | SYSTOLIC BLOOD PRESSURE: 120 MMHG | WEIGHT: 263.88 LBS | HEIGHT: 66 IN

## 2019-08-12 DIAGNOSIS — B35.3 TINEA PEDIS OF BOTH FEET: ICD-10-CM

## 2019-08-12 DIAGNOSIS — M72.2 PLANTAR FASCIITIS: ICD-10-CM

## 2019-08-12 DIAGNOSIS — M79.672 INFLAMMATORY HEEL PAIN, LEFT: ICD-10-CM

## 2019-08-12 DIAGNOSIS — M76.62 ACHILLES TENDINITIS OF LEFT LOWER EXTREMITY: Primary | ICD-10-CM

## 2019-08-12 PROCEDURE — 3008F PR BODY MASS INDEX (BMI) DOCUMENTED: ICD-10-PCS | Mod: CPTII,S$GLB,, | Performed by: PODIATRIST

## 2019-08-12 PROCEDURE — 99204 PR OFFICE/OUTPT VISIT, NEW, LEVL IV, 45-59 MIN: ICD-10-PCS | Mod: S$GLB,,, | Performed by: PODIATRIST

## 2019-08-12 PROCEDURE — 3008F BODY MASS INDEX DOCD: CPT | Mod: CPTII,S$GLB,, | Performed by: PODIATRIST

## 2019-08-12 PROCEDURE — 99204 OFFICE O/P NEW MOD 45 MIN: CPT | Mod: S$GLB,,, | Performed by: PODIATRIST

## 2019-08-12 PROCEDURE — 99999 PR PBB SHADOW E&M-EST. PATIENT-LVL III: ICD-10-PCS | Mod: PBBFAC,,, | Performed by: PODIATRIST

## 2019-08-12 PROCEDURE — 3079F DIAST BP 80-89 MM HG: CPT | Mod: CPTII,S$GLB,, | Performed by: PODIATRIST

## 2019-08-12 PROCEDURE — 3074F SYST BP LT 130 MM HG: CPT | Mod: CPTII,S$GLB,, | Performed by: PODIATRIST

## 2019-08-12 PROCEDURE — 3074F PR MOST RECENT SYSTOLIC BLOOD PRESSURE < 130 MM HG: ICD-10-PCS | Mod: CPTII,S$GLB,, | Performed by: PODIATRIST

## 2019-08-12 PROCEDURE — 99999 PR PBB SHADOW E&M-EST. PATIENT-LVL III: CPT | Mod: PBBFAC,,, | Performed by: PODIATRIST

## 2019-08-12 PROCEDURE — 3079F PR MOST RECENT DIASTOLIC BLOOD PRESSURE 80-89 MM HG: ICD-10-PCS | Mod: CPTII,S$GLB,, | Performed by: PODIATRIST

## 2019-08-12 RX ORDER — CLOTRIMAZOLE AND BETAMETHASONE DIPROPIONATE 10; .64 MG/G; MG/G
CREAM TOPICAL 2 TIMES DAILY
Qty: 45 G | Refills: 0 | Status: SHIPPED | OUTPATIENT
Start: 2019-08-12 | End: 2019-10-07 | Stop reason: SDUPTHER

## 2019-08-12 RX ORDER — METHYLPREDNISOLONE 4 MG/1
4 TABLET ORAL DAILY
Qty: 1 PACKAGE | Refills: 0 | Status: SHIPPED | OUTPATIENT
Start: 2019-08-12 | End: 2019-11-04

## 2019-08-12 NOTE — LETTER
August 24, 2019      Christiano Mays MD  [id number] [address] [address]           [facility]  [address] [facility] [address]  Phone: [phone]  Fax: [phone]          Patient: Concepcion Don   MR Number: [id number]   YOB: 1955   Date of Visit: 8/12/2019       Dear Dr. Christiano Mays:    Thank you for referring Concepcion Don to me for evaluation. Attached you will find relevant portions of my assessment and plan of care.    If you have questions, please do not hesitate to call me. I look forward to following Concepcion Don along with you.    Sincerely,    Atul Owens DPM    Enclosure  CC:  No Recipients    If you would like to receive this communication electronically, please contact [email or web address] or [phone] to request more information on [employer] access.    For providers and/or their staff who would like to refer a patient to [facility], please contact us through our one-stop-shop provider referral line, [facility], at [phone].    If you feel you have received this communication in error or would no longer like to receive these types of communications, please e-mail [email or web address]

## 2019-08-12 NOTE — PROGRESS NOTES
Subjective:     Patient ID: Concepcion Don is a 64 y.o. female.    Chief Complaint: Heel Pain (L heel spur. c/o no pain. Pt states pain is on,y upon ambulation. Non diabetic Pt. Wears sandals. PCP Dr Mays.)    Concepcion is a 64 y.o. female who presents to the clinic complaining of heel pain in the left foot, especially with the first step in the morning. The pain is described as Aching and Tingling. The onset of the pain was gradual and has worsened over the past several months. Concepcion rates the pain as 5/10. She denies a history of trauma. Prior treatments include Mobic helps a little.     Patient Active Problem List   Diagnosis    Hypertension    Delayed immunizations    Postmenopausal    Routine general medical examination at a health care facility    Thyroid disease    Screening mammogram, encounter for    Chronic pain of left knee    Allergic rhinitis    Colon polyps    Encounter for long-term (current) use of medications    Other hyperlipidemia    Positive D dimer    Localized edema    Morbid obesity with BMI of 40.0-44.9, adult    Calcaneal spur of left foot    Cellulitis       Medication List with Changes/Refills   New Medications    CLOTRIMAZOLE-BETAMETHASONE 1-0.05% (LOTRISONE) CREAM    Apply topically 2 (two) times daily.    METHYLPREDNISOLONE (MEDROL DOSEPACK) 4 MG TABLET    Take 1 tablet (4 mg total) by mouth once daily. Use as instructed on dose pack   Current Medications    ACETAMINOPHEN (TYLENOL) 500 MG TABLET    Take 1 tablet (500 mg total) by mouth every 6 (six) hours as needed for Pain.    ASPIRIN (ECOTRIN) 81 MG EC TABLET    Take 81 mg by mouth once daily.    ATORVASTATIN (LIPITOR) 20 MG TABLET    Take 1 tablet (20 mg total) by mouth once daily.    B COMPLEX VITAMINS TABLET    Take 1 tablet by mouth once daily.    BENAZEPRIL-HYDROCHLORTHIAZIDE (LOTENSIN HCT) 20-12.5 MG PER TABLET    Take 1 tablet by mouth once daily.    CLINDAMYCIN (CLEOCIN) 300 MG CAPSULE    Take 1 capsule (300 mg  total) by mouth every 8 (eight) hours.    DOCUSATE CALCIUM (STOOL SOFTENER ORAL)    Take 1 tablet by mouth once daily.     GLUCOSAMINE SULFATE (GLUCOSAMINE ORAL)    Take 1 tablet by mouth once daily.     LEVOTHYROXINE (SYNTHROID) 25 MCG TABLET    Take 1 tablet (25 mcg total) by mouth once daily.    MULTIVITAMIN CAPSULE    Take 1 capsule by mouth once daily.    PSYLLIUM SEED, WITH DEXTROSE, (FIBER ORAL)    Take by mouth once daily.    TRIAMCINOLONE ACETONIDE 0.5% (KENALOG) 0.5 % CREA    Apply topically 2 (two) times daily.       Review of patient's allergies indicates:  No Known Allergies    Past Surgical History:   Procedure Laterality Date     SECTION, CLASSIC      HYSTERECTOMY      KNEE SURGERY      repleacement right knee    ROTATOR CUFF REPAIR Right        Family History   Problem Relation Age of Onset    Kidney disease Mother     Hypertension Mother     Diabetes Mother     Hypertension Father     Stroke Father     Lupus Sister     Hypertension Maternal Grandmother     Hypertension Maternal Grandfather     Hypertension Paternal Grandmother     Hypertension Paternal Grandfather        Social History     Socioeconomic History    Marital status: Single     Spouse name: Not on file    Number of children: Not on file    Years of education: Not on file    Highest education level: Not on file   Occupational History    Not on file   Social Needs    Financial resource strain: Not hard at all    Food insecurity:     Worry: Never true     Inability: Never true    Transportation needs:     Medical: No     Non-medical: No   Tobacco Use    Smoking status: Never Smoker    Smokeless tobacco: Never Used   Substance and Sexual Activity    Alcohol use: Yes     Frequency: Monthly or less     Drinks per session: 1 or 2     Binge frequency: Never     Comment: occasional    Drug use: No    Sexual activity: Not Currently   Lifestyle    Physical activity:     Days per week: 2 days     Minutes per  "session: 30 min    Stress: Not at all   Relationships    Social connections:     Talks on phone: More than three times a week     Gets together: Once a week     Attends M[employer] service: Not on file     Active member of club or organization: Yes     Attends meetings of clubs or organizations: More than 4 times per year     Relationship status: Never    Other Topics Concern    Not on file   Social History Narrative    Not on file       Vitals:    08/12/19 0847   BP: 120/86   Pulse: 74   Weight: 119.7 kg (263 lb 14.3 oz)   Height: 5' 6" (1.676 m)   PainSc: 0-No pain       Review of Systems   Constitutional: Negative for chills and fever.   Respiratory: Negative for shortness of breath.    Cardiovascular: Negative for chest pain, palpitations, orthopnea, claudication and leg swelling.   Gastrointestinal: Negative for diarrhea, nausea and vomiting.   Musculoskeletal: Negative for joint pain.   Skin: Positive for itching. Negative for rash.   Neurological: Negative for dizziness, tingling, sensory change, focal weakness and weakness.   Psychiatric/Behavioral: Negative.            Objective:   PHYSICAL EXAM: Apperance: Alert and orient in no distress,well developed, and with good attention to grooming and body habits  Patient presents ambulating in casual shoe.  Lower Extremity Exam  VASCULAR: Dorsalis pedis pulses 2/4 bilateral and Posterior Tibial pulses 2/4 bilateral. Capillary fill time <4 seconds bilateral. No edema observed bilateral. Varicosities absent bilateral. Skin temperature of the lower extremities is warm to warm, proximal to distal. Hair growth WNL bilateral.  DERMATOLOGICAL: No skin rashes, subcutaneous nodules, lesions, or ulcers observed bilateral. Dry and scaly skin noted plantarly bilateral in moccasin distribution.   NEUROLOGICAL: Light touch, sharp-dull, proprioception all present and equal bilaterally.    MUSCULOSKELETAL: Muscle strength 5/5 for all foot inverters, everters, " plantarflexors, and dorsiflexors bilateral. Ankle joints bilateral shows decreased ROM. bilateral ankle ROM shows greater decrease in dorsiflexion with knee extended. Ankle joint ROM is pain free and without crepitus bilateral. Pain to palpation left plantar medial tubercle. Plantar medial aspect of left heels shows tenderness to palpation. No pain on medial-lateral compression of the calcaneus. Pain with palpation of left posterior 1/3 calcaneus at region of Achilles tendon insertion.         Assessment:   The following prescriptions/orders were written today per listed diagnosis  Achilles tendinitis of left lower extremity - Left Foot  -     methylPREDNISolone (MEDROL DOSEPACK) 4 mg tablet; Take 1 tablet (4 mg total) by mouth once daily. Use as instructed on dose pack  Dispense: 1 Package; Refill: 0    Plantar fasciitis - Left Foot  -     methylPREDNISolone (MEDROL DOSEPACK) 4 mg tablet; Take 1 tablet (4 mg total) by mouth once daily. Use as instructed on dose pack  Dispense: 1 Package; Refill: 0    Inflammatory heel pain, left  -     methylPREDNISolone (MEDROL DOSEPACK) 4 mg tablet; Take 1 tablet (4 mg total) by mouth once daily. Use as instructed on dose pack  Dispense: 1 Package; Refill: 0    Tinea pedis of both feet  -     clotrimazole-betamethasone 1-0.05% (LOTRISONE) cream; Apply topically 2 (two) times daily.  Dispense: 45 g; Refill: 0          Plan:   Achilles tendinitis of left lower extremity - Left Foot  -     methylPREDNISolone (MEDROL DOSEPACK) 4 mg tablet; Take 1 tablet (4 mg total) by mouth once daily. Use as instructed on dose pack  Dispense: 1 Package; Refill: 0    Plantar fasciitis - Left Foot  -     methylPREDNISolone (MEDROL DOSEPACK) 4 mg tablet; Take 1 tablet (4 mg total) by mouth once daily. Use as instructed on dose pack  Dispense: 1 Package; Refill: 0    Inflammatory heel pain, left  -     methylPREDNISolone (MEDROL DOSEPACK) 4 mg tablet; Take 1 tablet (4 mg total) by mouth once daily. Use  as instructed on dose pack  Dispense: 1 Package; Refill: 0    Tinea pedis of both feet  -     clotrimazole-betamethasone 1-0.05% (LOTRISONE) cream; Apply topically 2 (two) times daily.  Dispense: 45 g; Refill: 0      I counseled the patient on her conditions, regarding findings of my examination, my impressions, and usual treatment plan.   I explained to the patient that etiology and treatment options for heel pain including rest,  ice messages, stretching exercises, strappings/tappings, NSAID's, injections, new shoegear with orthotic inserts, and/or surgical treatment.   Patient agreed to oral and stretching therapy today.   I gave written and verbal instructions on heel cord stretching and this was demonstrated for the patient. Patient expressed understanding.  Prescribed Medrol Dosepak to be taken as directed on package. Discussed possible increase in blood sugar with taking steroid medication. Patient advised on the possible elevation of blood pressure sugar and caution to take pills as prescribed and to discontinue use if symptoms arise, patient agreed.   Patient instructed on adequate icing techniques. Patient should ice the affected area at least once per day x 10 minutes for 10 days . I advised the  patient that extra icing would also be beneficial to ensure adequate anti inflammatory effect.   The patient and I reviewed the types of shoes she should be wearing, my recommendation includes generally the best time of the day for a shoe fitting is the afternoon, shoes with a wide toe box, very good cushion, and tennis shoes with removable inner soles. Patient instructed to gradually return to prescribed inserts from past podiatrist. Dispensed heel lifts to be worn in shoes.    Discuss treatment options for tinea pedis.  I explained that fungus lives in a warm dark moist environment and therefore patient should make every attempt to keep feet clean and dry.  We discussed drying feet thoroughly after shower  particularly between the toes.   Patient instructed to spray all shoes with Lysol disinfectant spray and let dry before wearing. Patient instructed to wash all socks in hot water and bleach.  Prescribed Lotrisone cream to be applied twice daily to areas of feet.   Patient to return in 6 weeks.                   Atul Owens DPM  [facility]

## 2019-08-30 ENCOUNTER — OFFICE VISIT (OUTPATIENT)
Dept: INTERNAL MEDICINE | Facility: CLINIC | Age: 64
End: 2019-08-30
Payer: COMMERCIAL

## 2019-08-30 VITALS
SYSTOLIC BLOOD PRESSURE: 122 MMHG | DIASTOLIC BLOOD PRESSURE: 71 MMHG | TEMPERATURE: 96 F | HEIGHT: 66 IN | OXYGEN SATURATION: 95 % | HEART RATE: 71 BPM | WEIGHT: 267 LBS | BODY MASS INDEX: 42.91 KG/M2

## 2019-08-30 DIAGNOSIS — L03.90 CELLULITIS, UNSPECIFIED CELLULITIS SITE: ICD-10-CM

## 2019-08-30 DIAGNOSIS — E78.49 OTHER HYPERLIPIDEMIA: ICD-10-CM

## 2019-08-30 DIAGNOSIS — I10 ESSENTIAL HYPERTENSION: Primary | ICD-10-CM

## 2019-08-30 PROCEDURE — 3074F SYST BP LT 130 MM HG: CPT | Mod: CPTII,S$GLB,, | Performed by: FAMILY MEDICINE

## 2019-08-30 PROCEDURE — 90686 IIV4 VACC NO PRSV 0.5 ML IM: CPT | Mod: S$GLB,,, | Performed by: FAMILY MEDICINE

## 2019-08-30 PROCEDURE — 90471 IMMUNIZATION ADMIN: CPT | Mod: S$GLB,,, | Performed by: FAMILY MEDICINE

## 2019-08-30 PROCEDURE — 99999 PR PBB SHADOW E&M-EST. PATIENT-LVL III: ICD-10-PCS | Mod: PBBFAC,,, | Performed by: FAMILY MEDICINE

## 2019-08-30 PROCEDURE — 99999 PR PBB SHADOW E&M-EST. PATIENT-LVL III: CPT | Mod: PBBFAC,,, | Performed by: FAMILY MEDICINE

## 2019-08-30 PROCEDURE — 99214 PR OFFICE/OUTPT VISIT, EST, LEVL IV, 30-39 MIN: ICD-10-PCS | Mod: 25,S$GLB,, | Performed by: FAMILY MEDICINE

## 2019-08-30 PROCEDURE — 3078F PR MOST RECENT DIASTOLIC BLOOD PRESSURE < 80 MM HG: ICD-10-PCS | Mod: CPTII,S$GLB,, | Performed by: FAMILY MEDICINE

## 2019-08-30 PROCEDURE — 90471 FLU VACCINE (QUAD) GREATER THAN OR EQUAL TO 3YO PRESERVATIVE FREE IM: ICD-10-PCS | Mod: S$GLB,,, | Performed by: FAMILY MEDICINE

## 2019-08-30 PROCEDURE — 3074F PR MOST RECENT SYSTOLIC BLOOD PRESSURE < 130 MM HG: ICD-10-PCS | Mod: CPTII,S$GLB,, | Performed by: FAMILY MEDICINE

## 2019-08-30 PROCEDURE — 99214 OFFICE O/P EST MOD 30 MIN: CPT | Mod: 25,S$GLB,, | Performed by: FAMILY MEDICINE

## 2019-08-30 PROCEDURE — 90686 FLU VACCINE (QUAD) GREATER THAN OR EQUAL TO 3YO PRESERVATIVE FREE IM: ICD-10-PCS | Mod: S$GLB,,, | Performed by: FAMILY MEDICINE

## 2019-08-30 PROCEDURE — 3008F BODY MASS INDEX DOCD: CPT | Mod: CPTII,S$GLB,, | Performed by: FAMILY MEDICINE

## 2019-08-30 PROCEDURE — 3078F DIAST BP <80 MM HG: CPT | Mod: CPTII,S$GLB,, | Performed by: FAMILY MEDICINE

## 2019-08-30 PROCEDURE — 3008F PR BODY MASS INDEX (BMI) DOCUMENTED: ICD-10-PCS | Mod: CPTII,S$GLB,, | Performed by: FAMILY MEDICINE

## 2019-08-30 RX ORDER — MUPIROCIN 20 MG/G
OINTMENT TOPICAL 3 TIMES DAILY
Qty: 30 G | Refills: 0 | Status: SHIPPED | OUTPATIENT
Start: 2019-08-30 | End: 2022-08-23 | Stop reason: SDUPTHER

## 2019-08-30 NOTE — PROGRESS NOTES
Subjective:       Patient ID: Concepcion Don is a 64 y.o. female.    Chief Complaint: sore on left leg and Flu Vaccine    Cellulitis:  O: 7-10 days  L: left knee  D: resolved  C:  Martin: abx  Exac: pants rubbing leg      Review of Systems   Respiratory: Negative for shortness of breath.    Cardiovascular: Negative for chest pain.   Gastrointestinal: Negative for abdominal pain.   Skin: Positive for wound.       Objective:      Physical Exam   Constitutional: She appears well-developed and well-nourished. No distress.   HENT:   Head: Normocephalic and atraumatic.   Pulmonary/Chest: Effort normal and breath sounds normal. No respiratory distress. She has no wheezes.   Musculoskeletal: Normal range of motion. She exhibits no edema, tenderness or deformity.   Skin: Skin is warm and dry. No rash noted. She is not diaphoretic. No erythema.   Lesion to left knee, well healing   Nursing note and vitals reviewed.      Assessment:       1. Essential hypertension    2. Other hyperlipidemia    3. Cellulitis, unspecified cellulitis site        Plan:     Problem List Items Addressed This Visit        Cardiac/Vascular    Hypertension - Primary    Current Assessment & Plan     Controlled, cont benazepril - hctz         Other hyperlipidemia    Current Assessment & Plan     Cont statin            ID    Cellulitis    Current Assessment & Plan     Resolved, cont clindamycin         Relevant Medications    mupirocin (BACTROBAN) 2 % ointment

## 2019-09-04 ENCOUNTER — TELEPHONE (OUTPATIENT)
Dept: PODIATRY | Facility: CLINIC | Age: 64
End: 2019-09-04

## 2019-09-04 NOTE — TELEPHONE ENCOUNTER
Contacted Pt in regards to appt on September 25. Notified Pt that provider would be out of office that day and that we would get her rescheduled to the next available. Pt agreed to new appt, time and location.

## 2019-09-28 DIAGNOSIS — E78.49 OTHER HYPERLIPIDEMIA: ICD-10-CM

## 2019-09-30 RX ORDER — ATORVASTATIN CALCIUM 20 MG/1
TABLET, FILM COATED ORAL
Qty: 90 TABLET | Refills: 1 | Status: SHIPPED | OUTPATIENT
Start: 2019-09-30 | End: 2019-10-02 | Stop reason: SDUPTHER

## 2019-10-02 DIAGNOSIS — E78.49 OTHER HYPERLIPIDEMIA: ICD-10-CM

## 2019-10-02 RX ORDER — ATORVASTATIN CALCIUM 20 MG/1
20 TABLET, FILM COATED ORAL DAILY
Qty: 90 TABLET | Refills: 1 | Status: SHIPPED | OUTPATIENT
Start: 2019-10-02 | End: 2021-02-09 | Stop reason: SDUPTHER

## 2019-10-07 ENCOUNTER — OFFICE VISIT (OUTPATIENT)
Dept: PODIATRY | Facility: CLINIC | Age: 64
End: 2019-10-07
Payer: COMMERCIAL

## 2019-10-07 VITALS
DIASTOLIC BLOOD PRESSURE: 80 MMHG | SYSTOLIC BLOOD PRESSURE: 124 MMHG | WEIGHT: 267 LBS | BODY MASS INDEX: 42.91 KG/M2 | HEART RATE: 62 BPM | HEIGHT: 66 IN

## 2019-10-07 DIAGNOSIS — L60.9 DISEASE OF NAIL: ICD-10-CM

## 2019-10-07 DIAGNOSIS — M76.62 ACHILLES TENDINITIS OF LEFT LOWER EXTREMITY: Primary | ICD-10-CM

## 2019-10-07 DIAGNOSIS — B35.3 TINEA PEDIS OF BOTH FEET: ICD-10-CM

## 2019-10-07 PROCEDURE — 99999 PR PBB SHADOW E&M-EST. PATIENT-LVL III: ICD-10-PCS | Mod: PBBFAC,,, | Performed by: PODIATRIST

## 2019-10-07 PROCEDURE — 3008F PR BODY MASS INDEX (BMI) DOCUMENTED: ICD-10-PCS | Mod: CPTII,S$GLB,, | Performed by: PODIATRIST

## 2019-10-07 PROCEDURE — 88312 SPECIAL STAINS GROUP 1: CPT | Performed by: PATHOLOGY

## 2019-10-07 PROCEDURE — 3079F PR MOST RECENT DIASTOLIC BLOOD PRESSURE 80-89 MM HG: ICD-10-PCS | Mod: CPTII,S$GLB,, | Performed by: PODIATRIST

## 2019-10-07 PROCEDURE — 88312 TISSUE SPECIMEN TO PATHOLOGY, PODIATRY: ICD-10-PCS | Mod: 26,,, | Performed by: PATHOLOGY

## 2019-10-07 PROCEDURE — 3008F BODY MASS INDEX DOCD: CPT | Mod: CPTII,S$GLB,, | Performed by: PODIATRIST

## 2019-10-07 PROCEDURE — 88312 SPECIAL STAINS GROUP 1: CPT | Mod: 26,,, | Performed by: PATHOLOGY

## 2019-10-07 PROCEDURE — 3074F SYST BP LT 130 MM HG: CPT | Mod: CPTII,S$GLB,, | Performed by: PODIATRIST

## 2019-10-07 PROCEDURE — 3074F PR MOST RECENT SYSTOLIC BLOOD PRESSURE < 130 MM HG: ICD-10-PCS | Mod: CPTII,S$GLB,, | Performed by: PODIATRIST

## 2019-10-07 PROCEDURE — 3079F DIAST BP 80-89 MM HG: CPT | Mod: CPTII,S$GLB,, | Performed by: PODIATRIST

## 2019-10-07 PROCEDURE — 99214 PR OFFICE/OUTPT VISIT, EST, LEVL IV, 30-39 MIN: ICD-10-PCS | Mod: 25,S$GLB,, | Performed by: PODIATRIST

## 2019-10-07 PROCEDURE — 88302 TISSUE SPECIMEN TO PATHOLOGY, PODIATRY: ICD-10-PCS | Mod: 26,,, | Performed by: PATHOLOGY

## 2019-10-07 PROCEDURE — 99999 PR PBB SHADOW E&M-EST. PATIENT-LVL III: CPT | Mod: PBBFAC,,, | Performed by: PODIATRIST

## 2019-10-07 PROCEDURE — 99214 OFFICE O/P EST MOD 30 MIN: CPT | Mod: 25,S$GLB,, | Performed by: PODIATRIST

## 2019-10-07 PROCEDURE — 88302 TISSUE EXAM BY PATHOLOGIST: CPT | Mod: 26,,, | Performed by: PATHOLOGY

## 2019-10-07 RX ORDER — CLOTRIMAZOLE AND BETAMETHASONE DIPROPIONATE 10; .64 MG/G; MG/G
CREAM TOPICAL 2 TIMES DAILY
Qty: 45 G | Refills: 0 | Status: SHIPPED | OUTPATIENT
Start: 2019-10-07 | End: 2020-01-21

## 2019-10-07 NOTE — PROGRESS NOTES
Subjective:     Patient ID: Concepcion Don is a 64 y.o. female.    Chief Complaint: Follow-up (6 wks L, foot pain, rates pain 2/10, wears tennis with socks, non diabetic, PCP Christiano Mays )    Concepcion is a 64 y.o. female who presents to the podiatry clinic for follow up left foot pain. Patient states pain is better and rates pain 2/10. Current symptoms include: ability to bear weight, but with some pain. Aggravating factors: walking. Symptoms have gradually improved. Treatment to date: ice, prescription NSAIDS which are effective and rest. Patients rates pain 2/10 on pain scale. Patient also complains of discolored bilateral 5th toenails. Patient has no other pedal complaints at this time.     Patient Active Problem List   Diagnosis    Hypertension    Delayed immunizations    Postmenopausal    Routine general medical examination at a health care facility    Thyroid disease    Screening mammogram, encounter for    Chronic pain of left knee    Allergic rhinitis    Colon polyps    Encounter for long-term (current) use of medications    Other hyperlipidemia    Positive D dimer    Localized edema    Morbid obesity with BMI of 40.0-44.9, adult    Calcaneal spur of left foot    Cellulitis       Medication List with Changes/Refills   Current Medications    ACETAMINOPHEN (TYLENOL) 500 MG TABLET    Take 1 tablet (500 mg total) by mouth every 6 (six) hours as needed for Pain.    ASPIRIN (ECOTRIN) 81 MG EC TABLET    Take 81 mg by mouth once daily.    ATORVASTATIN (LIPITOR) 20 MG TABLET    Take 1 tablet (20 mg total) by mouth once daily.    B COMPLEX VITAMINS TABLET    Take 1 tablet by mouth once daily.    BENAZEPRIL-HYDROCHLORTHIAZIDE (LOTENSIN HCT) 20-12.5 MG PER TABLET    Take 1 tablet by mouth once daily.    CLINDAMYCIN (CLEOCIN) 300 MG CAPSULE    Take 1 capsule (300 mg total) by mouth every 8 (eight) hours.    DOCUSATE CALCIUM (STOOL SOFTENER ORAL)    Take 1 tablet by mouth once daily.     GLUCOSAMINE SULFATE  (GLUCOSAMINE ORAL)    Take 1 tablet by mouth once daily.     LEVOTHYROXINE (SYNTHROID) 25 MCG TABLET    Take 1 tablet (25 mcg total) by mouth once daily.    METHYLPREDNISOLONE (MEDROL DOSEPACK) 4 MG TABLET    Take 1 tablet (4 mg total) by mouth once daily. Use as instructed on dose pack    MULTIVITAMIN CAPSULE    Take 1 capsule by mouth once daily.    MUPIROCIN (BACTROBAN) 2 % OINTMENT    Apply topically 3 (three) times daily.    PSYLLIUM SEED, WITH DEXTROSE, (FIBER ORAL)    Take by mouth once daily.    TRIAMCINOLONE ACETONIDE 0.5% (KENALOG) 0.5 % CREA    Apply topically 2 (two) times daily.   Changed and/or Refilled Medications    Modified Medication Previous Medication    CLOTRIMAZOLE-BETAMETHASONE 1-0.05% (LOTRISONE) CREAM clotrimazole-betamethasone 1-0.05% (LOTRISONE) cream       Apply topically 2 (two) times daily.    Apply topically 2 (two) times daily.       Review of patient's allergies indicates:  No Known Allergies    Past Surgical History:   Procedure Laterality Date    cataract surgery  2019    Left eye     SECTION, CLASSIC      HYSTERECTOMY      KNEE SURGERY      repleacement right knee    ROTATOR CUFF REPAIR Right        Family History   Problem Relation Age of Onset    Kidney disease Mother     Hypertension Mother     Diabetes Mother     Hypertension Father     Stroke Father     Lupus Sister     Hypertension Maternal Grandmother     Hypertension Maternal Grandfather     Hypertension Paternal Grandmother     Hypertension Paternal Grandfather        Social History     Socioeconomic History    Marital status: Single     Spouse name: Not on file    Number of children: Not on file    Years of education: Not on file    Highest education level: Not on file   Occupational History    Not on file   Social Needs    Financial resource strain: Not hard at all    Food insecurity:     Worry: Never true     Inability: Never true    Transportation needs:     Medical: No      "Non-medical: No   Tobacco Use    Smoking status: Never Smoker    Smokeless tobacco: Never Used   Substance and Sexual Activity    Alcohol use: Yes     Frequency: Monthly or less     Drinks per session: 1 or 2     Binge frequency: Never     Comment: occasional    Drug use: No    Sexual activity: Not Currently     Partners: Male   Lifestyle    Physical activity:     Days per week: 2 days     Minutes per session: 30 min    Stress: Not at all   Relationships    Social connections:     Talks on phone: More than three times a week     Gets together: Once a week     Attends [employer] service: Not on file     Active member of club or organization: Yes     Attends meetings of clubs or organizations: More than 4 times per year     Relationship status: Never    Other Topics Concern    Not on file   Social History Narrative    Not on file       Vitals:    10/07/19 0830   BP: 124/80   Pulse: 62   Weight: 121.1 kg (266 lb 15.6 oz)   Height: 5' 6" (1.676 m)   PainSc:   2   PainLoc: Foot         Review of Systems   Constitutional: Negative for chills and fever.   Respiratory: Negative for shortness of breath.    Cardiovascular: Negative for chest pain, palpitations, orthopnea, claudication and leg swelling.   Gastrointestinal: Negative for diarrhea, nausea and vomiting.   Musculoskeletal: Negative for joint pain.   Skin: Negative for rash.   Neurological: Negative for dizziness, tingling, sensory change, focal weakness and weakness.   Psychiatric/Behavioral: Negative.              Objective:      PHYSICAL EXAM: Apperance: Alert and orient in no distress,well developed, and with good attention to grooming and body habits  Patient presents ambulating in casual shoe.  Lower Extremity Exam  VASCULAR: Dorsalis pedis pulses 2/4 bilateral and Posterior Tibial pulses 2/4 bilateral. Capillary fill time <4 seconds bilateral. No edema observed bilateral. Varicosities absent bilateral. Skin temperature of the lower extremities " is warm to warm, proximal to distal. Hair growth WNL bilateral.  DERMATOLOGICAL: No skin rashes, subcutaneous nodules, lesions, or ulcers observed bilateral. Decreased ry and scaly skin noted plantarly bilateral in moccasin distribution. Bilateral 5th toenails elongated, thickened, and discolored with subungual debris.  NEUROLOGICAL: Light touch, sharp-dull, proprioception all present and equal bilaterally.    MUSCULOSKELETAL: Muscle strength 5/5 for all foot inverters, everters, plantarflexors, and dorsiflexors bilateral. Ankle joints bilateral shows decreased ROM. bilateral ankle ROM shows greater decrease in dorsiflexion with knee extended. Ankle joint ROM is pain free and without crepitus bilateral. Minimal pain to palpation left plantar medial tubercle. Plantar medial aspect of left heels shows tenderness to palpation. No pain on medial-lateral compression of the calcaneus. Minimal pain with palpation of left posterior 1/3 calcaneus at region of Achilles tendon insertion.            Assessment:       Encounter Diagnoses   Name Primary?    Achilles tendinitis of left lower extremity - Left Foot Yes    Tinea pedis of both feet     Disease of nail          Plan:   Achilles tendinitis of left lower extremity - Left Foot    Tinea pedis of both feet  -     clotrimazole-betamethasone 1-0.05% (LOTRISONE) cream; Apply topically 2 (two) times daily.  Dispense: 45 g; Refill: 0    Disease of nail  -     Tissue Specimen To Pathology, Podiatry      I counseled the patient on her conditions, regarding findings of my examination, my impressions, and usual treatment plan.   Patient instructed to continue icing and stretching exercises.   Patient instructed to continue Lotrisone cream to feet daily.   The patient and I reviewed the types of shoes she should be wearing, my recommendation includes generally the best time of the day for a shoe fitting is the afternoon, shoes with a wide toe box, very good cushion, and tennis  shoes with removable inner soles.The patient and I reviewed my recommendations for over-the-counter orthotic inserts.   Patient instructed to spray all shoes with Lysol disinfectant spray and let dry before wearing. Patient instructed to wash all socks in hot water and bleach.  Discuss treatment options for nail fungus.  I explained that fungus lives in a warm dark moist environment and therefore patient should make every attempt to keep feet clean and dry.  We discussed drying feet thoroughly after shower particularly between the toes and then applying powder between the toes and in the shoes.    For fungal toenails I prescribed topical medication to be used daily for up to a year.  We also discussed oral Lamisil but I did not recommend it as a first line of treatment since it is an internal medicine that may potentially have side effects, including liver problems.   With patient's permission, nail clippings obtained for fungal nail culture.   Patient to return pending nail culture results.               Atul Owens DPM  [facility]

## 2019-10-21 PROBLEM — Z00.00 ROUTINE GENERAL MEDICAL EXAMINATION AT A HEALTH CARE FACILITY: Status: RESOLVED | Noted: 2017-11-29 | Resolved: 2019-10-21

## 2019-11-04 ENCOUNTER — LAB VISIT (OUTPATIENT)
Dept: LAB | Facility: HOSPITAL | Age: 64
End: 2019-11-04
Attending: FAMILY MEDICINE
Payer: COMMERCIAL

## 2019-11-04 ENCOUNTER — OFFICE VISIT (OUTPATIENT)
Dept: INTERNAL MEDICINE | Facility: CLINIC | Age: 64
End: 2019-11-04
Payer: COMMERCIAL

## 2019-11-04 VITALS
HEIGHT: 66 IN | RESPIRATION RATE: 19 BRPM | OXYGEN SATURATION: 99 % | WEIGHT: 268.31 LBS | DIASTOLIC BLOOD PRESSURE: 70 MMHG | SYSTOLIC BLOOD PRESSURE: 122 MMHG | TEMPERATURE: 97 F | BODY MASS INDEX: 43.12 KG/M2 | HEART RATE: 88 BPM

## 2019-11-04 DIAGNOSIS — I10 ESSENTIAL HYPERTENSION: ICD-10-CM

## 2019-11-04 DIAGNOSIS — J06.9 VIRAL UPPER RESPIRATORY TRACT INFECTION: Primary | ICD-10-CM

## 2019-11-04 LAB
ALBUMIN SERPL BCP-MCNC: 3.3 G/DL (ref 3.5–5.2)
ALP SERPL-CCNC: 113 U/L (ref 55–135)
ALT SERPL W/O P-5'-P-CCNC: 24 U/L (ref 10–44)
ANION GAP SERPL CALC-SCNC: 7 MMOL/L (ref 8–16)
AST SERPL-CCNC: 25 U/L (ref 10–40)
BILIRUB SERPL-MCNC: 0.5 MG/DL (ref 0.1–1)
BUN SERPL-MCNC: 13 MG/DL (ref 8–23)
CALCIUM SERPL-MCNC: 9 MG/DL (ref 8.7–10.5)
CHLORIDE SERPL-SCNC: 105 MMOL/L (ref 95–110)
CO2 SERPL-SCNC: 28 MMOL/L (ref 23–29)
CREAT SERPL-MCNC: 0.8 MG/DL (ref 0.5–1.4)
EST. GFR  (AFRICAN AMERICAN): >60 ML/MIN/1.73 M^2
EST. GFR  (NON AFRICAN AMERICAN): >60 ML/MIN/1.73 M^2
GLUCOSE SERPL-MCNC: 95 MG/DL (ref 70–110)
POTASSIUM SERPL-SCNC: 4.1 MMOL/L (ref 3.5–5.1)
PROT SERPL-MCNC: 8.3 G/DL (ref 6–8.4)
SODIUM SERPL-SCNC: 140 MMOL/L (ref 136–145)

## 2019-11-04 PROCEDURE — 36415 COLL VENOUS BLD VENIPUNCTURE: CPT | Mod: PO

## 2019-11-04 PROCEDURE — 99999 PR PBB SHADOW E&M-EST. PATIENT-LVL III: CPT | Mod: PBBFAC,,, | Performed by: FAMILY MEDICINE

## 2019-11-04 PROCEDURE — 80053 COMPREHEN METABOLIC PANEL: CPT | Mod: PO

## 2019-11-04 PROCEDURE — 3074F SYST BP LT 130 MM HG: CPT | Mod: CPTII,S$GLB,, | Performed by: FAMILY MEDICINE

## 2019-11-04 PROCEDURE — 3078F DIAST BP <80 MM HG: CPT | Mod: CPTII,S$GLB,, | Performed by: FAMILY MEDICINE

## 2019-11-04 PROCEDURE — 99214 PR OFFICE/OUTPT VISIT, EST, LEVL IV, 30-39 MIN: ICD-10-PCS | Mod: S$GLB,,, | Performed by: FAMILY MEDICINE

## 2019-11-04 PROCEDURE — 99999 PR PBB SHADOW E&M-EST. PATIENT-LVL III: ICD-10-PCS | Mod: PBBFAC,,, | Performed by: FAMILY MEDICINE

## 2019-11-04 PROCEDURE — 3008F PR BODY MASS INDEX (BMI) DOCUMENTED: ICD-10-PCS | Mod: CPTII,S$GLB,, | Performed by: FAMILY MEDICINE

## 2019-11-04 PROCEDURE — 3078F PR MOST RECENT DIASTOLIC BLOOD PRESSURE < 80 MM HG: ICD-10-PCS | Mod: CPTII,S$GLB,, | Performed by: FAMILY MEDICINE

## 2019-11-04 PROCEDURE — 3008F BODY MASS INDEX DOCD: CPT | Mod: CPTII,S$GLB,, | Performed by: FAMILY MEDICINE

## 2019-11-04 PROCEDURE — 99214 OFFICE O/P EST MOD 30 MIN: CPT | Mod: S$GLB,,, | Performed by: FAMILY MEDICINE

## 2019-11-04 PROCEDURE — 3074F PR MOST RECENT SYSTOLIC BLOOD PRESSURE < 130 MM HG: ICD-10-PCS | Mod: CPTII,S$GLB,, | Performed by: FAMILY MEDICINE

## 2019-11-04 RX ORDER — PROMETHAZINE HYDROCHLORIDE AND DEXTROMETHORPHAN HYDROBROMIDE 6.25; 15 MG/5ML; MG/5ML
5 SYRUP ORAL NIGHTLY
Qty: 118 ML | Refills: 0 | Status: SHIPPED | OUTPATIENT
Start: 2019-11-04 | End: 2020-01-21 | Stop reason: ALTCHOICE

## 2019-11-04 RX ORDER — MELOXICAM 15 MG/1
15 TABLET ORAL DAILY
Refills: 6 | COMMUNITY
Start: 2019-10-25 | End: 2020-10-02

## 2019-11-04 RX ORDER — AMOXICILLIN 500 MG/1
CAPSULE ORAL
Refills: 0 | COMMUNITY
Start: 2019-10-24 | End: 2020-01-21 | Stop reason: ALTCHOICE

## 2019-11-04 RX ORDER — FLUTICASONE PROPIONATE 50 MCG
1 SPRAY, SUSPENSION (ML) NASAL DAILY
Qty: 16 G | Refills: 5 | Status: SHIPPED | OUTPATIENT
Start: 2019-11-04 | End: 2020-04-27 | Stop reason: SDUPTHER

## 2019-11-04 RX ORDER — CETIRIZINE HYDROCHLORIDE 10 MG/1
10 TABLET ORAL DAILY
COMMUNITY
Start: 2019-11-04 | End: 2020-01-21

## 2019-11-04 RX ORDER — BENZONATATE 100 MG/1
100 CAPSULE ORAL 3 TIMES DAILY PRN
Qty: 30 CAPSULE | Refills: 0 | Status: SHIPPED | OUTPATIENT
Start: 2019-11-04 | End: 2019-11-14

## 2019-11-04 RX ORDER — BENAZEPRIL HYDROCHLORIDE AND HYDROCHLOROTHIAZIDE 20; 12.5 MG/1; MG/1
1 TABLET ORAL DAILY
Qty: 90 TABLET | Refills: 1 | Status: SHIPPED | OUTPATIENT
Start: 2019-11-04 | End: 2020-01-02 | Stop reason: SDUPTHER

## 2019-11-04 NOTE — PROGRESS NOTES
Some lab values are out of range, but I feel that no further workup is required at this time.  Please feel free to contact my office with any questions.    Christiano Mays MD

## 2019-11-04 NOTE — PROGRESS NOTES
Subjective:       Patient ID: Concepcion Don is a 64 y.o. female.    Chief Complaint: Hypertension    Pt. Was seen at  for sore throat and nasal congestion two weeks ago.  She was treated with steroids and antibiotics.  Today she c/o a dry cough.     URI    This is a new problem. The current episode started 1 to 4 weeks ago. The problem has been gradually improving. There has been no fever. Associated symptoms include congestion and coughing. Pertinent negatives include no abdominal pain, dysuria, headaches, joint pain, nausea, neck pain, rash, rhinorrhea, sinus pain, sneezing, sore throat, vomiting or wheezing. She has tried decongestant (steroids, Amoxicillin) for the symptoms. The treatment provided moderate relief.     Review of Systems   Constitutional: Negative.  Negative for activity change, appetite change, chills and fever.   HENT: Positive for congestion. Negative for rhinorrhea, sinus pain, sneezing and sore throat.    Respiratory: Positive for cough. Negative for shortness of breath and wheezing.    Cardiovascular: Negative for palpitations.   Gastrointestinal: Negative.  Negative for abdominal pain, nausea and vomiting.   Genitourinary: Negative for dysuria.   Musculoskeletal: Negative for arthralgias, back pain, joint pain, myalgias and neck pain.   Skin: Negative for rash.   Neurological: Positive for light-headedness. Negative for dizziness and headaches.       Objective:      Physical Exam   Constitutional: She is oriented to person, place, and time. She appears well-developed and well-nourished.   HENT:   Head: Atraumatic.   Eyes: Conjunctivae are normal.   Neck: Normal range of motion. Neck supple.   Cardiovascular: Normal rate, regular rhythm, normal heart sounds and intact distal pulses.   No murmur heard.  Pulmonary/Chest: Effort normal and breath sounds normal. No respiratory distress.   Positive for Coughing  Pale nasal mucosa   Abdominal: Soft. Bowel sounds are normal. She exhibits no  distension. There is no tenderness.   Musculoskeletal: Normal range of motion. She exhibits no edema.   Lymphadenopathy:     She has no cervical adenopathy.   Neurological: She is alert and oriented to person, place, and time.   Skin: Skin is dry. No rash noted.       Assessment:       1. Viral upper respiratory tract infection    2. Essential hypertension        Plan:     Problem List Items Addressed This Visit        ENT    Viral upper respiratory tract infection - Primary    Relevant Medications    fluticasone propionate (FLONASE) 50 mcg/actuation nasal spray    cetirizine (ZYRTEC) 10 MG tablet    benzonatate (TESSALON) 100 MG capsule    promethazine-dextromethorphan (PROMETHAZINE-DM) 6.25-15 mg/5 mL Syrp       Cardiac/Vascular    Hypertension    Relevant Medications    benazepril-hydrochlorthiazide (LOTENSIN HCT) 20-12.5 mg per tablet    Other Relevant Orders    Comprehensive metabolic panel

## 2020-01-02 DIAGNOSIS — I10 ESSENTIAL HYPERTENSION: ICD-10-CM

## 2020-01-03 ENCOUNTER — PATIENT OUTREACH (OUTPATIENT)
Dept: ADMINISTRATIVE | Facility: HOSPITAL | Age: 65
End: 2020-01-03

## 2020-01-03 RX ORDER — BENAZEPRIL HYDROCHLORIDE AND HYDROCHLOROTHIAZIDE 20; 12.5 MG/1; MG/1
1 TABLET ORAL DAILY
Qty: 90 TABLET | Refills: 4 | Status: SHIPPED | OUTPATIENT
Start: 2020-01-03 | End: 2021-02-09 | Stop reason: SDUPTHER

## 2020-01-15 DIAGNOSIS — E07.9 THYROID DISEASE: ICD-10-CM

## 2020-01-16 RX ORDER — LEVOTHYROXINE SODIUM 25 UG/1
TABLET ORAL
Qty: 90 TABLET | Refills: 1 | Status: SHIPPED | OUTPATIENT
Start: 2020-01-16 | End: 2020-01-21 | Stop reason: SDUPTHER

## 2020-01-21 ENCOUNTER — PATIENT MESSAGE (OUTPATIENT)
Dept: INTERNAL MEDICINE | Facility: CLINIC | Age: 65
End: 2020-01-21

## 2020-01-21 ENCOUNTER — OFFICE VISIT (OUTPATIENT)
Dept: INTERNAL MEDICINE | Facility: CLINIC | Age: 65
End: 2020-01-21
Payer: COMMERCIAL

## 2020-01-21 VITALS
TEMPERATURE: 96 F | WEIGHT: 269.81 LBS | DIASTOLIC BLOOD PRESSURE: 65 MMHG | OXYGEN SATURATION: 98 % | SYSTOLIC BLOOD PRESSURE: 110 MMHG | RESPIRATION RATE: 16 BRPM | BODY MASS INDEX: 43.36 KG/M2 | HEIGHT: 66 IN | HEART RATE: 90 BPM

## 2020-01-21 DIAGNOSIS — E07.9 THYROID DISEASE: ICD-10-CM

## 2020-01-21 DIAGNOSIS — H10.32 ACUTE CONJUNCTIVITIS OF LEFT EYE, UNSPECIFIED ACUTE CONJUNCTIVITIS TYPE: Primary | ICD-10-CM

## 2020-01-21 PROBLEM — H10.31 ACUTE CONJUNCTIVITIS OF RIGHT EYE: Status: ACTIVE | Noted: 2020-01-21

## 2020-01-21 PROCEDURE — 3008F BODY MASS INDEX DOCD: CPT | Mod: CPTII,S$GLB,, | Performed by: NURSE PRACTITIONER

## 2020-01-21 PROCEDURE — 99999 PR PBB SHADOW E&M-EST. PATIENT-LVL IV: ICD-10-PCS | Mod: PBBFAC,,, | Performed by: NURSE PRACTITIONER

## 2020-01-21 PROCEDURE — 99999 PR PBB SHADOW E&M-EST. PATIENT-LVL IV: CPT | Mod: PBBFAC,,, | Performed by: NURSE PRACTITIONER

## 2020-01-21 PROCEDURE — 3008F PR BODY MASS INDEX (BMI) DOCUMENTED: ICD-10-PCS | Mod: CPTII,S$GLB,, | Performed by: NURSE PRACTITIONER

## 2020-01-21 PROCEDURE — 3078F PR MOST RECENT DIASTOLIC BLOOD PRESSURE < 80 MM HG: ICD-10-PCS | Mod: CPTII,S$GLB,, | Performed by: NURSE PRACTITIONER

## 2020-01-21 PROCEDURE — 3074F SYST BP LT 130 MM HG: CPT | Mod: CPTII,S$GLB,, | Performed by: NURSE PRACTITIONER

## 2020-01-21 PROCEDURE — 3078F DIAST BP <80 MM HG: CPT | Mod: CPTII,S$GLB,, | Performed by: NURSE PRACTITIONER

## 2020-01-21 PROCEDURE — 3074F PR MOST RECENT SYSTOLIC BLOOD PRESSURE < 130 MM HG: ICD-10-PCS | Mod: CPTII,S$GLB,, | Performed by: NURSE PRACTITIONER

## 2020-01-21 PROCEDURE — 99213 PR OFFICE/OUTPT VISIT, EST, LEVL III, 20-29 MIN: ICD-10-PCS | Mod: S$GLB,,, | Performed by: NURSE PRACTITIONER

## 2020-01-21 PROCEDURE — 99213 OFFICE O/P EST LOW 20 MIN: CPT | Mod: S$GLB,,, | Performed by: NURSE PRACTITIONER

## 2020-01-21 RX ORDER — LEVOTHYROXINE SODIUM 25 UG/1
25 TABLET ORAL DAILY
Qty: 90 TABLET | Refills: 0 | Status: SHIPPED | OUTPATIENT
Start: 2020-01-21 | End: 2020-10-27 | Stop reason: SDUPTHER

## 2020-01-21 RX ORDER — ERYTHROMYCIN 5 MG/G
OINTMENT OPHTHALMIC 3 TIMES DAILY
Qty: 3.5 G | Refills: 0 | Status: SHIPPED | OUTPATIENT
Start: 2020-01-21 | End: 2020-01-26

## 2020-01-21 NOTE — ASSESSMENT & PLAN NOTE
OTC antihistamine for itching and erythema.   Tylenol/ibuprofen for pain.   Abx ointment for infection  Hand hygiene.   Warm compresses.

## 2020-01-21 NOTE — PROGRESS NOTES
Subjective:       Patient ID: Concepcion Don is a 64 y.o. female.    Chief Complaint: Conjunctivitis; Otalgia; and Sore Throat    Ms. Don is here today complaining of L sided conjunctivitis, otalgia, and sore throat.  The symptoms of started the past 2 days, but seem to be getting worse.    Conjunctivitis   This is a new problem. The current episode started in the past 7 days. The problem occurs constantly. The problem has been gradually worsening. Associated symptoms include a sore throat. Pertinent negatives include no abdominal pain, arthralgias, chest pain, chills, congestion, coughing, fatigue, fever, headaches, myalgias, nausea, numbness, urinary symptoms or vomiting. She has tried nothing for the symptoms.       Patient Active Problem List   Diagnosis    Hypertension    Delayed immunizations    Postmenopausal    Thyroid disease    Screening mammogram, encounter for    Chronic pain of left knee    Allergic rhinitis    Colon polyps    Encounter for long-term (current) use of medications    Other hyperlipidemia    Positive D dimer    Localized edema    Morbid obesity with BMI of 40.0-44.9, adult    Calcaneal spur of left foot    Cellulitis    Viral upper respiratory tract infection    Acute conjunctivitis of left eye       Family History   Problem Relation Age of Onset    Kidney disease Mother     Hypertension Mother     Diabetes Mother     Hypertension Father     Stroke Father     Lupus Sister     Hypertension Maternal Grandmother     Hypertension Maternal Grandfather     Hypertension Paternal Grandmother     Hypertension Paternal Grandfather      Past Surgical History:   Procedure Laterality Date    cataract surgery  2019    Left eye     SECTION, CLASSIC      HYSTERECTOMY      KNEE SURGERY      repleacement right knee    ROTATOR CUFF REPAIR Right          Current Outpatient Medications:     acetaminophen (TYLENOL) 500 MG tablet, Take 1 tablet (500 mg total) by  mouth every 6 (six) hours as needed for Pain., Disp: , Rfl: 0    aspirin (ECOTRIN) 81 MG EC tablet, Take 81 mg by mouth once daily., Disp: , Rfl:     atorvastatin (LIPITOR) 20 MG tablet, Take 1 tablet (20 mg total) by mouth once daily., Disp: 90 tablet, Rfl: 1    b complex vitamins tablet, Take 1 tablet by mouth once daily., Disp: , Rfl:     benazepril-hydrochlorthiazide (LOTENSIN HCT) 20-12.5 mg per tablet, Take 1 tablet by mouth once daily., Disp: 90 tablet, Rfl: 4    DOCUSATE CALCIUM (STOOL SOFTENER ORAL), Take 1 tablet by mouth once daily. , Disp: , Rfl:     fluticasone propionate (FLONASE) 50 mcg/actuation nasal spray, 1 spray (50 mcg total) by Each Nostril route once daily., Disp: 16 g, Rfl: 5    meloxicam (MOBIC) 15 MG tablet, Take 15 mg by mouth once daily., Disp: , Rfl: 6    multivitamin capsule, Take 1 capsule by mouth once daily., Disp: , Rfl:     mupirocin (BACTROBAN) 2 % ointment, Apply topically 3 (three) times daily., Disp: 30 g, Rfl: 0    psyllium seed, with dextrose, (FIBER ORAL), Take by mouth once daily., Disp: , Rfl:     triamcinolone acetonide 0.5% (KENALOG) 0.5 % Crea, Apply topically 2 (two) times daily., Disp: 30 g, Rfl: 0    erythromycin (ROMYCIN) ophthalmic ointment, Place into the right eye 3 (three) times daily. for 5 days, Disp: 3.5 g, Rfl: 0    levothyroxine (SYNTHROID) 25 MCG tablet, Take 1 tablet (25 mcg total) by mouth once daily., Disp: 90 tablet, Rfl: 0    Review of Systems   Constitutional: Negative for appetite change, chills, fatigue and fever.   HENT: Positive for ear pain (left) and sore throat. Negative for congestion, postnasal drip, rhinorrhea, sinus pressure, sinus pain, sneezing and trouble swallowing.    Eyes: Positive for pain, discharge, redness (left) and itching. Negative for photophobia and visual disturbance.   Respiratory: Negative for cough, chest tightness, shortness of breath and stridor.    Cardiovascular: Negative for chest pain and palpitations.  "  Gastrointestinal: Negative for abdominal pain, diarrhea, nausea and vomiting.   Musculoskeletal: Negative for arthralgias, back pain and myalgias.   Skin: Negative for color change and pallor.   Neurological: Negative for dizziness, light-headedness, numbness and headaches.       Objective:   /65 (BP Location: Left arm, Patient Position: Sitting, BP Method: Large (Automatic))   Pulse 90   Temp 96.1 °F (35.6 °C) (Tympanic)   Resp 16   Ht 5' 6" (1.676 m)   Wt 122.4 kg (269 lb 13.5 oz)   SpO2 98%   BMI 43.55 kg/m²      Physical Exam   Constitutional: She appears well-developed and well-nourished. She does not appear ill. No distress.   HENT:   Head: Normocephalic and atraumatic.   Right Ear: Tympanic membrane is not injected and not erythematous. A middle ear effusion is present.   Left Ear: Tympanic membrane is not injected and not erythematous. A middle ear effusion is present.   Nose: Mucosal edema present. No rhinorrhea. Right sinus exhibits no maxillary sinus tenderness and no frontal sinus tenderness. Left sinus exhibits no maxillary sinus tenderness and no frontal sinus tenderness.   Mouth/Throat: Uvula is midline, oropharynx is clear and moist and mucous membranes are normal. No oropharyngeal exudate, posterior oropharyngeal edema or posterior oropharyngeal erythema.   Eyes: Pupils are equal, round, and reactive to light. Right eye exhibits no discharge and no exudate. Left eye exhibits discharge. Right conjunctiva is not injected. Left conjunctiva is injected.   Cardiovascular: Normal rate, regular rhythm, normal heart sounds and intact distal pulses. Exam reveals no gallop and no friction rub.   No murmur heard.  Neurological: She is alert.   Skin: She is not diaphoretic.       Assessment & Plan     Problem List Items Addressed This Visit        Ophtho    Acute conjunctivitis of left eye - Primary    Current Assessment & Plan     OTC antihistamine for itching and erythema.   Tylenol/ibuprofen " for pain.   Abx ointment for infection  Hand hygiene.   Warm compresses.           Relevant Medications    erythromycin (ROMYCIN) ophthalmic ointment        Follow up if symptoms worsen or fail to improve.

## 2020-03-13 ENCOUNTER — OFFICE VISIT (OUTPATIENT)
Dept: INTERNAL MEDICINE | Facility: CLINIC | Age: 65
End: 2020-03-13
Payer: COMMERCIAL

## 2020-03-13 VITALS
HEART RATE: 80 BPM | RESPIRATION RATE: 19 BRPM | OXYGEN SATURATION: 98 % | WEIGHT: 266.56 LBS | DIASTOLIC BLOOD PRESSURE: 74 MMHG | BODY MASS INDEX: 42.84 KG/M2 | HEIGHT: 66 IN | SYSTOLIC BLOOD PRESSURE: 124 MMHG | TEMPERATURE: 97 F

## 2020-03-13 DIAGNOSIS — M79.604 PAIN OF RIGHT LOWER EXTREMITY: ICD-10-CM

## 2020-03-13 DIAGNOSIS — M25.551 ACUTE PAIN OF RIGHT HIP: Primary | ICD-10-CM

## 2020-03-13 PROCEDURE — 3078F DIAST BP <80 MM HG: CPT | Mod: CPTII,S$GLB,, | Performed by: NURSE PRACTITIONER

## 2020-03-13 PROCEDURE — 3008F BODY MASS INDEX DOCD: CPT | Mod: CPTII,S$GLB,, | Performed by: NURSE PRACTITIONER

## 2020-03-13 PROCEDURE — 99999 PR PBB SHADOW E&M-EST. PATIENT-LVL IV: ICD-10-PCS | Mod: PBBFAC,,, | Performed by: NURSE PRACTITIONER

## 2020-03-13 PROCEDURE — 3074F SYST BP LT 130 MM HG: CPT | Mod: CPTII,S$GLB,, | Performed by: NURSE PRACTITIONER

## 2020-03-13 PROCEDURE — 99214 PR OFFICE/OUTPT VISIT, EST, LEVL IV, 30-39 MIN: ICD-10-PCS | Mod: S$GLB,,, | Performed by: NURSE PRACTITIONER

## 2020-03-13 PROCEDURE — 3008F PR BODY MASS INDEX (BMI) DOCUMENTED: ICD-10-PCS | Mod: CPTII,S$GLB,, | Performed by: NURSE PRACTITIONER

## 2020-03-13 PROCEDURE — 99214 OFFICE O/P EST MOD 30 MIN: CPT | Mod: S$GLB,,, | Performed by: NURSE PRACTITIONER

## 2020-03-13 PROCEDURE — 3074F PR MOST RECENT SYSTOLIC BLOOD PRESSURE < 130 MM HG: ICD-10-PCS | Mod: CPTII,S$GLB,, | Performed by: NURSE PRACTITIONER

## 2020-03-13 PROCEDURE — 99999 PR PBB SHADOW E&M-EST. PATIENT-LVL IV: CPT | Mod: PBBFAC,,, | Performed by: NURSE PRACTITIONER

## 2020-03-13 PROCEDURE — 3078F PR MOST RECENT DIASTOLIC BLOOD PRESSURE < 80 MM HG: ICD-10-PCS | Mod: CPTII,S$GLB,, | Performed by: NURSE PRACTITIONER

## 2020-03-13 RX ORDER — CYCLOBENZAPRINE HCL 5 MG
5 TABLET ORAL 3 TIMES DAILY PRN
Qty: 30 TABLET | Refills: 0 | Status: SHIPPED | OUTPATIENT
Start: 2020-03-13 | End: 2020-03-23

## 2020-03-13 NOTE — PATIENT INSTRUCTIONS
Arthralgia    Arthralgia is the term for pain in or around the joint. It is a symptom, not a disease. This pain may involve one or more joints. In some cases, the pain moves from joint to joint.  There are many causes for joint pain. These include:  · Injury  · Osteoarthritis (wearing out of the joint surface)  · Gout (inflammation of the joint due to crystals in the joint fluid)  · Infection inside the joint    · Bursitis (inflammation of the fluid-filled sacs around the joint)  · Autoimmune disorders such as rheumatoid arthritis or lupus  · Tendonitis (inflammation of chords that attach muscle to bone)  Home care  · Rest the involved joint(s) until your symptoms improve.   · You may be prescribed pain medicine. If none is prescribed, you may use acetaminophen or ibuprofen to control pain and inflammation.  Follow-up care  Follow up with your healthcare provider or as advised.  When to seek medical advice  Contact your healthcare provider right away if any of the following occurs:  · Pain, swelling, or redness of joint increases  · Pain worsens or recurs after a period of improvement  · Pain moves to other joints  · You cannot bear weight on the affected joint   · You cannot move the affected joint  · Joint appears deformed  · New rash appears  · Fever of 100.4ºF (38ºC) or higher, or as directed by your healthcare provider  Date Last Reviewed: 3/1/2017  © 4321-6337 [employer]. [address] [place] [address], [place] [id number]. All rights reserved. This information is not intended as a substitute for professional medical care. Always follow your healthcare professional's instructions.        Possible Causes of Low Back or Leg Pain    The symptoms in your back or leg may be due to pressure on a nerve. This pressure may be caused by a damaged disk or by abnormal bone growth. Either way, you may feel pain, burning, tingling, or numbness. If you have pressure on a nerve that connects to the sciatic nerve, pain  may shoot down your leg.    Pressure from the disk  Constant wear and tear can weaken a disk over time and cause back pain. The disk can then be damaged by a sudden movement or injury. If its soft center begins to bulge, the disk may press on a nerve. Or the outside of the disk may tear, and the soft center may squeeze through and pinch a nerve.    Pressure from bone  As a disk wears out, the vertebrae right above and below the disk begin to touch. This can put pressure on a nerve. Often, abnormal bone (called bone spurs) grows where the vertebrae rub against each other. This can cause the foramen or the spinal canal to narrow (called stenosis) and press against a nerve.  Date Last Reviewed: 10/4/2015  © 5124-7922 [employer]. [address] [place] [address], [place] [address]. All rights reserved. This information is not intended as a substitute for professional medical care. Always follow your healthcare professional's instructions.        Hip Strain    You have a strain of the muscles around the hip joint. A muscle strain is a stretching or tearing of muscle fibers. This causes pain, especially when you move that muscle. There may also be some swelling and bruising.  Home care  · Stay off the injured leg as much as possible until you can walk on it without pain. If you have a lot of pain with walking, crutches or a walker may be prescribed. These can be rented or purchased at many pharmacies and surgical or orthopedic supply stores. Follow your healthcare provider's advice regarding when to begin putting weight on that leg.  · Apply an ice pack over the injured area for 15 to 20 minutes every 3 to 6 hours. You should do this for the first 24 to 48 hours. You can make an ice pack by filling a plastic bag that seals at the top with ice cubes and then wrapping it with a thin towel. Be careful not to injure your skin with the ice treatments. Ice should never be applied directly to skin. Continue the use of ice  packs for relief of pain and swelling as needed. After 48 hours, apply heat (warm shower or warm bath) for 15 to 20 minutes several times a day, or alternate ice and heat.  · You may use over-the-counter pain medicine to control pain, unless another pain medicine was prescribed. If you have chronic liver or kidney disease or ever had a stomach ulcer or GI bleeding, talk with your healthcare provider beforeusing these medicines.  · If you play sports, you may resume these activities when you are able to hop and run on the injured leg without pain.  Follow-up care  Follow up with your healthcare provider, or as advised. If your symptoms do not begin to get better after a week, more tests may be needed.  If X-rays were taken, you will be told of any new findings that may affect your care.  When to seek medical advice  Call your healthcare provider right away if any of these occur:  · Increased swelling or bruising  · Increased pain  · Losing the ability to put weight on the injured side  Date Last Reviewed: 11/19/2015  © 4110-1981 [employer]. [address] [place] [address], [place] [address]. All rights reserved. This information is not intended as a substitute for professional medical care. Always follow your healthcare professional's instructions.

## 2020-03-13 NOTE — PROGRESS NOTES
Subjective:       Patient ID: Concepcion Don is a 64 y.o. female.    Chief Complaint: Leg Pain (right) and Numbness    Mrs. Don is here today complaining of right leg pain that is described as dull pain to the right hip, and tightness in the right calf.  She reports that she has intermittent feelings of numbness to her right lower leg that feels like leg is asleep.  She denies any back pain, or any tingling to extremities.  No specific known injury or fall, but thinks that this may be related to her lifting up on a pack of water on Monday.    Leg Pain    The incident occurred 12 to 24 hours ago. The incident occurred at home. There was no injury mechanism. The pain is present in the right hip and right leg. Quality: tightness in calf, dull in hip. The pain is at a severity of 2/10. The pain is mild. The pain has been constant since onset. Associated symptoms include numbness. Pertinent negatives include no inability to bear weight, loss of motion, loss of sensation, muscle weakness or tingling. She reports no foreign bodies present. The symptoms are aggravated by weight bearing. She has tried acetaminophen for the symptoms. The treatment provided mild relief.       Patient Active Problem List   Diagnosis    Hypertension    Delayed immunizations    Postmenopausal    Thyroid disease    Screening mammogram, encounter for    Chronic pain of left knee    Allergic rhinitis    Colon polyps    Encounter for long-term (current) use of medications    Other hyperlipidemia    Positive D dimer    Localized edema    Morbid obesity with BMI of 40.0-44.9, adult    Calcaneal spur of left foot    Cellulitis    Viral upper respiratory tract infection    Acute conjunctivitis of left eye    Musculoskeletal leg pain, right    Acute pain of right hip       Family History   Problem Relation Age of Onset    Kidney disease Mother     Hypertension Mother     Diabetes Mother     Hypertension Father     Stroke Father      Lupus Sister     Hypertension Maternal Grandmother     Hypertension Maternal Grandfather     Hypertension Paternal Grandmother     Hypertension Paternal Grandfather      Past Surgical History:   Procedure Laterality Date    cataract surgery  2019    Left eye     SECTION, CLASSIC      HYSTERECTOMY      KNEE SURGERY      repleacement right knee    ROTATOR CUFF REPAIR Right          Current Outpatient Medications:     acetaminophen (TYLENOL) 500 MG tablet, Take 1 tablet (500 mg total) by mouth every 6 (six) hours as needed for Pain., Disp: , Rfl: 0    aspirin (ECOTRIN) 81 MG EC tablet, Take 81 mg by mouth once daily., Disp: , Rfl:     atorvastatin (LIPITOR) 20 MG tablet, Take 1 tablet (20 mg total) by mouth once daily., Disp: 90 tablet, Rfl: 1    b complex vitamins tablet, Take 1 tablet by mouth once daily., Disp: , Rfl:     benazepril-hydrochlorthiazide (LOTENSIN HCT) 20-12.5 mg per tablet, Take 1 tablet by mouth once daily., Disp: 90 tablet, Rfl: 4    DOCUSATE CALCIUM (STOOL SOFTENER ORAL), Take 1 tablet by mouth once daily. , Disp: , Rfl:     fluticasone propionate (FLONASE) 50 mcg/actuation nasal spray, 1 spray (50 mcg total) by Each Nostril route once daily., Disp: 16 g, Rfl: 5    levothyroxine (SYNTHROID) 25 MCG tablet, Take 1 tablet (25 mcg total) by mouth once daily., Disp: 90 tablet, Rfl: 0    meloxicam (MOBIC) 15 MG tablet, Take 15 mg by mouth once daily., Disp: , Rfl: 6    multivitamin capsule, Take 1 capsule by mouth once daily., Disp: , Rfl:     mupirocin (BACTROBAN) 2 % ointment, Apply topically 3 (three) times daily., Disp: 30 g, Rfl: 0    psyllium seed, with dextrose, (FIBER ORAL), Take by mouth once daily., Disp: , Rfl:     triamcinolone acetonide 0.5% (KENALOG) 0.5 % Crea, Apply topically 2 (two) times daily., Disp: 30 g, Rfl: 0    cyclobenzaprine (FLEXERIL) 5 MG tablet, Take 1 tablet (5 mg total) by mouth 3 (three) times daily as needed for Muscle spasms.,  "Disp: 30 tablet, Rfl: 0    Review of Systems   Constitutional: Negative for chills, fatigue and fever.   Respiratory: Negative for cough, chest tightness, shortness of breath and wheezing.    Cardiovascular: Negative for chest pain, palpitations and leg swelling.   Gastrointestinal: Negative for abdominal pain, nausea and vomiting.   Genitourinary: Negative for dysuria, flank pain and frequency.   Musculoskeletal: Positive for arthralgias and myalgias. Negative for gait problem and joint swelling.   Neurological: Positive for numbness. Negative for dizziness, tingling, light-headedness and headaches.       Objective:   /74 (BP Location: Left arm, Patient Position: Sitting, BP Method: X-Large (Automatic))   Pulse 80   Temp 96.8 °F (36 °C) (Tympanic)   Resp 19   Ht 5' 6" (1.676 m)   Wt 120.9 kg (266 lb 8.6 oz)   SpO2 98%   BMI 43.02 kg/m²      Physical Exam   Constitutional: She is oriented to person, place, and time. She appears well-developed and well-nourished. No distress.   Cardiovascular: Normal rate, regular rhythm, normal heart sounds and intact distal pulses. Exam reveals no gallop and no friction rub.   No murmur heard.  Pulmonary/Chest: Effort normal and breath sounds normal. No respiratory distress. She has no wheezes.   Musculoskeletal: Normal range of motion. She exhibits tenderness. She exhibits no edema.   Mild tenderness noted to the lateral buttocks region to thigh.   Neurological: She is alert and oriented to person, place, and time. No cranial nerve deficit.   Skin: Skin is warm and dry. She is not diaphoretic. No erythema.       Assessment & Plan     Problem List Items Addressed This Visit        Orthopedic    Acute pain of right hip - Primary    Current Assessment & Plan     Symptoms most likely secondary to muscular strain from a lifting possible water.  No red flag signs for DVT.  Did discuss signs and symptoms of DVT for patient to be aware of and when to return to clinic or go to " ED.  Will try a trial of muscle relaxer for symptomatic relief.  Also recommended RICE treatment and heating pad to see if symptoms improve.         Relevant Medications    cyclobenzaprine (FLEXERIL) 5 MG tablet      Other Visit Diagnoses     Pain of right lower extremity        Relevant Medications    cyclobenzaprine (FLEXERIL) 5 MG tablet        Follow up if symptoms worsen or fail to improve.

## 2020-03-19 PROBLEM — M25.551 ACUTE PAIN OF RIGHT HIP: Status: ACTIVE | Noted: 2020-03-19

## 2020-03-19 NOTE — ASSESSMENT & PLAN NOTE
Symptoms most likely secondary to muscular strain from a lifting possible water.  No red flag signs for DVT.  Did discuss signs and symptoms of DVT for patient to be aware of and when to return to clinic or go to ED.  Will try a trial of muscle relaxer for symptomatic relief.  Also recommended RICE treatment and heating pad to see if symptoms improve.

## 2020-03-20 ENCOUNTER — HOSPITAL ENCOUNTER (OUTPATIENT)
Dept: RADIOLOGY | Facility: HOSPITAL | Age: 65
Discharge: HOME | End: 2020-03-20
Attending: FAMILY MEDICINE
Payer: COMMERCIAL

## 2020-03-20 ENCOUNTER — OFFICE VISIT (OUTPATIENT)
Dept: INTERNAL MEDICINE | Facility: CLINIC | Age: 65
End: 2020-03-20
Payer: COMMERCIAL

## 2020-03-20 ENCOUNTER — TELEPHONE (OUTPATIENT)
Dept: INTERNAL MEDICINE | Facility: CLINIC | Age: 65
End: 2020-03-20

## 2020-03-20 VITALS
SYSTOLIC BLOOD PRESSURE: 120 MMHG | TEMPERATURE: 98 F | RESPIRATION RATE: 18 BRPM | OXYGEN SATURATION: 98 % | WEIGHT: 264.75 LBS | HEIGHT: 66 IN | HEART RATE: 96 BPM | DIASTOLIC BLOOD PRESSURE: 74 MMHG | BODY MASS INDEX: 42.55 KG/M2

## 2020-03-20 DIAGNOSIS — M54.41 RIGHT-SIDED LOW BACK PAIN WITH RIGHT-SIDED SCIATICA, UNSPECIFIED CHRONICITY: Primary | ICD-10-CM

## 2020-03-20 DIAGNOSIS — E07.9 THYROID DISEASE: ICD-10-CM

## 2020-03-20 DIAGNOSIS — M47.816 FACET HYPERTROPHY OF LUMBAR REGION: ICD-10-CM

## 2020-03-20 DIAGNOSIS — I10 ESSENTIAL HYPERTENSION: ICD-10-CM

## 2020-03-20 DIAGNOSIS — M54.41 RIGHT-SIDED LOW BACK PAIN WITH RIGHT-SIDED SCIATICA, UNSPECIFIED CHRONICITY: ICD-10-CM

## 2020-03-20 DIAGNOSIS — M43.16 SPONDYLOLISTHESIS OF LUMBAR REGION: Primary | ICD-10-CM

## 2020-03-20 PROCEDURE — 3008F PR BODY MASS INDEX (BMI) DOCUMENTED: ICD-10-PCS | Mod: CPTII,S$GLB,, | Performed by: FAMILY MEDICINE

## 2020-03-20 PROCEDURE — 99999 PR PBB SHADOW E&M-EST. PATIENT-LVL IV: ICD-10-PCS | Mod: PBBFAC,,, | Performed by: FAMILY MEDICINE

## 2020-03-20 PROCEDURE — 99214 PR OFFICE/OUTPT VISIT, EST, LEVL IV, 30-39 MIN: ICD-10-PCS | Mod: S$GLB,,, | Performed by: FAMILY MEDICINE

## 2020-03-20 PROCEDURE — 72100 X-RAY EXAM L-S SPINE 2/3 VWS: CPT | Mod: TC,PO

## 2020-03-20 PROCEDURE — 3008F BODY MASS INDEX DOCD: CPT | Mod: CPTII,S$GLB,, | Performed by: FAMILY MEDICINE

## 2020-03-20 PROCEDURE — 72100 XR LUMBAR SPINE AP AND LATERAL: ICD-10-PCS | Mod: 26,,, | Performed by: RADIOLOGY

## 2020-03-20 PROCEDURE — 3078F DIAST BP <80 MM HG: CPT | Mod: CPTII,S$GLB,, | Performed by: FAMILY MEDICINE

## 2020-03-20 PROCEDURE — 3074F PR MOST RECENT SYSTOLIC BLOOD PRESSURE < 130 MM HG: ICD-10-PCS | Mod: CPTII,S$GLB,, | Performed by: FAMILY MEDICINE

## 2020-03-20 PROCEDURE — 99999 PR PBB SHADOW E&M-EST. PATIENT-LVL IV: CPT | Mod: PBBFAC,,, | Performed by: FAMILY MEDICINE

## 2020-03-20 PROCEDURE — 99214 OFFICE O/P EST MOD 30 MIN: CPT | Mod: S$GLB,,, | Performed by: FAMILY MEDICINE

## 2020-03-20 PROCEDURE — 72100 X-RAY EXAM L-S SPINE 2/3 VWS: CPT | Mod: 26,,, | Performed by: RADIOLOGY

## 2020-03-20 PROCEDURE — 3078F PR MOST RECENT DIASTOLIC BLOOD PRESSURE < 80 MM HG: ICD-10-PCS | Mod: CPTII,S$GLB,, | Performed by: FAMILY MEDICINE

## 2020-03-20 PROCEDURE — 3074F SYST BP LT 130 MM HG: CPT | Mod: CPTII,S$GLB,, | Performed by: FAMILY MEDICINE

## 2020-03-20 NOTE — TELEPHONE ENCOUNTER
Xray results given to patient.     She would like a referral to [facility] in [place]. Can you do a referral for PT?     Thank you.

## 2020-03-20 NOTE — ASSESSMENT & PLAN NOTE
-initially symptoms started with back pain with numbness that radiated down her right lower extremity.  Back pain nearly resolved still has some numbness.  The symptoms occurred after reaching over to  case of water.  Will order x-ray to evaluate for any degenerative joint disease or possible disc herniation or any abnormalities that could be contributing to patient's symptoms

## 2020-03-20 NOTE — PROGRESS NOTES
Patient ID: Concepcion Don is a 64 y.o. female.    Chief Complaint: Follow up right leg numbness and Establish Care    HPI Patient is 64-year-old female here to follow-up right leg numbness.  Initially presented 1 week ago with complaint of right leg numbness that began after bending over to  case of water.  Described numbness that began ay back radiated down her leg.  Says numbness is now below her knee extending to toes.  Did have right knee replacement 2006 and at baseline has some numbness of her lower medial leg however new numbness is right lower lateral leg extending to toes.  Numbness worse when she 1st awakens and with sitting. Improved with walking.  Non extremity swelling or erythema.      Family History   Problem Relation Age of Onset    Kidney disease Mother     Hypertension Mother     Diabetes Mother     Hypertension Father     Stroke Father     Lupus Sister     Hypertension Maternal Grandmother     Hypertension Maternal Grandfather     Hypertension Paternal Grandmother     Hypertension Paternal Grandfather        Current Outpatient Medications:     acetaminophen (TYLENOL) 500 MG tablet, Take 1 tablet (500 mg total) by mouth every 6 (six) hours as needed for Pain., Disp: , Rfl: 0    aspirin (ECOTRIN) 81 MG EC tablet, Take 81 mg by mouth once daily., Disp: , Rfl:     atorvastatin (LIPITOR) 20 MG tablet, Take 1 tablet (20 mg total) by mouth once daily., Disp: 90 tablet, Rfl: 1    b complex vitamins tablet, Take 1 tablet by mouth once daily., Disp: , Rfl:     benazepril-hydrochlorthiazide (LOTENSIN HCT) 20-12.5 mg per tablet, Take 1 tablet by mouth once daily., Disp: 90 tablet, Rfl: 4    cyclobenzaprine (FLEXERIL) 5 MG tablet, Take 1 tablet (5 mg total) by mouth 3 (three) times daily as needed for Muscle spasms., Disp: 30 tablet, Rfl: 0    DOCUSATE CALCIUM (STOOL SOFTENER ORAL), Take 1 tablet by mouth once daily. , Disp: , Rfl:     fluticasone propionate (FLONASE) 50 mcg/actuation  "nasal spray, 1 spray (50 mcg total) by Each Nostril route once daily., Disp: 16 g, Rfl: 5    levothyroxine (SYNTHROID) 25 MCG tablet, Take 1 tablet (25 mcg total) by mouth once daily., Disp: 90 tablet, Rfl: 0    meloxicam (MOBIC) 15 MG tablet, Take 15 mg by mouth once daily., Disp: , Rfl: 6    multivitamin capsule, Take 1 capsule by mouth once daily., Disp: , Rfl:     mupirocin (BACTROBAN) 2 % ointment, Apply topically 3 (three) times daily., Disp: 30 g, Rfl: 0    psyllium seed, with dextrose, (FIBER ORAL), Take by mouth once daily., Disp: , Rfl:     triamcinolone acetonide 0.5% (KENALOG) 0.5 % Crea, Apply topically 2 (two) times daily., Disp: 30 g, Rfl: 0    Review of Systems   Constitutional: Negative for activity change.   Musculoskeletal: Negative for back pain, gait problem and joint swelling.   Neurological: Positive for numbness. Negative for weakness.       Objective:   /74 (BP Location: Left arm, Patient Position: Sitting, BP Method: Large (Automatic))   Pulse 96   Temp 98.4 °F (36.9 °C) (Oral)   Resp 18   Ht 5' 6" (1.676 m)   Wt 120.1 kg (264 lb 12.4 oz)   SpO2 98%   BMI 42.74 kg/m²      Physical Exam   Constitutional: She appears well-developed and well-nourished. No distress.   Cardiovascular: Normal rate, regular rhythm and normal heart sounds.   Pulmonary/Chest: Effort normal and breath sounds normal.   Musculoskeletal: She exhibits no edema, tenderness or deformity.   Bilateral calf circumference 40 cm, sensation to microfilament decreased right fibula to toes. Decreased sensation (tibial region is baseline). No LE edema or erythema, negative lumbar ttp. +straight leg test (right side)   Neurological: A sensory deficit is present. She exhibits normal muscle tone. Coordination normal.   Bilateral LE strength 5/5   Skin: She is not diaphoretic.       Assessment & Plan     Problem List Items Addressed This Visit        Cardiac/Vascular    Hypertension    Current Assessment & Plan     " -continue current medication (benazepril-HCTZ). blood pressure controlled            Endocrine    Thyroid disease    Current Assessment & Plan     -continue Synthroid            Orthopedic    Right-sided low back pain with right-sided sciatica - Primary    Current Assessment & Plan     -initially symptoms started with back pain with numbness that radiated down her right lower extremity.  Back pain nearly resolved still has some numbness.  The symptoms occurred after reaching over to  case of water.  Will order x-ray to evaluate for any degenerative joint disease or possible disc herniation or any abnormalities that could be contributing to patient's symptoms         Relevant Orders    X-Ray Lumbar Spine AP And Lateral (Completed)            Disclaimer:  This note may have been prepared using voice recognition software, without extensive proofreading. As such, there could be errors within the text such as sound alike errors.

## 2020-03-20 NOTE — TELEPHONE ENCOUNTER
----- Message from Mansoor Sylvester sent at 3/20/2020  4:01 PM CDT -----  Contact: Self- [phone]  .Type:  Test Results    Who Called: Concepcion Don  Name of Test (Lab/Mammo/Etc): Xray   Date of Test: 3/20/20  Ordering Provider:    Where the test was performed: [facility]   Would the patient rather a call back or a response via [facility]? Call back   Best Call Back Number: .[phone] (home)   Additional Information:      Thank You,   Mansoor Sylvester

## 2020-04-23 ENCOUNTER — PATIENT MESSAGE (OUTPATIENT)
Dept: INTERNAL MEDICINE | Facility: CLINIC | Age: 65
End: 2020-04-23

## 2020-04-24 NOTE — TELEPHONE ENCOUNTER
Per patient My Chart message: For several years now, I have not  been able to smell.   Occasionally I had been able to smell some things but not all.  Over the past couple of weeks I have been able to smell even less.  I am also having a problem with my sense of taste.  The only thing I can sometimes taste is salt and sugar.   This is not all the time.  My question is, is there something that can be done to regain these senses.  Your response is appreciated

## 2020-04-27 ENCOUNTER — OFFICE VISIT (OUTPATIENT)
Dept: INTERNAL MEDICINE | Facility: CLINIC | Age: 65
End: 2020-04-27
Payer: COMMERCIAL

## 2020-04-27 DIAGNOSIS — J06.9 VIRAL UPPER RESPIRATORY TRACT INFECTION: ICD-10-CM

## 2020-04-27 DIAGNOSIS — R43.9 SMELL AND TASTE DISORDER: Primary | ICD-10-CM

## 2020-04-27 PROCEDURE — 99213 OFFICE O/P EST LOW 20 MIN: CPT | Mod: 95,,, | Performed by: FAMILY MEDICINE

## 2020-04-27 PROCEDURE — 99213 PR OFFICE/OUTPT VISIT, EST, LEVL III, 20-29 MIN: ICD-10-PCS | Mod: 95,,, | Performed by: FAMILY MEDICINE

## 2020-04-27 RX ORDER — FLUTICASONE PROPIONATE 50 MCG
1 SPRAY, SUSPENSION (ML) NASAL DAILY
Qty: 16 G | Refills: 5 | Status: SHIPPED | OUTPATIENT
Start: 2020-04-27 | End: 2021-08-13 | Stop reason: SDUPTHER

## 2020-04-27 RX ORDER — CETIRIZINE HYDROCHLORIDE 10 MG/1
10 TABLET ORAL NIGHTLY
Qty: 90 TABLET | Refills: 1 | Status: SHIPPED | OUTPATIENT
Start: 2020-04-27 | End: 2021-08-13 | Stop reason: SDUPTHER

## 2020-04-27 NOTE — ASSESSMENT & PLAN NOTE
Suspect longstanding issues with smell and taste being secondary to chronic allergies.  Recommended referral to ENT but also encouraged her to switch to Zyrtec started using Flonase on a daily basis.  Demonstrated instructions on how to use Flonase to where it is not as irritating.

## 2020-04-27 NOTE — PROGRESS NOTES
Subjective:       Patient ID: Concepcion Don is a 64 y.o. female.    The patient location is: [place]  The chief complaint leading to consultation is: Sinus Problem    Visit type: audiovisual  Total time spent with patient: 8 minutes  Each patient to whom he or she provides medical services by telemedicine is:  (1) informed of the relationship between the physician and patient and the respective role of any other health care provider with respect to management of the patient; and (2) notified that he or she may decline to receive medical services by telemedicine and may withdraw from such care at any time.    HPI    Seen via video visit today to discuss chronic symptoms of decreased smell and taste that have been going on for more than 2 years now.  Says that actually her sense of taste is a little bit worse over the last couple of weeks.  using claritin for a while.  Says that she can detect whether something salty or sweet otherwise does not too much ability to distinguish more subtle Flavors.    Saw ENT about 2 years ago.  Says that she was not really given any recommendations.  I also inquired whether she has used any nasal spray.  She has had Flonase in the past but never used it consistently.    Family History   Problem Relation Age of Onset    Kidney disease Mother     Hypertension Mother     Diabetes Mother     Hypertension Father     Stroke Father     Lupus Sister     Hypertension Maternal Grandmother     Hypertension Maternal Grandfather     Hypertension Paternal Grandmother     Hypertension Paternal Grandfather        Current Outpatient Medications:     acetaminophen (TYLENOL) 500 MG tablet, Take 1 tablet (500 mg total) by mouth every 6 (six) hours as needed for Pain., Disp: , Rfl: 0    aspirin (ECOTRIN) 81 MG EC tablet, Take 81 mg by mouth once daily., Disp: , Rfl:     atorvastatin (LIPITOR) 20 MG tablet, Take 1 tablet (20 mg total) by mouth once daily., Disp: 90 tablet, Rfl: 1    b complex  vitamins tablet, Take 1 tablet by mouth once daily., Disp: , Rfl:     benazepril-hydrochlorthiazide (LOTENSIN HCT) 20-12.5 mg per tablet, Take 1 tablet by mouth once daily., Disp: 90 tablet, Rfl: 4    cetirizine (ZYRTEC) 10 MG tablet, Take 1 tablet (10 mg total) by mouth every evening., Disp: 90 tablet, Rfl: 1    DOCUSATE CALCIUM (STOOL SOFTENER ORAL), Take 1 tablet by mouth once daily. , Disp: , Rfl:     fluticasone propionate (FLONASE) 50 mcg/actuation nasal spray, 1 spray (50 mcg total) by Each Nostril route once daily., Disp: 16 g, Rfl: 5    levothyroxine (SYNTHROID) 25 MCG tablet, Take 1 tablet (25 mcg total) by mouth once daily., Disp: 90 tablet, Rfl: 0    meloxicam (MOBIC) 15 MG tablet, Take 15 mg by mouth once daily., Disp: , Rfl: 6    multivitamin capsule, Take 1 capsule by mouth once daily., Disp: , Rfl:     mupirocin (BACTROBAN) 2 % ointment, Apply topically 3 (three) times daily., Disp: 30 g, Rfl: 0    psyllium seed, with dextrose, (FIBER ORAL), Take by mouth once daily., Disp: , Rfl:     triamcinolone acetonide 0.5% (KENALOG) 0.5 % Crea, Apply topically 2 (two) times daily., Disp: 30 g, Rfl: 0    Review of Systems   Constitutional: Negative for chills and fever.   Respiratory: Negative for cough and shortness of breath.    Cardiovascular: Negative for chest pain.   Gastrointestinal: Negative for abdominal pain.   Skin: Negative for rash.   Neurological: Negative for dizziness.       Objective:   There were no vitals taken for this visit.     Physical Exam   Constitutional: She is oriented to person, place, and time. She appears well-developed and well-nourished. No distress.   HENT:   Head: Normocephalic.   Pulmonary/Chest: Effort normal. No respiratory distress.   Neurological: She is alert and oriented to person, place, and time.   Psychiatric: She has a normal mood and affect. Her behavior is normal.       Assessment & Plan     Problem List Items Addressed This Visit        Neuro    Smell  and taste disorder - Primary    Current Assessment & Plan     Suspect longstanding issues with smell and taste being secondary to chronic allergies.  Recommended referral to ENT but also encouraged her to switch to Zyrtec started using Flonase on a daily basis.  Demonstrated instructions on how to use Flonase to where it is not as irritating.         Relevant Orders    Ambulatory referral/consult to ENT       ENT    Viral upper respiratory tract infection    Relevant Medications    fluticasone propionate (FLONASE) 50 mcg/actuation nasal spray            No follow-ups on file.    Disclaimer:  This note may have been prepared using voice recognition software, it may have not been extensively proofed, as such there could be errors within the text such as sound alike errors.

## 2020-07-31 ENCOUNTER — TELEPHONE (OUTPATIENT)
Dept: INTERNAL MEDICINE | Facility: CLINIC | Age: 65
End: 2020-07-31

## 2020-07-31 NOTE — TELEPHONE ENCOUNTER
Good morning. Patient missed her 4/30/20 appointment with Dr. Mccartney. She states that she was unaware of this appointment. She is looking to reschedule. Will Dr. Nayak have to place another referral do she can be scheduled? I've tried to schedule her, but was unsuccessful with doing so.

## 2020-08-04 ENCOUNTER — PATIENT OUTREACH (OUTPATIENT)
Dept: ADMINISTRATIVE | Facility: OTHER | Age: 65
End: 2020-08-04

## 2020-08-05 ENCOUNTER — OFFICE VISIT (OUTPATIENT)
Dept: OTOLARYNGOLOGY | Facility: CLINIC | Age: 65
End: 2020-08-05
Payer: COMMERCIAL

## 2020-08-05 VITALS
DIASTOLIC BLOOD PRESSURE: 88 MMHG | TEMPERATURE: 98 F | WEIGHT: 269.81 LBS | HEART RATE: 84 BPM | BODY MASS INDEX: 43.55 KG/M2 | SYSTOLIC BLOOD PRESSURE: 137 MMHG

## 2020-08-05 DIAGNOSIS — R43.0 ANOSMIA: Primary | ICD-10-CM

## 2020-08-05 DIAGNOSIS — J34.2 NASAL SEPTAL DEVIATION: ICD-10-CM

## 2020-08-05 PROCEDURE — 99243 OFF/OP CNSLTJ NEW/EST LOW 30: CPT | Mod: S$GLB,,, | Performed by: PHYSICIAN ASSISTANT

## 2020-08-05 PROCEDURE — 99999 PR PBB SHADOW E&M-EST. PATIENT-LVL III: ICD-10-PCS | Mod: PBBFAC,,, | Performed by: PHYSICIAN ASSISTANT

## 2020-08-05 PROCEDURE — 99243 PR OFFICE CONSULTATION,LEVEL III: ICD-10-PCS | Mod: S$GLB,,, | Performed by: PHYSICIAN ASSISTANT

## 2020-08-05 PROCEDURE — 99999 PR PBB SHADOW E&M-EST. PATIENT-LVL III: CPT | Mod: PBBFAC,,, | Performed by: PHYSICIAN ASSISTANT

## 2020-08-05 NOTE — LETTER
August 6, 2020      Colin Mcintyre,   [address] [address] [address]           [facility]  [address] [facility] [address]  Phone: [phone]  Fax: [phone]          Patient: Concepcion Don   MR Number: [id number]   YOB: 1955   Date of Visit: 8/5/2020       Dear Dr. Colin Mcintyre:    Thank you for referring Concepcion Don to me for evaluation. Attached you will find relevant portions of my assessment and plan of care.    If you have questions, please do not hesitate to call me. I look forward to following Concepcion Don along with you.    Sincerely,    Tami Rhodes PA-C    Enclosure  CC:  No Recipients    If you would like to receive this communication electronically, please contact [email or web address] or [phone] to request more information on [employer] access.    For providers and/or their staff who would like to refer a patient to [facility], please contact us through our one-stop-shop provider referral line, Dave Espinosa, at [phone].    If you feel you have received this communication in error or would no longer like to receive these types of communications, please e-mail [email or web address]

## 2020-08-06 NOTE — PROGRESS NOTES
Subjective:       Patient ID: Concepcion Don is a 65 y.o. female.    Chief Complaint: Other (loss of smell )    Patient is a very pleasant 65 year old female here to see me today for the first time in consultation at the request of Dr. Mcintyre for evaluation of loss of sense of smell.  She reports having decreased sense of smell over the past 2 years.  She says she can smell strong chemicals when using cleaning products but otherwise can't smell anything.  She denies nasal obstruction or congestion.  She denies nasal drainage or postnasal drip.  She denies nasal trauma.  She does not smoke.  She has Flonase but has not tried it consistently.  She has occasional sneezing.  She has seen ENT in the past for this and says nothing was found.  She has not had any brain imaging.  She denies previous allergy testing or sinus surgery.  She does not have recurrent sinusitis.  She has occasional right epistaxis (not recently).  She has noticed that her sense of taste is now different and she really only tastes salt or sugar.  She has had recent COVID testing which was negative.  Denies headaches or visual changes.    Review of Systems   Constitutional: Negative for activity change, appetite change, fever and unexpected weight change.   HENT: Positive for nosebleeds (right), sneezing (sometimes) and tinnitus (occasional AU). Negative for nasal congestion, ear discharge, ear pain, hearing loss, postnasal drip, rhinorrhea, sinus pressure/congestion and sore throat.         Loss of smell   Eyes: Negative for discharge.   Respiratory: Negative for cough and shortness of breath.    Cardiovascular: Negative for chest pain.   Gastrointestinal: Negative for diarrhea, nausea and vomiting.   Musculoskeletal: Positive for arthralgias.   Allergic/Immunologic: Positive for environmental allergies. Negative for food allergies.   Neurological: Negative for dizziness, light-headedness and headaches.   Hematological: Negative for adenopathy.    Psychiatric/Behavioral: Negative for confusion.         Objective:      Physical Exam  Vitals signs reviewed.   Constitutional:       General: She is not in acute distress.     Appearance: She is well-developed.   HENT:      Head: Normocephalic and atraumatic.      Right Ear: Tympanic membrane, ear canal and external ear normal. No middle ear effusion. Tympanic membrane is not erythematous.      Left Ear: Tympanic membrane, ear canal and external ear normal.  No middle ear effusion. Tympanic membrane is not erythematous.      Nose: Septal deviation (right) present. No nasal deformity, mucosal edema, congestion or rhinorrhea.      Right Nostril: No epistaxis.      Left Nostril: No epistaxis.      Right Turbinates: Not enlarged.      Left Turbinates: Not enlarged (able to see head of left middle turbinate).      Right Sinus: No maxillary sinus tenderness or frontal sinus tenderness.      Left Sinus: No maxillary sinus tenderness or frontal sinus tenderness.      Mouth/Throat:      Mouth: Mucous membranes are moist. Mucous membranes are not pale and not dry.      Dentition: Normal dentition.      Pharynx: Uvula midline. No oropharyngeal exudate or posterior oropharyngeal erythema.   Eyes:      General: Lids are normal. No scleral icterus.     Extraocular Movements:      Right eye: Normal extraocular motion and no nystagmus.      Left eye: Normal extraocular motion and no nystagmus.      Conjunctiva/sclera: Conjunctivae normal.      Right eye: Right conjunctiva is not injected. No chemosis.     Left eye: Left conjunctiva is not injected. No chemosis.     Pupils: Pupils are equal, round, and reactive to light.   Neck:      Thyroid: No thyroid mass or thyromegaly.      Trachea: Trachea and phonation normal. No tracheal tenderness or tracheal deviation.   Pulmonary:      Effort: Pulmonary effort is normal. No respiratory distress.      Breath sounds: No stridor.   Abdominal:      General: There is no distension.    Lymphadenopathy:      Head:      Right side of head: No submental, submandibular, preauricular or posterior auricular adenopathy.      Left side of head: No submental, submandibular, preauricular or posterior auricular adenopathy.      Cervical: No cervical adenopathy.   Skin:     General: Skin is warm and dry.      Findings: No erythema or rash.   Neurological:      Mental Status: She is alert and oriented to person, place, and time.      Cranial Nerves: No cranial nerve deficit.   Psychiatric:         Behavior: Behavior normal. Behavior is cooperative.         Assessment:       1. Anosmia    2. Nasal septal deviation        Plan:         Discussed with patient that there can be several different causes for loss of smell including allergies, infection, obstruction, trauma and some neurodegenerative diseases.  Her intranasal exam today with nasal speculum is essentially normal other than a septal deviation.  I would recommend consistent daily use of Flonase x 4 weeks for any allergic component.   The patient was given a prescription for a steroid nasal spray, and we discussed in detail the proper mechanism of use directing the spray away from the nasal septum.  Discussed that she should then RTC with Dr. Burris if no improvement in her sense of smell after 4 weeks and will plan to proceed with nasal endoscopy to evaluate for any obstruction.  She is aware that she'll need COVID testing 72 hours prior to that exam.  If endoscopic exam is negative, I would recommend MRI Brain to evaluate the anterior fossa for any masses which could be present on the olfactory nerve.    Thanks for the referral Dr. Mcintyre.  Report returned via Epic.

## 2020-08-20 DIAGNOSIS — Z01.818 PRE-PROCEDURAL EXAMINATION: Primary | ICD-10-CM

## 2020-09-04 ENCOUNTER — LAB VISIT (OUTPATIENT)
Dept: OTOLARYNGOLOGY | Facility: CLINIC | Age: 65
End: 2020-09-04
Payer: COMMERCIAL

## 2020-09-04 DIAGNOSIS — Z01.818 PRE-PROCEDURAL EXAMINATION: ICD-10-CM

## 2020-09-04 PROCEDURE — U0003 INFECTIOUS AGENT DETECTION BY NUCLEIC ACID (DNA OR RNA); SEVERE ACUTE RESPIRATORY SYNDROME CORONAVIRUS 2 (SARS-COV-2) (CORONAVIRUS DISEASE [COVID-19]), AMPLIFIED PROBE TECHNIQUE, MAKING USE OF HIGH THROUGHPUT TECHNOLOGIES AS DESCRIBED BY CMS-2020-01-R: HCPCS

## 2020-09-05 LAB — SARS-COV-2 RNA RESP QL NAA+PROBE: NOT DETECTED

## 2020-09-08 ENCOUNTER — LAB VISIT (OUTPATIENT)
Dept: LAB | Facility: HOSPITAL | Age: 65
End: 2020-09-08
Attending: ORTHOPAEDIC SURGERY
Payer: COMMERCIAL

## 2020-09-08 ENCOUNTER — OFFICE VISIT (OUTPATIENT)
Dept: OTOLARYNGOLOGY | Facility: CLINIC | Age: 65
End: 2020-09-08
Payer: COMMERCIAL

## 2020-09-08 VITALS
HEART RATE: 85 BPM | DIASTOLIC BLOOD PRESSURE: 87 MMHG | BODY MASS INDEX: 43.59 KG/M2 | TEMPERATURE: 98 F | WEIGHT: 270.06 LBS | SYSTOLIC BLOOD PRESSURE: 133 MMHG

## 2020-09-08 DIAGNOSIS — G52.9 CRANIAL NERVE DYSFUNCTION: Primary | ICD-10-CM

## 2020-09-08 DIAGNOSIS — R43.0 ANOSMIA: ICD-10-CM

## 2020-09-08 DIAGNOSIS — G52.9 CRANIAL NERVE DYSFUNCTION: ICD-10-CM

## 2020-09-08 PROCEDURE — 3075F SYST BP GE 130 - 139MM HG: CPT | Mod: CPTII,S$GLB,, | Performed by: ORTHOPAEDIC SURGERY

## 2020-09-08 PROCEDURE — 31231 PR NASAL ENDOSCOPY, DX: ICD-10-PCS | Mod: S$GLB,,, | Performed by: ORTHOPAEDIC SURGERY

## 2020-09-08 PROCEDURE — 99214 OFFICE O/P EST MOD 30 MIN: CPT | Mod: 25,S$GLB,, | Performed by: ORTHOPAEDIC SURGERY

## 2020-09-08 PROCEDURE — 3079F DIAST BP 80-89 MM HG: CPT | Mod: CPTII,S$GLB,, | Performed by: ORTHOPAEDIC SURGERY

## 2020-09-08 PROCEDURE — 99999 PR PBB SHADOW E&M-EST. PATIENT-LVL IV: ICD-10-PCS | Mod: PBBFAC,,, | Performed by: ORTHOPAEDIC SURGERY

## 2020-09-08 PROCEDURE — 1101F PR PT FALLS ASSESS DOC 0-1 FALLS W/OUT INJ PAST YR: ICD-10-PCS | Mod: CPTII,S$GLB,, | Performed by: ORTHOPAEDIC SURGERY

## 2020-09-08 PROCEDURE — 3008F BODY MASS INDEX DOCD: CPT | Mod: CPTII,S$GLB,, | Performed by: ORTHOPAEDIC SURGERY

## 2020-09-08 PROCEDURE — 3008F PR BODY MASS INDEX (BMI) DOCUMENTED: ICD-10-PCS | Mod: CPTII,S$GLB,, | Performed by: ORTHOPAEDIC SURGERY

## 2020-09-08 PROCEDURE — 36415 COLL VENOUS BLD VENIPUNCTURE: CPT

## 2020-09-08 PROCEDURE — 99999 PR PBB SHADOW E&M-EST. PATIENT-LVL IV: CPT | Mod: PBBFAC,,, | Performed by: ORTHOPAEDIC SURGERY

## 2020-09-08 PROCEDURE — 3075F PR MOST RECENT SYSTOLIC BLOOD PRESS GE 130-139MM HG: ICD-10-PCS | Mod: CPTII,S$GLB,, | Performed by: ORTHOPAEDIC SURGERY

## 2020-09-08 PROCEDURE — 3079F PR MOST RECENT DIASTOLIC BLOOD PRESSURE 80-89 MM HG: ICD-10-PCS | Mod: CPTII,S$GLB,, | Performed by: ORTHOPAEDIC SURGERY

## 2020-09-08 PROCEDURE — 82565 ASSAY OF CREATININE: CPT

## 2020-09-08 PROCEDURE — 1101F PT FALLS ASSESS-DOCD LE1/YR: CPT | Mod: CPTII,S$GLB,, | Performed by: ORTHOPAEDIC SURGERY

## 2020-09-08 PROCEDURE — 99214 PR OFFICE/OUTPT VISIT, EST, LEVL IV, 30-39 MIN: ICD-10-PCS | Mod: 25,S$GLB,, | Performed by: ORTHOPAEDIC SURGERY

## 2020-09-08 PROCEDURE — 31231 NASAL ENDOSCOPY DX: CPT | Mod: S$GLB,,, | Performed by: ORTHOPAEDIC SURGERY

## 2020-09-08 NOTE — PROGRESS NOTES
Subjective:      Patient ID: Concepcion Don is a 65 y.o. female.    Chief Complaint: Other (Scope exam)    Patient is a very pleasant 65 year old female here to see me today for evaluation of loss of sense of smell.  She reports having decreased sense of smell over the past 2 years.  She says she can smell strong chemicals when using cleaning products but otherwise can't smell anything.  She denies nasal obstruction or congestion.  She denies nasal drainage or postnasal drip.  She denies nasal trauma.  She does not smoke.  She has Flonase but has not tried it consistently.  She has occasional sneezing.  She has seen ENT in the past for this and says nothing was found.  She has not had any brain imaging.  She denies previous allergy testing or sinus surgery.  She does not have recurrent sinusitis.  She has occasional right epistaxis (not recently).  She has noticed that her sense of taste is now different and she really only tastes salt or sugar.  She has had recent COVID testing which was negative.  Denies headaches or visual changes.        Review of Systems   Constitutional: Negative for chills, fatigue, fever and unexpected weight change.   HENT: Negative for congestion, dental problem, ear discharge, ear pain, facial swelling, hearing loss, nosebleeds, postnasal drip, rhinorrhea, sinus pressure, sneezing, sore throat, tinnitus, trouble swallowing and voice change.    Eyes: Negative for redness, itching and visual disturbance.   Respiratory: Negative for cough, choking, shortness of breath and wheezing.    Cardiovascular: Negative for chest pain and palpitations.   Gastrointestinal: Negative for abdominal pain.        No reflux.   Musculoskeletal: Negative for gait problem.   Skin: Negative for rash.   Neurological: Negative for dizziness, light-headedness and headaches.       Objective:       Physical Exam  Constitutional:       General: She is not in acute distress.     Appearance: She is well-developed.   HENT:       Head: Normocephalic and atraumatic.      Right Ear: Tympanic membrane, ear canal and external ear normal.      Left Ear: Tympanic membrane, ear canal and external ear normal.      Nose: Nose normal. No nasal deformity, septal deviation, mucosal edema or rhinorrhea.      Right Sinus: No maxillary sinus tenderness or frontal sinus tenderness.      Left Sinus: No maxillary sinus tenderness or frontal sinus tenderness.      Mouth/Throat:      Mouth: Mucous membranes are not pale and not dry.      Dentition: No dental caries.      Pharynx: Uvula midline. No oropharyngeal exudate or posterior oropharyngeal erythema.   Eyes:      General: Lids are normal. No scleral icterus.     Extraocular Movements:      Right eye: Normal extraocular motion and no nystagmus.      Left eye: Normal extraocular motion and no nystagmus.      Conjunctiva/sclera: Conjunctivae normal.      Right eye: Right conjunctiva is not injected. No chemosis.     Left eye: Left conjunctiva is not injected. No chemosis.     Pupils: Pupils are equal, round, and reactive to light.   Neck:      Thyroid: No thyroid mass or thyromegaly.      Trachea: Trachea and phonation normal. No tracheal tenderness or tracheal deviation.   Pulmonary:      Effort: Pulmonary effort is normal. No respiratory distress.      Breath sounds: No stridor.   Abdominal:      General: There is no distension.   Lymphadenopathy:      Head:      Right side of head: No submental, submandibular, preauricular, posterior auricular or occipital adenopathy.      Left side of head: No submental, submandibular, preauricular, posterior auricular or occipital adenopathy.      Cervical: No cervical adenopathy.   Skin:     General: Skin is warm and dry.      Findings: No erythema or rash.   Neurological:      Mental Status: She is alert and oriented to person, place, and time.      Cranial Nerves: No cranial nerve deficit.   Psychiatric:         Behavior: Behavior normal.     Patient: Concepcion  Arian [id number], :1955  Procedure date:2020  Patient's medications, allergies, past medical, surgical, social and family histories were reviewed and updated as appropriate.  Chief Complaint:  Other (Scope exam)    HPI:  Concepcion is a 65 y.o. female with the history of present illness as discussed in the clinic note from today.  Anterior rhinoscopy was insufficient to explain symptoms and findings.     Procedure: Risks, benefits, and alternatives of the procedure were discussed with the patient, and the patient consented to the nasal endoscopy.  The nasal cavity was sprayed with a topical decongestant and anesthetic (if needed). The endoscope was passed into each nostril and each nasal cavity was visualized.  On each side the nasal cavity, sinuses (if open), turbinates, middle and superior meatus, sphenoethmoidal recess and septum were examined with the findings described below. At the end of the examination, the scope was removed. The patient tolerated the procedure well with no complications.       Endoscopic Sinonasal Exam Findings:  -     The right side has normal mucosa  -     The left side has normal mucosa  -     Nasal secretions: No discolored secretions noted bilaterally  -     Nasal septum: no significant deviation or perforation appreciated   -     Inferior turbinate: Normal mucosa without significant hypertrophy bilaterally  -     Middle turbinate: Normal mucosa without significant hypertrophy bilaterally  -     Other findings: No mass or obstructive lesion  Superior recess clear bilaterally    Assessment & Plan:  - see today's clinic note        Assessment:       1. Cranial nerve dysfunction    2. Anosmia        Plan:     Cranial nerve dysfunction  -     MRI Brain With Contrast; Future; Expected date: 2020  -     Creatinine, serum; Future; Expected date: 2020    Anosmia  -     MRI Brain With Contrast; Future; Expected date: 2020  -     Creatinine, serum; Future; Expected date:  09/08/2020    I discussed with the patient that anosmia is most often a post-viral sequelae.  We discussed in detail the relevant anatomy of the olfactory nerve, that is located superiorly in the nasal cavity.  Her endoscopic exam today was normal, and did not reveal any intranasal mass or abnormality as the cause for anosmia.  I would recommend an MRI of the brain to examine for any intracranial causes of anosmia, including close evaluation of the area of the olfactory nerve.  If that is normal, then I would recommend observation, and use of a daily multivitamin as well as saline nasal spray.  Olfactory dysfunction most often resolves, though it may persist for several months.  On average, 80 percent of patients report subjective recovery after one year.

## 2020-09-09 LAB
CREAT SERPL-MCNC: 0.8 MG/DL (ref 0.5–1.4)
EST. GFR  (AFRICAN AMERICAN): >60 ML/MIN/1.73 M^2
EST. GFR  (NON AFRICAN AMERICAN): >60 ML/MIN/1.73 M^2

## 2020-09-11 ENCOUNTER — HOSPITAL ENCOUNTER (OUTPATIENT)
Dept: RADIOLOGY | Facility: HOSPITAL | Age: 65
Discharge: HOME | End: 2020-09-11
Attending: ORTHOPAEDIC SURGERY
Payer: COMMERCIAL

## 2020-09-11 DIAGNOSIS — R43.0 ANOSMIA: ICD-10-CM

## 2020-09-11 DIAGNOSIS — G52.9 CRANIAL NERVE DYSFUNCTION: ICD-10-CM

## 2020-09-11 PROCEDURE — 70553 MRI BRAIN W WO CONTRAST: ICD-10-PCS | Mod: 26,,, | Performed by: RADIOLOGY

## 2020-09-11 PROCEDURE — 25500020 PHARM REV CODE 255: Mod: PO | Performed by: ORTHOPAEDIC SURGERY

## 2020-09-11 PROCEDURE — 70553 MRI BRAIN STEM W/O & W/DYE: CPT | Mod: TC,PO

## 2020-09-11 PROCEDURE — 70553 MRI BRAIN STEM W/O & W/DYE: CPT | Mod: 26,,, | Performed by: RADIOLOGY

## 2020-09-11 PROCEDURE — A9585 GADOBUTROL INJECTION: HCPCS | Mod: PO | Performed by: ORTHOPAEDIC SURGERY

## 2020-09-11 RX ORDER — GADOBUTROL 604.72 MG/ML
10 INJECTION INTRAVENOUS
Status: COMPLETED | OUTPATIENT
Start: 2020-09-11 | End: 2020-09-11

## 2020-09-11 RX ADMIN — GADOBUTROL 10 ML: 604.72 INJECTION INTRAVENOUS at 09:09

## 2020-10-02 ENCOUNTER — OFFICE VISIT (OUTPATIENT)
Dept: INTERNAL MEDICINE | Facility: CLINIC | Age: 65
End: 2020-10-02
Payer: COMMERCIAL

## 2020-10-02 ENCOUNTER — LAB VISIT (OUTPATIENT)
Dept: LAB | Facility: HOSPITAL | Age: 65
End: 2020-10-02
Attending: FAMILY MEDICINE
Payer: COMMERCIAL

## 2020-10-02 VITALS
HEART RATE: 76 BPM | WEIGHT: 273.56 LBS | HEIGHT: 66 IN | DIASTOLIC BLOOD PRESSURE: 82 MMHG | OXYGEN SATURATION: 99 % | SYSTOLIC BLOOD PRESSURE: 122 MMHG | BODY MASS INDEX: 43.96 KG/M2

## 2020-10-02 DIAGNOSIS — E78.49 OTHER HYPERLIPIDEMIA: ICD-10-CM

## 2020-10-02 DIAGNOSIS — M17.12 OSTEOARTHRITIS OF LEFT KNEE, UNSPECIFIED OSTEOARTHRITIS TYPE: ICD-10-CM

## 2020-10-02 DIAGNOSIS — Z23 ENCOUNTER FOR ADMINISTRATION OF VACCINE: ICD-10-CM

## 2020-10-02 DIAGNOSIS — E07.9 THYROID DISEASE: ICD-10-CM

## 2020-10-02 DIAGNOSIS — I10 ESSENTIAL HYPERTENSION: Primary | ICD-10-CM

## 2020-10-02 DIAGNOSIS — I10 ESSENTIAL HYPERTENSION: ICD-10-CM

## 2020-10-02 LAB
ALBUMIN SERPL BCP-MCNC: 3.5 G/DL (ref 3.5–5.2)
ALP SERPL-CCNC: 129 U/L (ref 55–135)
ALT SERPL W/O P-5'-P-CCNC: 25 U/L (ref 10–44)
ANION GAP SERPL CALC-SCNC: 8 MMOL/L (ref 8–16)
AST SERPL-CCNC: 25 U/L (ref 10–40)
BASOPHILS # BLD AUTO: 0.01 K/UL (ref 0–0.2)
BASOPHILS NFR BLD: 0.1 % (ref 0–1.9)
BILIRUB SERPL-MCNC: 0.5 MG/DL (ref 0.1–1)
BUN SERPL-MCNC: 14 MG/DL (ref 8–23)
CALCIUM SERPL-MCNC: 9.5 MG/DL (ref 8.7–10.5)
CHLORIDE SERPL-SCNC: 104 MMOL/L (ref 95–110)
CO2 SERPL-SCNC: 28 MMOL/L (ref 23–29)
CREAT SERPL-MCNC: 0.8 MG/DL (ref 0.5–1.4)
DIFFERENTIAL METHOD: ABNORMAL
EOSINOPHIL # BLD AUTO: 0.2 K/UL (ref 0–0.5)
EOSINOPHIL NFR BLD: 1.7 % (ref 0–8)
ERYTHROCYTE [DISTWIDTH] IN BLOOD BY AUTOMATED COUNT: 14.2 % (ref 11.5–14.5)
EST. GFR  (AFRICAN AMERICAN): >60 ML/MIN/1.73 M^2
EST. GFR  (NON AFRICAN AMERICAN): >60 ML/MIN/1.73 M^2
GLUCOSE SERPL-MCNC: 111 MG/DL (ref 70–110)
HCT VFR BLD AUTO: 38.3 % (ref 37–48.5)
HGB BLD-MCNC: 12 G/DL (ref 12–16)
IMM GRANULOCYTES # BLD AUTO: 0.04 K/UL (ref 0–0.04)
IMM GRANULOCYTES NFR BLD AUTO: 0.5 % (ref 0–0.5)
LYMPHOCYTES # BLD AUTO: 3.1 K/UL (ref 1–4.8)
LYMPHOCYTES NFR BLD: 35.3 % (ref 18–48)
MCH RBC QN AUTO: 28.5 PG (ref 27–31)
MCHC RBC AUTO-ENTMCNC: 31.3 G/DL (ref 32–36)
MCV RBC AUTO: 91 FL (ref 82–98)
MONOCYTES # BLD AUTO: 0.8 K/UL (ref 0.3–1)
MONOCYTES NFR BLD: 9.2 % (ref 4–15)
NEUTROPHILS # BLD AUTO: 4.7 K/UL (ref 1.8–7.7)
NEUTROPHILS NFR BLD: 53.2 % (ref 38–73)
NRBC BLD-RTO: 0 /100 WBC
PLATELET # BLD AUTO: 212 K/UL (ref 150–350)
PMV BLD AUTO: 10.3 FL (ref 9.2–12.9)
POTASSIUM SERPL-SCNC: 4.4 MMOL/L (ref 3.5–5.1)
PROT SERPL-MCNC: 8.6 G/DL (ref 6–8.4)
RBC # BLD AUTO: 4.21 M/UL (ref 4–5.4)
SODIUM SERPL-SCNC: 140 MMOL/L (ref 136–145)
TSH SERPL DL<=0.005 MIU/L-ACNC: 1.42 UIU/ML (ref 0.4–4)
WBC # BLD AUTO: 8.87 K/UL (ref 3.9–12.7)

## 2020-10-02 PROCEDURE — 83036 HEMOGLOBIN GLYCOSYLATED A1C: CPT

## 2020-10-02 PROCEDURE — 90694 FLU VACCINE - QUADRIVALENT - ADJUVANTED: ICD-10-PCS | Mod: S$GLB,,, | Performed by: FAMILY MEDICINE

## 2020-10-02 PROCEDURE — 80053 COMPREHEN METABOLIC PANEL: CPT | Mod: PO

## 2020-10-02 PROCEDURE — 85025 COMPLETE CBC W/AUTO DIFF WBC: CPT | Mod: PO

## 2020-10-02 PROCEDURE — 3079F DIAST BP 80-89 MM HG: CPT | Mod: CPTII,S$GLB,, | Performed by: FAMILY MEDICINE

## 2020-10-02 PROCEDURE — 90472 PNEUMOCOCCAL CONJUGATE VACCINE 13-VALENT LESS THAN 5YO & GREATER THAN: ICD-10-PCS | Mod: S$GLB,,, | Performed by: FAMILY MEDICINE

## 2020-10-02 PROCEDURE — 3074F SYST BP LT 130 MM HG: CPT | Mod: CPTII,S$GLB,, | Performed by: FAMILY MEDICINE

## 2020-10-02 PROCEDURE — 99999 PR PBB SHADOW E&M-EST. PATIENT-LVL IV: CPT | Mod: PBBFAC,,, | Performed by: FAMILY MEDICINE

## 2020-10-02 PROCEDURE — 90471 IMMUNIZATION ADMIN: CPT | Mod: S$GLB,,, | Performed by: FAMILY MEDICINE

## 2020-10-02 PROCEDURE — 90670 PCV13 VACCINE IM: CPT | Mod: S$GLB,,, | Performed by: FAMILY MEDICINE

## 2020-10-02 PROCEDURE — 99999 PR PBB SHADOW E&M-EST. PATIENT-LVL IV: ICD-10-PCS | Mod: PBBFAC,,, | Performed by: FAMILY MEDICINE

## 2020-10-02 PROCEDURE — 99214 OFFICE O/P EST MOD 30 MIN: CPT | Mod: 25,S$GLB,, | Performed by: FAMILY MEDICINE

## 2020-10-02 PROCEDURE — 90694 VACC AIIV4 NO PRSRV 0.5ML IM: CPT | Mod: S$GLB,,, | Performed by: FAMILY MEDICINE

## 2020-10-02 PROCEDURE — 90670 PNEUMOCOCCAL CONJUGATE VACCINE 13-VALENT LESS THAN 5YO & GREATER THAN: ICD-10-PCS | Mod: S$GLB,,, | Performed by: FAMILY MEDICINE

## 2020-10-02 PROCEDURE — 3079F PR MOST RECENT DIASTOLIC BLOOD PRESSURE 80-89 MM HG: ICD-10-PCS | Mod: CPTII,S$GLB,, | Performed by: FAMILY MEDICINE

## 2020-10-02 PROCEDURE — 1101F PR PT FALLS ASSESS DOC 0-1 FALLS W/OUT INJ PAST YR: ICD-10-PCS | Mod: CPTII,S$GLB,, | Performed by: FAMILY MEDICINE

## 2020-10-02 PROCEDURE — 36415 COLL VENOUS BLD VENIPUNCTURE: CPT | Mod: PO

## 2020-10-02 PROCEDURE — 80061 LIPID PANEL: CPT

## 2020-10-02 PROCEDURE — 84443 ASSAY THYROID STIM HORMONE: CPT | Mod: PO

## 2020-10-02 PROCEDURE — 99214 PR OFFICE/OUTPT VISIT, EST, LEVL IV, 30-39 MIN: ICD-10-PCS | Mod: 25,S$GLB,, | Performed by: FAMILY MEDICINE

## 2020-10-02 PROCEDURE — 90472 IMMUNIZATION ADMIN EACH ADD: CPT | Mod: S$GLB,,, | Performed by: FAMILY MEDICINE

## 2020-10-02 PROCEDURE — 3074F PR MOST RECENT SYSTOLIC BLOOD PRESSURE < 130 MM HG: ICD-10-PCS | Mod: CPTII,S$GLB,, | Performed by: FAMILY MEDICINE

## 2020-10-02 PROCEDURE — 90471 FLU VACCINE - QUADRIVALENT - ADJUVANTED: ICD-10-PCS | Mod: S$GLB,,, | Performed by: FAMILY MEDICINE

## 2020-10-02 PROCEDURE — 1101F PT FALLS ASSESS-DOCD LE1/YR: CPT | Mod: CPTII,S$GLB,, | Performed by: FAMILY MEDICINE

## 2020-10-02 PROCEDURE — 3008F BODY MASS INDEX DOCD: CPT | Mod: CPTII,S$GLB,, | Performed by: FAMILY MEDICINE

## 2020-10-02 PROCEDURE — 3008F PR BODY MASS INDEX (BMI) DOCUMENTED: ICD-10-PCS | Mod: CPTII,S$GLB,, | Performed by: FAMILY MEDICINE

## 2020-10-02 NOTE — PROGRESS NOTES
Patient ID: Concepcion Don is a 65 y.o. female.    Chief Complaint: General check up and FLU & pneumonia vacc    HPI Patient is 65-year-old female who presents for annual exam and vaccinations.  Recently evaluated by ENT for anosmia. MRI brain with no acute abnormality.  Decreased sense of taste and smell for 2 years.  Can taste salt and sugar only.     Also requesting DMV mobility impairment tag as she continues to have intermittent left knee pain secondary to osteoarthritis worsened when walking longer distances.  Also continues to have intermittent left achilles tendon pain. Has seen podiatrist. Tylenol provides relief.    No acute complaints today.    Family History   Problem Relation Age of Onset    Kidney disease Mother     Hypertension Mother     Diabetes Mother     Hypertension Father     Stroke Father     Lupus Sister     Hypertension Maternal Grandmother     Hypertension Maternal Grandfather     Hypertension Paternal Grandmother     Hypertension Paternal Grandfather        Current Outpatient Medications:     acetaminophen (TYLENOL) 500 MG tablet, Take 1 tablet (500 mg total) by mouth every 6 (six) hours as needed for Pain., Disp: , Rfl: 0    aspirin (ECOTRIN) 81 MG EC tablet, Take 81 mg by mouth once daily., Disp: , Rfl:     atorvastatin (LIPITOR) 20 MG tablet, Take 1 tablet (20 mg total) by mouth once daily., Disp: 90 tablet, Rfl: 1    benazepril-hydrochlorthiazide (LOTENSIN HCT) 20-12.5 mg per tablet, Take 1 tablet by mouth once daily., Disp: 90 tablet, Rfl: 4    cetirizine (ZYRTEC) 10 MG tablet, Take 1 tablet (10 mg total) by mouth every evening., Disp: 90 tablet, Rfl: 1    fluticasone propionate (FLONASE) 50 mcg/actuation nasal spray, 1 spray (50 mcg total) by Each Nostril route once daily., Disp: 16 g, Rfl: 5    levothyroxine (SYNTHROID) 25 MCG tablet, Take 1 tablet (25 mcg total) by mouth once daily., Disp: 90 tablet, Rfl: 0    multivitamin capsule, Take 1 capsule by mouth once  "daily., Disp: , Rfl:     mupirocin (BACTROBAN) 2 % ointment, Apply topically 3 (three) times daily., Disp: 30 g, Rfl: 0    psyllium seed, with dextrose, (FIBER ORAL), Take by mouth once daily., Disp: , Rfl:     Review of Systems   Constitutional: Negative for activity change and unexpected weight change.   HENT: Negative for hearing loss, rhinorrhea and trouble swallowing.    Eyes: Negative for discharge and visual disturbance.   Respiratory: Negative for chest tightness and wheezing.    Cardiovascular: Negative for chest pain and palpitations.   Gastrointestinal: Negative for blood in stool, constipation, diarrhea and vomiting.   Endocrine: Negative for polydipsia and polyuria.   Genitourinary: Negative for difficulty urinating, dysuria, hematuria and menstrual problem.   Musculoskeletal: Positive for arthralgias and neck pain. Negative for joint swelling.   Neurological: Negative for weakness and headaches.   Psychiatric/Behavioral: Negative for confusion and dysphoric mood.       Objective:   /82 (BP Location: Left arm, Patient Position: Sitting, BP Method: Thigh Cuff (Manual))   Pulse 76   Ht 5' 6" (1.676 m)   Wt 124.1 kg (273 lb 9.5 oz)   SpO2 99%   BMI 44.16 kg/m²      Physical Exam  Constitutional:       General: She is not in acute distress.     Appearance: She is well-developed. She is obese. She is not ill-appearing.   HENT:      Head: Normocephalic and atraumatic.   Eyes:      Conjunctiva/sclera: Conjunctivae normal.   Neck:      Musculoskeletal: Normal range of motion.   Cardiovascular:      Rate and Rhythm: Normal rate and regular rhythm.      Heart sounds: Normal heart sounds.   Pulmonary:      Effort: Pulmonary effort is normal.      Breath sounds: Normal breath sounds.   Abdominal:      Palpations: Abdomen is soft.   Musculoskeletal: Normal range of motion.         General: Tenderness (b/l left knee, anterior medial and lateral ttp) present. No swelling or deformity.   Skin:     General: " Skin is warm.   Neurological:      Mental Status: She is alert and oriented to person, place, and time.   Psychiatric:         Behavior: Behavior normal.         Assessment & Plan     Problem List Items Addressed This Visit        Cardiac/Vascular    Hypertension - Primary    Current Assessment & Plan     -controlled.  Continue benazepril-hydrochlorothiazide         Relevant Orders    CBC auto differential (Completed)    Comprehensive metabolic panel (Completed)    Hemoglobin A1C    Other hyperlipidemia    Current Assessment & Plan     -continue Lipitor.         Relevant Orders    Lipid Panel       Endocrine    Thyroid disease    Current Assessment & Plan     -continue Synthroid.         Relevant Orders    TSH       Orthopedic    Osteoarthritis of left knee    Current Assessment & Plan     -Tylenol provides some relief.  Pain aggravated by walking longer distances.  Can consider physical therapy           Other Visit Diagnoses     Encounter for administration of vaccine        Relevant Orders    Pneumococcal Conjugate Vaccine (13 Valent) (IM) (Completed)    Zoster Recombinant Vaccine        Influenza, pneumonia and shingles vaccinations administered today.  Mobility impairment temporary tag form completed    Follow up in about 6 months (around 4/2/2021) for Hypertension follow-up.      Disclaimer:  This note may have been prepared using voice recognition software, without extensive proofreading. As such, there could be errors within the text such as sound alike errors.

## 2020-10-02 NOTE — ASSESSMENT & PLAN NOTE
-Tylenol provides some relief.  Pain aggravated by walking longer distances.  Can consider physical therapy

## 2020-10-03 LAB
CHOLEST SERPL-MCNC: 129 MG/DL (ref 120–199)
CHOLEST/HDLC SERPL: 2.7 {RATIO} (ref 2–5)
ESTIMATED AVG GLUCOSE: 108 MG/DL (ref 68–131)
HBA1C MFR BLD HPLC: 5.4 % (ref 4–5.6)
HDLC SERPL-MCNC: 47 MG/DL (ref 40–75)
HDLC SERPL: 36.4 % (ref 20–50)
LDLC SERPL CALC-MCNC: 71 MG/DL (ref 63–159)
NONHDLC SERPL-MCNC: 82 MG/DL
TRIGL SERPL-MCNC: 55 MG/DL (ref 30–150)

## 2020-10-05 NOTE — PROGRESS NOTES
Please notify Ms. Don that labs have been reviewed.  Her hemoglobin A1c, thyroid lab and lipid panel are all within normal limits.  A few other labs are out of range but no concerning findings that require further workup

## 2020-10-12 ENCOUNTER — TELEPHONE (OUTPATIENT)
Dept: INTERNAL MEDICINE | Facility: CLINIC | Age: 65
End: 2020-10-12

## 2020-10-12 ENCOUNTER — CLINICAL SUPPORT (OUTPATIENT)
Dept: INTERNAL MEDICINE | Facility: CLINIC | Age: 65
End: 2020-10-12
Payer: COMMERCIAL

## 2020-10-12 PROCEDURE — 99999 PR PBB SHADOW E&M-EST. PATIENT-LVL II: ICD-10-PCS | Mod: PBBFAC,,,

## 2020-10-12 PROCEDURE — 90471 IMMUNIZATION ADMIN: CPT | Mod: S$GLB,,, | Performed by: NURSE PRACTITIONER

## 2020-10-12 PROCEDURE — 90750 ZOSTER RECOMBINANT VACCINE: ICD-10-PCS | Mod: S$GLB,,, | Performed by: NURSE PRACTITIONER

## 2020-10-12 PROCEDURE — 90750 HZV VACC RECOMBINANT IM: CPT | Mod: S$GLB,,, | Performed by: NURSE PRACTITIONER

## 2020-10-12 PROCEDURE — 99999 PR PBB SHADOW E&M-EST. PATIENT-LVL II: CPT | Mod: PBBFAC,,,

## 2020-10-12 PROCEDURE — 90471 ZOSTER RECOMBINANT VACCINE: ICD-10-PCS | Mod: S$GLB,,, | Performed by: NURSE PRACTITIONER

## 2020-10-20 ENCOUNTER — PATIENT MESSAGE (OUTPATIENT)
Dept: OTOLARYNGOLOGY | Facility: CLINIC | Age: 65
End: 2020-10-20

## 2020-10-27 ENCOUNTER — PATIENT MESSAGE (OUTPATIENT)
Dept: INTERNAL MEDICINE | Facility: CLINIC | Age: 65
End: 2020-10-27

## 2020-10-27 DIAGNOSIS — E07.9 THYROID DISEASE: ICD-10-CM

## 2020-10-27 RX ORDER — LEVOTHYROXINE SODIUM 25 UG/1
25 TABLET ORAL DAILY
Qty: 90 TABLET | Refills: 0 | Status: SHIPPED | OUTPATIENT
Start: 2020-10-27 | End: 2021-02-09 | Stop reason: SDUPTHER

## 2021-01-20 ENCOUNTER — PATIENT OUTREACH (OUTPATIENT)
Dept: ADMINISTRATIVE | Facility: HOSPITAL | Age: 66
End: 2021-01-20

## 2021-01-20 DIAGNOSIS — Z12.31 OTHER SCREENING MAMMOGRAM: ICD-10-CM

## 2021-01-26 DIAGNOSIS — E07.9 THYROID DISEASE: ICD-10-CM

## 2021-01-26 DIAGNOSIS — E78.49 OTHER HYPERLIPIDEMIA: ICD-10-CM

## 2021-01-26 RX ORDER — ATORVASTATIN CALCIUM 20 MG/1
TABLET, FILM COATED ORAL
Qty: 90 TABLET | Refills: 0 | OUTPATIENT
Start: 2021-01-26

## 2021-01-26 RX ORDER — LEVOTHYROXINE SODIUM 25 UG/1
TABLET ORAL
Qty: 90 TABLET | Refills: 0 | OUTPATIENT
Start: 2021-01-26

## 2021-02-09 ENCOUNTER — OFFICE VISIT (OUTPATIENT)
Dept: INTERNAL MEDICINE | Facility: CLINIC | Age: 66
End: 2021-02-09
Payer: COMMERCIAL

## 2021-02-09 VITALS
HEIGHT: 66 IN | SYSTOLIC BLOOD PRESSURE: 120 MMHG | WEIGHT: 261.25 LBS | TEMPERATURE: 98 F | HEART RATE: 64 BPM | DIASTOLIC BLOOD PRESSURE: 70 MMHG | BODY MASS INDEX: 41.99 KG/M2

## 2021-02-09 DIAGNOSIS — Z28.9 DELAYED IMMUNIZATIONS: ICD-10-CM

## 2021-02-09 DIAGNOSIS — E78.49 OTHER HYPERLIPIDEMIA: ICD-10-CM

## 2021-02-09 DIAGNOSIS — M76.60 ACHILLES TENDINITIS, UNSPECIFIED LATERALITY: ICD-10-CM

## 2021-02-09 DIAGNOSIS — I10 ESSENTIAL HYPERTENSION: Primary | ICD-10-CM

## 2021-02-09 DIAGNOSIS — Z78.0 POSTMENOPAUSAL: ICD-10-CM

## 2021-02-09 DIAGNOSIS — E07.9 THYROID DISEASE: ICD-10-CM

## 2021-02-09 PROCEDURE — 90750 ZOSTER RECOMBINANT VACCINE: ICD-10-PCS | Mod: S$GLB,,, | Performed by: FAMILY MEDICINE

## 2021-02-09 PROCEDURE — 1126F PR PAIN SEVERITY QUANTIFIED, NO PAIN PRESENT: ICD-10-PCS | Mod: S$GLB,,, | Performed by: FAMILY MEDICINE

## 2021-02-09 PROCEDURE — 1101F PR PT FALLS ASSESS DOC 0-1 FALLS W/OUT INJ PAST YR: ICD-10-PCS | Mod: CPTII,S$GLB,, | Performed by: FAMILY MEDICINE

## 2021-02-09 PROCEDURE — 3288F PR FALLS RISK ASSESSMENT DOCUMENTED: ICD-10-PCS | Mod: CPTII,S$GLB,, | Performed by: FAMILY MEDICINE

## 2021-02-09 PROCEDURE — 90750 HZV VACC RECOMBINANT IM: CPT | Mod: S$GLB,,, | Performed by: FAMILY MEDICINE

## 2021-02-09 PROCEDURE — 3074F PR MOST RECENT SYSTOLIC BLOOD PRESSURE < 130 MM HG: ICD-10-PCS | Mod: CPTII,S$GLB,, | Performed by: FAMILY MEDICINE

## 2021-02-09 PROCEDURE — 90471 ZOSTER RECOMBINANT VACCINE: ICD-10-PCS | Mod: S$GLB,,, | Performed by: FAMILY MEDICINE

## 2021-02-09 PROCEDURE — 99214 PR OFFICE/OUTPT VISIT, EST, LEVL IV, 30-39 MIN: ICD-10-PCS | Mod: 25,S$GLB,, | Performed by: FAMILY MEDICINE

## 2021-02-09 PROCEDURE — 99999 PR PBB SHADOW E&M-EST. PATIENT-LVL V: CPT | Mod: PBBFAC,,, | Performed by: FAMILY MEDICINE

## 2021-02-09 PROCEDURE — 3008F PR BODY MASS INDEX (BMI) DOCUMENTED: ICD-10-PCS | Mod: CPTII,S$GLB,, | Performed by: FAMILY MEDICINE

## 2021-02-09 PROCEDURE — 1126F AMNT PAIN NOTED NONE PRSNT: CPT | Mod: S$GLB,,, | Performed by: FAMILY MEDICINE

## 2021-02-09 PROCEDURE — 90471 IMMUNIZATION ADMIN: CPT | Mod: S$GLB,,, | Performed by: FAMILY MEDICINE

## 2021-02-09 PROCEDURE — 99214 OFFICE O/P EST MOD 30 MIN: CPT | Mod: 25,S$GLB,, | Performed by: FAMILY MEDICINE

## 2021-02-09 PROCEDURE — 3288F FALL RISK ASSESSMENT DOCD: CPT | Mod: CPTII,S$GLB,, | Performed by: FAMILY MEDICINE

## 2021-02-09 PROCEDURE — 99999 PR PBB SHADOW E&M-EST. PATIENT-LVL V: ICD-10-PCS | Mod: PBBFAC,,, | Performed by: FAMILY MEDICINE

## 2021-02-09 PROCEDURE — 3078F PR MOST RECENT DIASTOLIC BLOOD PRESSURE < 80 MM HG: ICD-10-PCS | Mod: CPTII,S$GLB,, | Performed by: FAMILY MEDICINE

## 2021-02-09 PROCEDURE — 3074F SYST BP LT 130 MM HG: CPT | Mod: CPTII,S$GLB,, | Performed by: FAMILY MEDICINE

## 2021-02-09 PROCEDURE — 3008F BODY MASS INDEX DOCD: CPT | Mod: CPTII,S$GLB,, | Performed by: FAMILY MEDICINE

## 2021-02-09 PROCEDURE — 3078F DIAST BP <80 MM HG: CPT | Mod: CPTII,S$GLB,, | Performed by: FAMILY MEDICINE

## 2021-02-09 PROCEDURE — 1101F PT FALLS ASSESS-DOCD LE1/YR: CPT | Mod: CPTII,S$GLB,, | Performed by: FAMILY MEDICINE

## 2021-02-09 RX ORDER — BENAZEPRIL HYDROCHLORIDE AND HYDROCHLOROTHIAZIDE 20; 12.5 MG/1; MG/1
1 TABLET ORAL DAILY
Qty: 90 TABLET | Refills: 1 | Status: SHIPPED | OUTPATIENT
Start: 2021-02-09 | End: 2021-08-13 | Stop reason: SDUPTHER

## 2021-02-09 RX ORDER — LEVOTHYROXINE SODIUM 25 UG/1
25 TABLET ORAL DAILY
Qty: 90 TABLET | Refills: 1 | Status: SHIPPED | OUTPATIENT
Start: 2021-02-09 | End: 2021-08-13 | Stop reason: SDUPTHER

## 2021-02-09 RX ORDER — ATORVASTATIN CALCIUM 20 MG/1
20 TABLET, FILM COATED ORAL DAILY
Qty: 90 TABLET | Refills: 1 | Status: SHIPPED | OUTPATIENT
Start: 2021-02-09 | End: 2021-08-13 | Stop reason: SDUPTHER

## 2021-02-23 ENCOUNTER — CLINICAL SUPPORT (OUTPATIENT)
Dept: REHABILITATION | Facility: HOSPITAL | Age: 66
End: 2021-02-23
Attending: FAMILY MEDICINE
Payer: COMMERCIAL

## 2021-02-23 DIAGNOSIS — M76.62 ACHILLES TENDINITIS OF LEFT LOWER EXTREMITY: ICD-10-CM

## 2021-02-23 DIAGNOSIS — M25.572 CHRONIC PAIN OF LEFT ANKLE: ICD-10-CM

## 2021-02-23 DIAGNOSIS — G89.29 CHRONIC PAIN OF LEFT ANKLE: ICD-10-CM

## 2021-02-23 DIAGNOSIS — R26.9 ABNORMALITY OF GAIT AND MOBILITY: Primary | ICD-10-CM

## 2021-02-23 PROCEDURE — 97161 PT EVAL LOW COMPLEX 20 MIN: CPT | Performed by: PHYSICAL THERAPIST

## 2021-02-23 PROCEDURE — 97110 THERAPEUTIC EXERCISES: CPT | Performed by: PHYSICAL THERAPIST

## 2021-02-25 ENCOUNTER — PATIENT OUTREACH (OUTPATIENT)
Dept: ADMINISTRATIVE | Facility: HOSPITAL | Age: 66
End: 2021-02-25

## 2021-03-02 ENCOUNTER — CLINICAL SUPPORT (OUTPATIENT)
Dept: REHABILITATION | Facility: HOSPITAL | Age: 66
End: 2021-03-02
Payer: COMMERCIAL

## 2021-03-02 DIAGNOSIS — M76.62 ACHILLES TENDINITIS OF LEFT LOWER EXTREMITY: ICD-10-CM

## 2021-03-02 DIAGNOSIS — G89.29 CHRONIC PAIN OF LEFT ANKLE: ICD-10-CM

## 2021-03-02 DIAGNOSIS — M25.572 CHRONIC PAIN OF LEFT ANKLE: ICD-10-CM

## 2021-03-02 DIAGNOSIS — R26.9 ABNORMALITY OF GAIT AND MOBILITY: ICD-10-CM

## 2021-03-02 PROCEDURE — 97110 THERAPEUTIC EXERCISES: CPT | Mod: CQ

## 2021-03-05 ENCOUNTER — CLINICAL SUPPORT (OUTPATIENT)
Dept: REHABILITATION | Facility: HOSPITAL | Age: 66
End: 2021-03-05
Payer: COMMERCIAL

## 2021-03-05 DIAGNOSIS — M76.62 ACHILLES TENDINITIS OF LEFT LOWER EXTREMITY: ICD-10-CM

## 2021-03-05 DIAGNOSIS — M25.572 CHRONIC PAIN OF LEFT ANKLE: ICD-10-CM

## 2021-03-05 DIAGNOSIS — G89.29 CHRONIC PAIN OF LEFT ANKLE: ICD-10-CM

## 2021-03-05 DIAGNOSIS — R26.9 ABNORMALITY OF GAIT AND MOBILITY: ICD-10-CM

## 2021-03-05 PROCEDURE — 97110 THERAPEUTIC EXERCISES: CPT | Performed by: PHYSICAL THERAPIST

## 2021-03-05 PROCEDURE — 97140 MANUAL THERAPY 1/> REGIONS: CPT | Performed by: PHYSICAL THERAPIST

## 2021-03-08 ENCOUNTER — PATIENT OUTREACH (OUTPATIENT)
Dept: ADMINISTRATIVE | Facility: HOSPITAL | Age: 66
End: 2021-03-08

## 2021-03-12 ENCOUNTER — CLINICAL SUPPORT (OUTPATIENT)
Dept: REHABILITATION | Facility: HOSPITAL | Age: 66
End: 2021-03-12
Payer: COMMERCIAL

## 2021-03-12 DIAGNOSIS — R26.9 ABNORMALITY OF GAIT AND MOBILITY: ICD-10-CM

## 2021-03-12 DIAGNOSIS — G89.29 CHRONIC PAIN OF LEFT ANKLE: ICD-10-CM

## 2021-03-12 DIAGNOSIS — M25.572 CHRONIC PAIN OF LEFT ANKLE: ICD-10-CM

## 2021-03-12 DIAGNOSIS — M76.62 ACHILLES TENDINITIS OF LEFT LOWER EXTREMITY: ICD-10-CM

## 2021-03-12 PROCEDURE — 97140 MANUAL THERAPY 1/> REGIONS: CPT | Performed by: PHYSICAL THERAPIST

## 2021-03-12 PROCEDURE — 97110 THERAPEUTIC EXERCISES: CPT | Performed by: PHYSICAL THERAPIST

## 2021-03-15 ENCOUNTER — CLINICAL SUPPORT (OUTPATIENT)
Dept: REHABILITATION | Facility: HOSPITAL | Age: 66
End: 2021-03-15
Payer: COMMERCIAL

## 2021-03-15 DIAGNOSIS — G89.29 CHRONIC PAIN OF LEFT ANKLE: ICD-10-CM

## 2021-03-15 DIAGNOSIS — M76.62 ACHILLES TENDINITIS OF LEFT LOWER EXTREMITY: ICD-10-CM

## 2021-03-15 DIAGNOSIS — M25.572 CHRONIC PAIN OF LEFT ANKLE: ICD-10-CM

## 2021-03-15 DIAGNOSIS — R26.9 ABNORMALITY OF GAIT AND MOBILITY: ICD-10-CM

## 2021-03-15 PROCEDURE — 97140 MANUAL THERAPY 1/> REGIONS: CPT | Mod: CQ

## 2021-03-15 PROCEDURE — 97110 THERAPEUTIC EXERCISES: CPT | Mod: CQ

## 2021-03-19 ENCOUNTER — CLINICAL SUPPORT (OUTPATIENT)
Dept: REHABILITATION | Facility: HOSPITAL | Age: 66
End: 2021-03-19
Payer: COMMERCIAL

## 2021-03-19 DIAGNOSIS — M76.62 ACHILLES TENDINITIS OF LEFT LOWER EXTREMITY: ICD-10-CM

## 2021-03-19 DIAGNOSIS — R26.9 ABNORMALITY OF GAIT AND MOBILITY: ICD-10-CM

## 2021-03-19 DIAGNOSIS — G89.29 CHRONIC PAIN OF LEFT ANKLE: ICD-10-CM

## 2021-03-19 DIAGNOSIS — M25.572 CHRONIC PAIN OF LEFT ANKLE: ICD-10-CM

## 2021-03-19 PROCEDURE — 97140 MANUAL THERAPY 1/> REGIONS: CPT | Performed by: PHYSICAL THERAPIST

## 2021-03-19 PROCEDURE — 97110 THERAPEUTIC EXERCISES: CPT | Performed by: PHYSICAL THERAPIST

## 2021-03-26 ENCOUNTER — CLINICAL SUPPORT (OUTPATIENT)
Dept: REHABILITATION | Facility: HOSPITAL | Age: 66
End: 2021-03-26
Payer: COMMERCIAL

## 2021-03-26 DIAGNOSIS — G89.29 CHRONIC PAIN OF LEFT ANKLE: ICD-10-CM

## 2021-03-26 DIAGNOSIS — R26.9 ABNORMALITY OF GAIT AND MOBILITY: ICD-10-CM

## 2021-03-26 DIAGNOSIS — M25.572 CHRONIC PAIN OF LEFT ANKLE: ICD-10-CM

## 2021-03-26 DIAGNOSIS — M76.62 ACHILLES TENDINITIS OF LEFT LOWER EXTREMITY: ICD-10-CM

## 2021-03-26 PROCEDURE — 97110 THERAPEUTIC EXERCISES: CPT | Performed by: PHYSICAL THERAPIST

## 2021-03-26 PROCEDURE — 97140 MANUAL THERAPY 1/> REGIONS: CPT | Performed by: PHYSICAL THERAPIST

## 2021-05-17 PROBLEM — Z00.00 ROUTINE GENERAL MEDICAL EXAMINATION AT A HEALTH CARE FACILITY: Status: RESOLVED | Noted: 2017-11-29 | Resolved: 2021-05-17

## 2021-06-17 ENCOUNTER — PATIENT MESSAGE (OUTPATIENT)
Dept: INTERNAL MEDICINE | Facility: CLINIC | Age: 66
End: 2021-06-17

## 2021-06-30 ENCOUNTER — APPOINTMENT (OUTPATIENT)
Dept: RADIOLOGY | Facility: HOSPITAL | Age: 66
End: 2021-06-30
Attending: FAMILY MEDICINE
Payer: COMMERCIAL

## 2021-06-30 DIAGNOSIS — Z78.0 POSTMENOPAUSAL: ICD-10-CM

## 2021-06-30 PROCEDURE — 77080 DEXA BONE DENSITY SPINE HIP: ICD-10-PCS | Mod: 26,,, | Performed by: RADIOLOGY

## 2021-06-30 PROCEDURE — 77080 DXA BONE DENSITY AXIAL: CPT | Mod: 26,,, | Performed by: RADIOLOGY

## 2021-06-30 PROCEDURE — 77080 DXA BONE DENSITY AXIAL: CPT | Mod: TC

## 2021-08-13 ENCOUNTER — OFFICE VISIT (OUTPATIENT)
Dept: INTERNAL MEDICINE | Facility: CLINIC | Age: 66
End: 2021-08-13
Payer: COMMERCIAL

## 2021-08-13 VITALS — SYSTOLIC BLOOD PRESSURE: 120 MMHG | DIASTOLIC BLOOD PRESSURE: 78 MMHG

## 2021-08-13 DIAGNOSIS — I10 ESSENTIAL HYPERTENSION: ICD-10-CM

## 2021-08-13 DIAGNOSIS — E07.9 THYROID DISEASE: ICD-10-CM

## 2021-08-13 DIAGNOSIS — J06.9 VIRAL UPPER RESPIRATORY TRACT INFECTION: ICD-10-CM

## 2021-08-13 DIAGNOSIS — E78.49 OTHER HYPERLIPIDEMIA: ICD-10-CM

## 2021-08-13 DIAGNOSIS — Z78.0 POSTMENOPAUSAL: ICD-10-CM

## 2021-08-13 DIAGNOSIS — Z00.00 ROUTINE GENERAL MEDICAL EXAMINATION AT A HEALTH CARE FACILITY: Primary | ICD-10-CM

## 2021-08-13 PROCEDURE — 3078F PR MOST RECENT DIASTOLIC BLOOD PRESSURE < 80 MM HG: ICD-10-PCS | Mod: CPTII,,, | Performed by: FAMILY MEDICINE

## 2021-08-13 PROCEDURE — 1159F PR MEDICATION LIST DOCUMENTED IN MEDICAL RECORD: ICD-10-PCS | Mod: CPTII,,, | Performed by: FAMILY MEDICINE

## 2021-08-13 PROCEDURE — 3074F PR MOST RECENT SYSTOLIC BLOOD PRESSURE < 130 MM HG: ICD-10-PCS | Mod: CPTII,,, | Performed by: FAMILY MEDICINE

## 2021-08-13 PROCEDURE — 1159F MED LIST DOCD IN RCRD: CPT | Mod: CPTII,,, | Performed by: FAMILY MEDICINE

## 2021-08-13 PROCEDURE — 99397 PER PM REEVAL EST PAT 65+ YR: CPT | Mod: 95,,, | Performed by: FAMILY MEDICINE

## 2021-08-13 PROCEDURE — 99397 PR PREVENTIVE VISIT,EST,65 & OVER: ICD-10-PCS | Mod: 95,,, | Performed by: FAMILY MEDICINE

## 2021-08-13 PROCEDURE — 3078F DIAST BP <80 MM HG: CPT | Mod: CPTII,,, | Performed by: FAMILY MEDICINE

## 2021-08-13 PROCEDURE — 1160F RVW MEDS BY RX/DR IN RCRD: CPT | Mod: CPTII,,, | Performed by: FAMILY MEDICINE

## 2021-08-13 PROCEDURE — 3074F SYST BP LT 130 MM HG: CPT | Mod: CPTII,,, | Performed by: FAMILY MEDICINE

## 2021-08-13 PROCEDURE — 1160F PR REVIEW ALL MEDS BY PRESCRIBER/CLIN PHARMACIST DOCUMENTED: ICD-10-PCS | Mod: CPTII,,, | Performed by: FAMILY MEDICINE

## 2021-08-13 RX ORDER — BENAZEPRIL HYDROCHLORIDE AND HYDROCHLOROTHIAZIDE 20; 12.5 MG/1; MG/1
1 TABLET ORAL DAILY
Qty: 90 TABLET | Refills: 3 | Status: SHIPPED | OUTPATIENT
Start: 2021-08-13 | End: 2022-08-23 | Stop reason: SDUPTHER

## 2021-08-13 RX ORDER — CETIRIZINE HYDROCHLORIDE 10 MG/1
10 TABLET ORAL NIGHTLY
Qty: 90 TABLET | Refills: 3 | Status: SHIPPED | OUTPATIENT
Start: 2021-08-13 | End: 2022-08-23 | Stop reason: SDUPTHER

## 2021-08-13 RX ORDER — CALCIUM CARBONATE/VITAMIN D3 600 MG-20
1 TABLET,CHEWABLE ORAL ONCE
Qty: 360 TABLET | Refills: 1 | Status: SHIPPED | OUTPATIENT
Start: 2021-08-13 | End: 2021-08-13 | Stop reason: SDUPTHER

## 2021-08-13 RX ORDER — ASPIRIN 81 MG/1
81 TABLET ORAL DAILY
Qty: 360 TABLET | Refills: 1 | Status: SHIPPED | OUTPATIENT
Start: 2021-08-13 | End: 2022-08-23 | Stop reason: SDUPTHER

## 2021-08-13 RX ORDER — LEVOTHYROXINE SODIUM 25 UG/1
25 TABLET ORAL DAILY
Qty: 90 TABLET | Refills: 3 | Status: SHIPPED | OUTPATIENT
Start: 2021-08-13 | End: 2022-08-23 | Stop reason: SDUPTHER

## 2021-08-13 RX ORDER — CALCIUM CARBONATE/VITAMIN D3 600 MG-20
1 TABLET,CHEWABLE ORAL ONCE
Qty: 360 TABLET | Refills: 1 | Status: SHIPPED | OUTPATIENT
Start: 2021-08-13 | End: 2021-08-13

## 2021-08-13 RX ORDER — FLUTICASONE PROPIONATE 50 MCG
1 SPRAY, SUSPENSION (ML) NASAL DAILY
Qty: 16 G | Refills: 5 | Status: SHIPPED | OUTPATIENT
Start: 2021-08-13 | End: 2022-08-23 | Stop reason: SDUPTHER

## 2021-08-13 RX ORDER — ATORVASTATIN CALCIUM 20 MG/1
20 TABLET, FILM COATED ORAL DAILY
Qty: 90 TABLET | Refills: 1 | Status: SHIPPED | OUTPATIENT
Start: 2021-08-13 | End: 2022-08-23 | Stop reason: SDUPTHER

## 2021-08-16 ENCOUNTER — LAB VISIT (OUTPATIENT)
Dept: LAB | Facility: HOSPITAL | Age: 66
End: 2021-08-16
Attending: FAMILY MEDICINE
Payer: COMMERCIAL

## 2021-08-16 DIAGNOSIS — Z00.00 ROUTINE GENERAL MEDICAL EXAMINATION AT A HEALTH CARE FACILITY: ICD-10-CM

## 2021-08-16 DIAGNOSIS — E07.9 THYROID DISEASE: ICD-10-CM

## 2021-08-16 LAB
25(OH)D3+25(OH)D2 SERPL-MCNC: 47 NG/ML (ref 30–96)
ALBUMIN SERPL BCP-MCNC: 3.4 G/DL (ref 3.5–5.2)
ALP SERPL-CCNC: 120 U/L (ref 55–135)
ALT SERPL W/O P-5'-P-CCNC: 20 U/L (ref 10–44)
ANION GAP SERPL CALC-SCNC: 9 MMOL/L (ref 8–16)
AST SERPL-CCNC: 20 U/L (ref 10–40)
BASOPHILS # BLD AUTO: 0.04 K/UL (ref 0–0.2)
BASOPHILS NFR BLD: 0.5 % (ref 0–1.9)
BILIRUB SERPL-MCNC: 0.7 MG/DL (ref 0.1–1)
BUN SERPL-MCNC: 14 MG/DL (ref 8–23)
CALCIUM SERPL-MCNC: 9.5 MG/DL (ref 8.7–10.5)
CHLORIDE SERPL-SCNC: 104 MMOL/L (ref 95–110)
CHOLEST SERPL-MCNC: 132 MG/DL (ref 120–199)
CHOLEST/HDLC SERPL: 3.1 {RATIO} (ref 2–5)
CO2 SERPL-SCNC: 27 MMOL/L (ref 23–29)
CREAT SERPL-MCNC: 0.8 MG/DL (ref 0.5–1.4)
DIFFERENTIAL METHOD: ABNORMAL
EOSINOPHIL # BLD AUTO: 0.2 K/UL (ref 0–0.5)
EOSINOPHIL NFR BLD: 2.2 % (ref 0–8)
ERYTHROCYTE [DISTWIDTH] IN BLOOD BY AUTOMATED COUNT: 14.3 % (ref 11.5–14.5)
EST. GFR  (AFRICAN AMERICAN): >60 ML/MIN/1.73 M^2
EST. GFR  (NON AFRICAN AMERICAN): >60 ML/MIN/1.73 M^2
ESTIMATED AVG GLUCOSE: 103 MG/DL (ref 68–131)
FERRITIN SERPL-MCNC: 209 NG/ML (ref 20–300)
GLUCOSE SERPL-MCNC: 118 MG/DL (ref 70–110)
HBA1C MFR BLD: 5.2 % (ref 4–5.6)
HCT VFR BLD AUTO: 37.9 % (ref 37–48.5)
HDLC SERPL-MCNC: 42 MG/DL (ref 40–75)
HDLC SERPL: 31.8 % (ref 20–50)
HGB BLD-MCNC: 12 G/DL (ref 12–16)
IMM GRANULOCYTES # BLD AUTO: 0.04 K/UL (ref 0–0.04)
IMM GRANULOCYTES NFR BLD AUTO: 0.5 % (ref 0–0.5)
IRON SERPL-MCNC: 55 UG/DL (ref 30–160)
LDLC SERPL CALC-MCNC: 75.8 MG/DL (ref 63–159)
LYMPHOCYTES # BLD AUTO: 2.9 K/UL (ref 1–4.8)
LYMPHOCYTES NFR BLD: 34.2 % (ref 18–48)
MCH RBC QN AUTO: 28.4 PG (ref 27–31)
MCHC RBC AUTO-ENTMCNC: 31.7 G/DL (ref 32–36)
MCV RBC AUTO: 90 FL (ref 82–98)
MONOCYTES # BLD AUTO: 0.8 K/UL (ref 0.3–1)
MONOCYTES NFR BLD: 9.8 % (ref 4–15)
NEUTROPHILS # BLD AUTO: 4.5 K/UL (ref 1.8–7.7)
NEUTROPHILS NFR BLD: 52.8 % (ref 38–73)
NONHDLC SERPL-MCNC: 90 MG/DL
NRBC BLD-RTO: 0 /100 WBC
PLATELET # BLD AUTO: 204 K/UL (ref 150–450)
PMV BLD AUTO: 10.7 FL (ref 9.2–12.9)
POTASSIUM SERPL-SCNC: 4 MMOL/L (ref 3.5–5.1)
PROT SERPL-MCNC: 8.7 G/DL (ref 6–8.4)
RBC # BLD AUTO: 4.23 M/UL (ref 4–5.4)
SATURATED IRON: 23 % (ref 20–50)
SODIUM SERPL-SCNC: 140 MMOL/L (ref 136–145)
T4 FREE SERPL-MCNC: 0.92 NG/DL (ref 0.71–1.51)
TOTAL IRON BINDING CAPACITY: 240 UG/DL (ref 250–450)
TRANSFERRIN SERPL-MCNC: 162 MG/DL (ref 200–375)
TRIGL SERPL-MCNC: 71 MG/DL (ref 30–150)
TSH SERPL DL<=0.005 MIU/L-ACNC: 1.7 UIU/ML (ref 0.4–4)
WBC # BLD AUTO: 8.46 K/UL (ref 3.9–12.7)

## 2021-08-16 PROCEDURE — 84439 ASSAY OF FREE THYROXINE: CPT | Mod: PO | Performed by: FAMILY MEDICINE

## 2021-08-16 PROCEDURE — 80061 LIPID PANEL: CPT | Performed by: FAMILY MEDICINE

## 2021-08-16 PROCEDURE — 82306 VITAMIN D 25 HYDROXY: CPT | Performed by: FAMILY MEDICINE

## 2021-08-16 PROCEDURE — 85025 COMPLETE CBC W/AUTO DIFF WBC: CPT | Mod: PO | Performed by: FAMILY MEDICINE

## 2021-08-16 PROCEDURE — 83036 HEMOGLOBIN GLYCOSYLATED A1C: CPT | Performed by: FAMILY MEDICINE

## 2021-08-16 PROCEDURE — 82728 ASSAY OF FERRITIN: CPT | Performed by: FAMILY MEDICINE

## 2021-08-16 PROCEDURE — 80053 COMPREHEN METABOLIC PANEL: CPT | Mod: PO | Performed by: FAMILY MEDICINE

## 2021-08-16 PROCEDURE — 36415 COLL VENOUS BLD VENIPUNCTURE: CPT | Mod: PO | Performed by: FAMILY MEDICINE

## 2021-08-16 PROCEDURE — 83540 ASSAY OF IRON: CPT | Performed by: FAMILY MEDICINE

## 2021-08-16 PROCEDURE — 84443 ASSAY THYROID STIM HORMONE: CPT | Mod: PO | Performed by: FAMILY MEDICINE

## 2021-08-17 ENCOUNTER — PATIENT MESSAGE (OUTPATIENT)
Dept: INTERNAL MEDICINE | Facility: CLINIC | Age: 66
End: 2021-08-17

## 2021-11-15 PROBLEM — Z00.00 ROUTINE GENERAL MEDICAL EXAMINATION AT A HEALTH CARE FACILITY: Status: RESOLVED | Noted: 2017-11-29 | Resolved: 2021-11-15

## 2021-11-22 ENCOUNTER — PATIENT OUTREACH (OUTPATIENT)
Dept: ADMINISTRATIVE | Facility: HOSPITAL | Age: 66
End: 2021-11-22
Payer: COMMERCIAL

## 2021-12-30 ENCOUNTER — PATIENT MESSAGE (OUTPATIENT)
Dept: INTERNAL MEDICINE | Facility: CLINIC | Age: 66
End: 2021-12-30
Payer: COMMERCIAL

## 2022-01-13 ENCOUNTER — TELEPHONE (OUTPATIENT)
Dept: INTERNAL MEDICINE | Facility: CLINIC | Age: 67
End: 2022-01-13
Payer: COMMERCIAL

## 2022-01-13 NOTE — TELEPHONE ENCOUNTER
Per Dr Mays I called pt to make sure that she has gone back to have U/S and Mammo done at [facility]. I left a message on her Hm #

## 2022-01-18 ENCOUNTER — TELEPHONE (OUTPATIENT)
Dept: INTERNAL MEDICINE | Facility: CLINIC | Age: 67
End: 2022-01-18
Payer: COMMERCIAL

## 2022-01-19 ENCOUNTER — PATIENT MESSAGE (OUTPATIENT)
Dept: INTERNAL MEDICINE | Facility: CLINIC | Age: 67
End: 2022-01-19
Payer: COMMERCIAL

## 2022-01-21 ENCOUNTER — LAB VISIT (OUTPATIENT)
Dept: LAB | Facility: HOSPITAL | Age: 67
End: 2022-01-21
Attending: FAMILY MEDICINE
Payer: COMMERCIAL

## 2022-01-21 ENCOUNTER — OFFICE VISIT (OUTPATIENT)
Dept: INTERNAL MEDICINE | Facility: CLINIC | Age: 67
End: 2022-01-21
Payer: COMMERCIAL

## 2022-01-21 VITALS
WEIGHT: 261.25 LBS | HEART RATE: 64 BPM | SYSTOLIC BLOOD PRESSURE: 130 MMHG | TEMPERATURE: 98 F | BODY MASS INDEX: 42.17 KG/M2 | OXYGEN SATURATION: 99 % | DIASTOLIC BLOOD PRESSURE: 78 MMHG

## 2022-01-21 DIAGNOSIS — Z28.9 DELAYED IMMUNIZATIONS: ICD-10-CM

## 2022-01-21 DIAGNOSIS — E78.49 OTHER HYPERLIPIDEMIA: ICD-10-CM

## 2022-01-21 DIAGNOSIS — E66.01 MORBID OBESITY WITH BMI OF 40.0-44.9, ADULT: ICD-10-CM

## 2022-01-21 DIAGNOSIS — Z01.818 PREOP EXAMINATION: Primary | ICD-10-CM

## 2022-01-21 DIAGNOSIS — Z01.818 PREOP EXAMINATION: ICD-10-CM

## 2022-01-21 DIAGNOSIS — Z12.31 SCREENING MAMMOGRAM, ENCOUNTER FOR: ICD-10-CM

## 2022-01-21 DIAGNOSIS — I10 PRIMARY HYPERTENSION: ICD-10-CM

## 2022-01-21 PROBLEM — L03.90 CELLULITIS: Status: RESOLVED | Noted: 2019-07-31 | Resolved: 2022-01-21

## 2022-01-21 PROCEDURE — 3078F PR MOST RECENT DIASTOLIC BLOOD PRESSURE < 80 MM HG: ICD-10-PCS | Mod: CPTII,S$GLB,, | Performed by: FAMILY MEDICINE

## 2022-01-21 PROCEDURE — 1159F PR MEDICATION LIST DOCUMENTED IN MEDICAL RECORD: ICD-10-PCS | Mod: CPTII,S$GLB,, | Performed by: FAMILY MEDICINE

## 2022-01-21 PROCEDURE — 90732 PPSV23 VACC 2 YRS+ SUBQ/IM: CPT | Mod: S$GLB,,, | Performed by: FAMILY MEDICINE

## 2022-01-21 PROCEDURE — 3078F DIAST BP <80 MM HG: CPT | Mod: CPTII,S$GLB,, | Performed by: FAMILY MEDICINE

## 2022-01-21 PROCEDURE — 3075F PR MOST RECENT SYSTOLIC BLOOD PRESS GE 130-139MM HG: ICD-10-PCS | Mod: CPTII,S$GLB,, | Performed by: FAMILY MEDICINE

## 2022-01-21 PROCEDURE — 3008F BODY MASS INDEX DOCD: CPT | Mod: CPTII,S$GLB,, | Performed by: FAMILY MEDICINE

## 2022-01-21 PROCEDURE — 90471 PNEUMOCOCCAL POLYSACCHARIDE VACCINE 23-VALENT =>2YO SQ IM: ICD-10-PCS | Mod: S$GLB,,, | Performed by: FAMILY MEDICINE

## 2022-01-21 PROCEDURE — 99999 PR PBB SHADOW E&M-EST. PATIENT-LVL IV: ICD-10-PCS | Mod: PBBFAC,,, | Performed by: FAMILY MEDICINE

## 2022-01-21 PROCEDURE — 80053 COMPREHEN METABOLIC PANEL: CPT | Performed by: FAMILY MEDICINE

## 2022-01-21 PROCEDURE — 85025 COMPLETE CBC W/AUTO DIFF WBC: CPT | Performed by: FAMILY MEDICINE

## 2022-01-21 PROCEDURE — 1159F MED LIST DOCD IN RCRD: CPT | Mod: CPTII,S$GLB,, | Performed by: FAMILY MEDICINE

## 2022-01-21 PROCEDURE — 99999 PR PBB SHADOW E&M-EST. PATIENT-LVL IV: CPT | Mod: PBBFAC,,, | Performed by: FAMILY MEDICINE

## 2022-01-21 PROCEDURE — 3075F SYST BP GE 130 - 139MM HG: CPT | Mod: CPTII,S$GLB,, | Performed by: FAMILY MEDICINE

## 2022-01-21 PROCEDURE — 90471 IMMUNIZATION ADMIN: CPT | Mod: S$GLB,,, | Performed by: FAMILY MEDICINE

## 2022-01-21 PROCEDURE — 90732 PNEUMOCOCCAL POLYSACCHARIDE VACCINE 23-VALENT =>2YO SQ IM: ICD-10-PCS | Mod: S$GLB,,, | Performed by: FAMILY MEDICINE

## 2022-01-21 PROCEDURE — 99214 OFFICE O/P EST MOD 30 MIN: CPT | Mod: 25,S$GLB,, | Performed by: FAMILY MEDICINE

## 2022-01-21 PROCEDURE — 36415 COLL VENOUS BLD VENIPUNCTURE: CPT | Mod: PO | Performed by: FAMILY MEDICINE

## 2022-01-21 PROCEDURE — 3008F PR BODY MASS INDEX (BMI) DOCUMENTED: ICD-10-PCS | Mod: CPTII,S$GLB,, | Performed by: FAMILY MEDICINE

## 2022-01-21 PROCEDURE — 1126F AMNT PAIN NOTED NONE PRSNT: CPT | Mod: CPTII,S$GLB,, | Performed by: FAMILY MEDICINE

## 2022-01-21 PROCEDURE — 1126F PR PAIN SEVERITY QUANTIFIED, NO PAIN PRESENT: ICD-10-PCS | Mod: CPTII,S$GLB,, | Performed by: FAMILY MEDICINE

## 2022-01-21 PROCEDURE — 99214 PR OFFICE/OUTPT VISIT, EST, LEVL IV, 30-39 MIN: ICD-10-PCS | Mod: 25,S$GLB,, | Performed by: FAMILY MEDICINE

## 2022-01-21 NOTE — PROGRESS NOTES
Subjective:       Patient ID: Concepcion Don is a 66 y.o. female.    Chief Complaint: Pre-op Exam    Preop for Catract  Surgeon: Dr. Garcia  Date of surgery:2/3/2022    Review of Systems   Constitutional: Negative for fever.   HENT: Negative for congestion.    Eyes: Negative for discharge.   Respiratory: Negative for shortness of breath.    Cardiovascular: Negative for chest pain.   Gastrointestinal: Negative for abdominal pain.   Genitourinary: Negative for difficulty urinating.   Musculoskeletal: Negative for joint swelling.   Neurological: Negative for dizziness.   Psychiatric/Behavioral: Negative for agitation.       Objective:      Physical Exam  Vitals and nursing note reviewed.   Constitutional:       General: She is not in acute distress.     Appearance: Normal appearance. She is well-developed. She is not diaphoretic.   HENT:      Head: Normocephalic and atraumatic.   Eyes:      General: No scleral icterus.     Conjunctiva/sclera: Conjunctivae normal.   Pulmonary:      Effort: Pulmonary effort is normal. No respiratory distress.   Abdominal:      General: There is no distension.      Palpations: Abdomen is soft.      Tenderness: There is no abdominal tenderness. There is no guarding.   Skin:     General: Skin is warm.      Coloration: Skin is not pale.      Findings: No erythema or rash.      Comments: Good turgor   Neurological:      Mental Status: She is alert.   Psychiatric:         Mood and Affect: Mood normal.         Thought Content: Thought content normal.         Assessment:       1. Preop examination    2. Delayed immunizations    3. Screening mammogram, encounter for    4. Primary hypertension    5. Other hyperlipidemia    6. Morbid obesity with BMI of 40.0-44.9, adult        Plan:     Problem List Items Addressed This Visit        Cardiac/Vascular    Hypertension    Overview     Chronic, Stable, cont lotensin         Other hyperlipidemia    Overview     Chronic, Stable, cont statin             Renal/    Screening mammogram, encounter for       ID    Delayed immunizations    Relevant Orders    (In Office Administered) Pneumococcal Polysaccharide Vaccine (23 Valent) (SQ/IM) (Completed)       Endocrine    Morbid obesity with BMI of 40.0-44.9, adult    Overview     Do not eat starches. (nothing white)  Stop sugary snacks,  Stop bottled juices, or sodas.  See if you can lose any weight by stopping these items first, then we will re-visit the issue.              Other    Preop examination - Primary    Current Assessment & Plan     RCRI: 0   Pt cleared for surgery         Relevant Orders    CBC Auto Differential    Comprehensive Metabolic Panel

## 2022-01-22 LAB
ALBUMIN SERPL BCP-MCNC: 3.3 G/DL (ref 3.5–5.2)
ALP SERPL-CCNC: 132 U/L (ref 55–135)
ALT SERPL W/O P-5'-P-CCNC: 19 U/L (ref 10–44)
ANION GAP SERPL CALC-SCNC: 7 MMOL/L (ref 8–16)
AST SERPL-CCNC: 26 U/L (ref 10–40)
BASOPHILS # BLD AUTO: 0.05 K/UL (ref 0–0.2)
BASOPHILS NFR BLD: 0.6 % (ref 0–1.9)
BILIRUB SERPL-MCNC: 0.5 MG/DL (ref 0.1–1)
BUN SERPL-MCNC: 13 MG/DL (ref 8–23)
CALCIUM SERPL-MCNC: 9.8 MG/DL (ref 8.7–10.5)
CHLORIDE SERPL-SCNC: 104 MMOL/L (ref 95–110)
CO2 SERPL-SCNC: 26 MMOL/L (ref 23–29)
CREAT SERPL-MCNC: 0.8 MG/DL (ref 0.5–1.4)
DIFFERENTIAL METHOD: ABNORMAL
EOSINOPHIL # BLD AUTO: 0.2 K/UL (ref 0–0.5)
EOSINOPHIL NFR BLD: 2.8 % (ref 0–8)
ERYTHROCYTE [DISTWIDTH] IN BLOOD BY AUTOMATED COUNT: 14.6 % (ref 11.5–14.5)
EST. GFR  (AFRICAN AMERICAN): >60 ML/MIN/1.73 M^2
EST. GFR  (NON AFRICAN AMERICAN): >60 ML/MIN/1.73 M^2
GLUCOSE SERPL-MCNC: 79 MG/DL (ref 70–110)
HCT VFR BLD AUTO: 39.1 % (ref 37–48.5)
HGB BLD-MCNC: 11.8 G/DL (ref 12–16)
IMM GRANULOCYTES # BLD AUTO: 0.04 K/UL (ref 0–0.04)
IMM GRANULOCYTES NFR BLD AUTO: 0.5 % (ref 0–0.5)
LYMPHOCYTES # BLD AUTO: 3.3 K/UL (ref 1–4.8)
LYMPHOCYTES NFR BLD: 37.8 % (ref 18–48)
MCH RBC QN AUTO: 28.4 PG (ref 27–31)
MCHC RBC AUTO-ENTMCNC: 30.2 G/DL (ref 32–36)
MCV RBC AUTO: 94 FL (ref 82–98)
MONOCYTES # BLD AUTO: 0.8 K/UL (ref 0.3–1)
MONOCYTES NFR BLD: 9.4 % (ref 4–15)
NEUTROPHILS # BLD AUTO: 4.3 K/UL (ref 1.8–7.7)
NEUTROPHILS NFR BLD: 48.9 % (ref 38–73)
NRBC BLD-RTO: 0 /100 WBC
PLATELET # BLD AUTO: 221 K/UL (ref 150–450)
PMV BLD AUTO: 11.5 FL (ref 9.2–12.9)
POTASSIUM SERPL-SCNC: 4 MMOL/L (ref 3.5–5.1)
PROT SERPL-MCNC: 8.5 G/DL (ref 6–8.4)
RBC # BLD AUTO: 4.16 M/UL (ref 4–5.4)
SODIUM SERPL-SCNC: 137 MMOL/L (ref 136–145)
WBC # BLD AUTO: 8.71 K/UL (ref 3.9–12.7)

## 2022-01-24 NOTE — PROGRESS NOTES
Please let patient know that she has mild elevation in total protein, and mildly decreased albumin, nothing significant at this time though.  Mild anemia as well but nothing significant once again.  Overall labs look stable.  Keep up the good work

## 2022-01-25 ENCOUNTER — PATIENT MESSAGE (OUTPATIENT)
Dept: INTERNAL MEDICINE | Facility: CLINIC | Age: 67
End: 2022-01-25
Payer: COMMERCIAL

## 2022-01-26 ENCOUNTER — TELEPHONE (OUTPATIENT)
Dept: INTERNAL MEDICINE | Facility: CLINIC | Age: 67
End: 2022-01-26
Payer: COMMERCIAL

## 2022-01-26 NOTE — TELEPHONE ENCOUNTER
----- Message from Christiano Mays MD sent at 1/24/2022  7:16 AM CST -----  Please let patient know that she has mild elevation in total protein, and mildly decreased albumin, nothing significant at this time though.  Mild anemia as well but nothing significant once again.  Overall labs look stable.  Keep up the good work

## 2022-01-27 ENCOUNTER — TELEPHONE (OUTPATIENT)
Dept: INTERNAL MEDICINE | Facility: CLINIC | Age: 67
End: 2022-01-27
Payer: COMMERCIAL

## 2022-02-07 ENCOUNTER — PATIENT OUTREACH (OUTPATIENT)
Dept: ADMINISTRATIVE | Facility: HOSPITAL | Age: 67
End: 2022-02-07
Payer: COMMERCIAL

## 2022-05-11 ENCOUNTER — OFFICE VISIT (OUTPATIENT)
Dept: INTERNAL MEDICINE | Facility: CLINIC | Age: 67
End: 2022-05-11
Payer: COMMERCIAL

## 2022-05-11 VITALS
HEART RATE: 65 BPM | SYSTOLIC BLOOD PRESSURE: 118 MMHG | TEMPERATURE: 98 F | BODY MASS INDEX: 45.07 KG/M2 | WEIGHT: 270.5 LBS | HEIGHT: 65 IN | DIASTOLIC BLOOD PRESSURE: 72 MMHG

## 2022-05-11 DIAGNOSIS — M43.10 SPONDYLOLISTHESIS, UNSPECIFIED SPINAL REGION: ICD-10-CM

## 2022-05-11 DIAGNOSIS — S16.1XXA STRAIN OF NECK MUSCLE, INITIAL ENCOUNTER: Primary | ICD-10-CM

## 2022-05-11 DIAGNOSIS — I10 PRIMARY HYPERTENSION: ICD-10-CM

## 2022-05-11 DIAGNOSIS — E78.49 OTHER HYPERLIPIDEMIA: ICD-10-CM

## 2022-05-11 PROBLEM — Z01.818 PREOP EXAMINATION: Status: RESOLVED | Noted: 2022-01-21 | Resolved: 2022-05-11

## 2022-05-11 PROCEDURE — 1101F PT FALLS ASSESS-DOCD LE1/YR: CPT | Mod: CPTII,S$GLB,, | Performed by: FAMILY MEDICINE

## 2022-05-11 PROCEDURE — 99214 OFFICE O/P EST MOD 30 MIN: CPT | Mod: 25,S$GLB,, | Performed by: FAMILY MEDICINE

## 2022-05-11 PROCEDURE — 96372 PR INJECTION,THERAP/PROPH/DIAG2ST, IM OR SUBCUT: ICD-10-PCS | Mod: S$GLB,,, | Performed by: FAMILY MEDICINE

## 2022-05-11 PROCEDURE — 4010F PR ACE/ARB THEARPY RXD/TAKEN: ICD-10-PCS | Mod: CPTII,S$GLB,, | Performed by: FAMILY MEDICINE

## 2022-05-11 PROCEDURE — 1101F PR PT FALLS ASSESS DOC 0-1 FALLS W/OUT INJ PAST YR: ICD-10-PCS | Mod: CPTII,S$GLB,, | Performed by: FAMILY MEDICINE

## 2022-05-11 PROCEDURE — 1125F AMNT PAIN NOTED PAIN PRSNT: CPT | Mod: CPTII,S$GLB,, | Performed by: FAMILY MEDICINE

## 2022-05-11 PROCEDURE — 99999 PR PBB SHADOW E&M-EST. PATIENT-LVL IV: ICD-10-PCS | Mod: PBBFAC,,, | Performed by: FAMILY MEDICINE

## 2022-05-11 PROCEDURE — 1125F PR PAIN SEVERITY QUANTIFIED, PAIN PRESENT: ICD-10-PCS | Mod: CPTII,S$GLB,, | Performed by: FAMILY MEDICINE

## 2022-05-11 PROCEDURE — 4010F ACE/ARB THERAPY RXD/TAKEN: CPT | Mod: CPTII,S$GLB,, | Performed by: FAMILY MEDICINE

## 2022-05-11 PROCEDURE — 99214 PR OFFICE/OUTPT VISIT, EST, LEVL IV, 30-39 MIN: ICD-10-PCS | Mod: 25,S$GLB,, | Performed by: FAMILY MEDICINE

## 2022-05-11 PROCEDURE — 3078F DIAST BP <80 MM HG: CPT | Mod: CPTII,S$GLB,, | Performed by: FAMILY MEDICINE

## 2022-05-11 PROCEDURE — 96372 THER/PROPH/DIAG INJ SC/IM: CPT | Mod: S$GLB,,, | Performed by: FAMILY MEDICINE

## 2022-05-11 PROCEDURE — 1160F RVW MEDS BY RX/DR IN RCRD: CPT | Mod: CPTII,S$GLB,, | Performed by: FAMILY MEDICINE

## 2022-05-11 PROCEDURE — 1159F MED LIST DOCD IN RCRD: CPT | Mod: CPTII,S$GLB,, | Performed by: FAMILY MEDICINE

## 2022-05-11 PROCEDURE — 3008F PR BODY MASS INDEX (BMI) DOCUMENTED: ICD-10-PCS | Mod: CPTII,S$GLB,, | Performed by: FAMILY MEDICINE

## 2022-05-11 PROCEDURE — 3288F FALL RISK ASSESSMENT DOCD: CPT | Mod: CPTII,S$GLB,, | Performed by: FAMILY MEDICINE

## 2022-05-11 PROCEDURE — 3288F PR FALLS RISK ASSESSMENT DOCUMENTED: ICD-10-PCS | Mod: CPTII,S$GLB,, | Performed by: FAMILY MEDICINE

## 2022-05-11 PROCEDURE — 1159F PR MEDICATION LIST DOCUMENTED IN MEDICAL RECORD: ICD-10-PCS | Mod: CPTII,S$GLB,, | Performed by: FAMILY MEDICINE

## 2022-05-11 PROCEDURE — 1160F PR REVIEW ALL MEDS BY PRESCRIBER/CLIN PHARMACIST DOCUMENTED: ICD-10-PCS | Mod: CPTII,S$GLB,, | Performed by: FAMILY MEDICINE

## 2022-05-11 PROCEDURE — 3074F SYST BP LT 130 MM HG: CPT | Mod: CPTII,S$GLB,, | Performed by: FAMILY MEDICINE

## 2022-05-11 PROCEDURE — 3074F PR MOST RECENT SYSTOLIC BLOOD PRESSURE < 130 MM HG: ICD-10-PCS | Mod: CPTII,S$GLB,, | Performed by: FAMILY MEDICINE

## 2022-05-11 PROCEDURE — 3078F PR MOST RECENT DIASTOLIC BLOOD PRESSURE < 80 MM HG: ICD-10-PCS | Mod: CPTII,S$GLB,, | Performed by: FAMILY MEDICINE

## 2022-05-11 PROCEDURE — 99999 PR PBB SHADOW E&M-EST. PATIENT-LVL IV: CPT | Mod: PBBFAC,,, | Performed by: FAMILY MEDICINE

## 2022-05-11 PROCEDURE — 3008F BODY MASS INDEX DOCD: CPT | Mod: CPTII,S$GLB,, | Performed by: FAMILY MEDICINE

## 2022-05-11 RX ORDER — KETOROLAC TROMETHAMINE 30 MG/ML
60 INJECTION, SOLUTION INTRAMUSCULAR; INTRAVENOUS
Status: COMPLETED | OUTPATIENT
Start: 2022-05-11 | End: 2022-05-11

## 2022-05-11 RX ORDER — MELOXICAM 15 MG/1
15 TABLET ORAL DAILY
Qty: 30 TABLET | Refills: 0 | Status: SHIPPED | OUTPATIENT
Start: 2022-05-11 | End: 2022-08-23 | Stop reason: SDUPTHER

## 2022-05-11 RX ORDER — ORPHENADRINE CITRATE 30 MG/ML
60 INJECTION INTRAMUSCULAR; INTRAVENOUS
Status: COMPLETED | OUTPATIENT
Start: 2022-05-11 | End: 2022-05-11

## 2022-05-11 RX ORDER — CYCLOBENZAPRINE HCL 10 MG
10 TABLET ORAL NIGHTLY
Qty: 30 TABLET | Refills: 0 | Status: SHIPPED | OUTPATIENT
Start: 2022-05-11 | End: 2022-06-10

## 2022-05-11 RX ADMIN — KETOROLAC TROMETHAMINE 60 MG: 30 INJECTION, SOLUTION INTRAMUSCULAR; INTRAVENOUS at 10:05

## 2022-05-11 RX ADMIN — ORPHENADRINE CITRATE 60 MG: 30 INJECTION INTRAMUSCULAR; INTRAVENOUS at 10:05

## 2022-05-11 NOTE — PROGRESS NOTES
Subjective:       Patient ID: Concepcion Don is a 66 y.o. female.    Chief Complaint: Neck Pain and Headache (Recurrent for about a month)    Neck Injury   This is a new problem. The current episode started 1 to 4 weeks ago. The problem occurs constantly. The problem has been gradually worsening. The quality of the pain is described as aching. Pertinent negatives include no chest pain.     Review of Systems   Respiratory: Negative for shortness of breath.    Cardiovascular: Negative for chest pain.   Gastrointestinal: Negative for abdominal pain.   Musculoskeletal: Positive for arthralgias and myalgias.       Objective:      Physical Exam  Vitals and nursing note reviewed.   Constitutional:       General: She is not in acute distress.     Appearance: Normal appearance. She is well-developed. She is not diaphoretic.   HENT:      Head: Normocephalic and atraumatic.   Pulmonary:      Effort: Pulmonary effort is normal. No respiratory distress.      Breath sounds: Normal breath sounds. No wheezing.   Musculoskeletal:      Comments: aguillon rom of neck   Skin:     General: Skin is warm and dry.      Findings: No erythema or rash.   Neurological:      Mental Status: She is alert.         Assessment:       1. Strain of neck muscle, initial encounter    2. Spondylolisthesis, unspecified spinal region    3. Primary hypertension    4. Other hyperlipidemia        Plan:     Problem List Items Addressed This Visit        Cardiac/Vascular    Hypertension    Overview     Chronic, Stable, cont lotensin           Other hyperlipidemia    Overview     Chronic, Stable, cont statin              Orthopedic    Cervical strain - Primary    Current Assessment & Plan     Toradol injection. Do not take aspirin or nsaids until 48 hrs after injection.    Norflex injection. Do not take flexeril until 48 hrs after injection.                 Relevant Medications    meloxicam (MOBIC) 15 MG tablet    cyclobenzaprine (FLEXERIL) 10 MG tablet    ketorolac  injection 60 mg (Start on 5/11/2022 10:45 AM)    orphenadrine injection 60 mg (Start on 5/11/2022 10:45 AM)    Spondylolisthesis    Current Assessment & Plan     Ref PT

## 2022-05-11 NOTE — ASSESSMENT & PLAN NOTE
Toradol injection. Do not take aspirin or nsaids until 48 hrs after injection.    Norflex injection. Do not take flexeril until 48 hrs after injection.

## 2022-06-10 NOTE — PROGRESS NOTES
-- DO NOT REPLY / DO NOT REPLY ALL --  -- Message is from the [facility]--    Order Request  DME - Durable Medical Supply - cpap supplies    Message / reason: patient needs more supplies     Insurance type: verified   Payor: [employer] / Plan: [employer] PRESSLEY [id number] / Product Type: [employer]    Preferred Delivery Method   Mail to home (confirm address is correct in Epic demographics)     Caller Information       Type Contact Phone    06/10/2022 03:53 PM CDT Phone (Incoming) Jaz Ware (Self) [phone] (R)          Alternative phone number: none    Turnaround time given to caller:   \"This message will be sent to [state Provider's name]. The clinical team will fulfill your request as soon as they review your message.\"   Subjective:       Patient ID: Concepcion Don is a 63 y.o. female.    Chief Complaint: Chest Pain (upper chest between breast, mostly when breathing in.)    CP onset when she picked up her grandson.  No ESPARZA.      Chest Pain    This is a new problem. Episode onset: 2 weeks. The onset quality is sudden. The problem occurs constantly. The problem has been waxing and waning. The pain is moderate. The quality of the pain is described as pressure. The pain does not radiate. Pertinent negatives include no abdominal pain, back pain, cough, dizziness, fever, orthopnea, palpitations, shortness of breath, vomiting or weakness.     Review of Systems   Constitutional: Negative for fever.   HENT: Negative for congestion.    Eyes: Negative for discharge.   Respiratory: Negative for cough and shortness of breath.    Cardiovascular: Positive for chest pain and leg swelling. Negative for palpitations and orthopnea.   Gastrointestinal: Negative for abdominal pain and vomiting.   Genitourinary: Negative for difficulty urinating.   Musculoskeletal: Negative for back pain and joint swelling.   Neurological: Negative for dizziness and weakness.   Psychiatric/Behavioral: Negative for agitation.       Objective:      Physical Exam   Constitutional: She appears well-developed and well-nourished. No distress.   HENT:   Head: Normocephalic and atraumatic.   Eyes: Pupils are equal, round, and reactive to light. Conjunctivae are normal. Right eye exhibits no discharge. Left eye exhibits no discharge.   Cardiovascular: Normal rate, regular rhythm, normal heart sounds and intact distal pulses.   Pulmonary/Chest: Effort normal and breath sounds normal. No respiratory distress. She has no wheezes.   Abdominal: Soft. She exhibits no distension. There is no tenderness.   Musculoskeletal: She exhibits edema.   Skin: Skin is warm and dry. No rash noted. She is not diaphoretic. No erythema.   Psychiatric: She has a normal mood and affect. Her behavior is  normal. Thought content normal.   Nursing note and vitals reviewed.      Assessment:       1. Chest pain    2. Essential hypertension    3. Chest discomfort    4. Encounter for long-term (current) use of medications    5. Thyroid disease    6. Morbid obesity with BMI of 40.0-44.9, adult        Plan:     Problem List Items Addressed This Visit        Psychiatric    Encounter for long-term (current) use of medications    Relevant Orders    TSH (Completed)       Cardiac/Vascular    Hypertension       Endocrine    Thyroid disease    Relevant Medications    levothyroxine (SYNTHROID) 25 MCG tablet    Other Relevant Orders    TSH (Completed)    Morbid obesity with BMI of 40.0-44.9, adult    Overview     Do not eat starches. (nothing white)  Stop sugary snacks,  Stop bottled juices, or sodas.  See if you can lose any weight by stopping these items first, then we will re-visit the issue.              Other    Chest discomfort    Relevant Orders    IN OFFICE EKG 12-LEAD (to [place]) (Completed)    Ambulatory referral to Cardiology      Other Visit Diagnoses     Chest pain    -  Primary    Relevant Orders    IN OFFICE EKG 12-LEAD (to [facility]) (Completed)    CBC auto differential (Completed)    Comprehensive metabolic panel (Completed)    Lipid panel (Completed)    TSH (Completed)    CK-MB (Completed)    X-Ray Chest PA And Lateral (Completed)    D dimer, quantitative (Completed)    Brain natriuretic peptide (Completed)    Troponin I (Completed)

## 2022-08-23 ENCOUNTER — OFFICE VISIT (OUTPATIENT)
Dept: INTERNAL MEDICINE | Facility: CLINIC | Age: 67
End: 2022-08-23
Payer: COMMERCIAL

## 2022-08-23 ENCOUNTER — LAB VISIT (OUTPATIENT)
Dept: LAB | Facility: HOSPITAL | Age: 67
End: 2022-08-23
Attending: FAMILY MEDICINE
Payer: COMMERCIAL

## 2022-08-23 VITALS
TEMPERATURE: 98 F | DIASTOLIC BLOOD PRESSURE: 84 MMHG | WEIGHT: 269.38 LBS | HEART RATE: 66 BPM | SYSTOLIC BLOOD PRESSURE: 122 MMHG | BODY MASS INDEX: 44.83 KG/M2 | OXYGEN SATURATION: 99 %

## 2022-08-23 DIAGNOSIS — E07.9 THYROID DISEASE: ICD-10-CM

## 2022-08-23 DIAGNOSIS — S16.1XXA STRAIN OF NECK MUSCLE, INITIAL ENCOUNTER: ICD-10-CM

## 2022-08-23 DIAGNOSIS — I10 ESSENTIAL HYPERTENSION: ICD-10-CM

## 2022-08-23 DIAGNOSIS — E78.49 OTHER HYPERLIPIDEMIA: ICD-10-CM

## 2022-08-23 DIAGNOSIS — R21 RASH: ICD-10-CM

## 2022-08-23 DIAGNOSIS — Z00.00 WELLNESS EXAMINATION: Primary | ICD-10-CM

## 2022-08-23 DIAGNOSIS — Z00.00 WELLNESS EXAMINATION: ICD-10-CM

## 2022-08-23 PROBLEM — M54.41 RIGHT-SIDED LOW BACK PAIN WITH RIGHT-SIDED SCIATICA: Status: RESOLVED | Noted: 2020-03-20 | Resolved: 2022-08-23

## 2022-08-23 PROBLEM — M25.572 CHRONIC PAIN OF LEFT ANKLE: Status: RESOLVED | Noted: 2021-02-23 | Resolved: 2022-08-23

## 2022-08-23 PROBLEM — G89.29 CHRONIC PAIN OF LEFT ANKLE: Status: RESOLVED | Noted: 2021-02-23 | Resolved: 2022-08-23

## 2022-08-23 PROBLEM — M76.62 ACHILLES TENDINITIS OF LEFT LOWER EXTREMITY: Status: RESOLVED | Noted: 2021-02-23 | Resolved: 2022-08-23

## 2022-08-23 PROBLEM — M79.604: Status: RESOLVED | Noted: 2020-03-13 | Resolved: 2022-08-23

## 2022-08-23 PROBLEM — Z79.899 ENCOUNTER FOR LONG-TERM (CURRENT) USE OF MEDICATIONS: Status: RESOLVED | Noted: 2019-01-14 | Resolved: 2022-08-23

## 2022-08-23 PROBLEM — R26.9 ABNORMALITY OF GAIT AND MOBILITY: Status: RESOLVED | Noted: 2021-02-23 | Resolved: 2022-08-23

## 2022-08-23 PROBLEM — Z28.9 DELAYED IMMUNIZATIONS: Status: RESOLVED | Noted: 2017-10-31 | Resolved: 2022-08-23

## 2022-08-23 PROBLEM — R60.0 LOCALIZED EDEMA: Status: RESOLVED | Noted: 2019-04-10 | Resolved: 2022-08-23

## 2022-08-23 PROBLEM — M25.551 ACUTE PAIN OF RIGHT HIP: Status: RESOLVED | Noted: 2020-03-19 | Resolved: 2022-08-23

## 2022-08-23 PROBLEM — R43.9 SMELL AND TASTE DISORDER: Status: RESOLVED | Noted: 2020-04-27 | Resolved: 2022-08-23

## 2022-08-23 LAB
ALBUMIN SERPL BCP-MCNC: 3.6 G/DL (ref 3.5–5.2)
ALP SERPL-CCNC: 124 U/L (ref 55–135)
ALT SERPL W/O P-5'-P-CCNC: 16 U/L (ref 10–44)
ANION GAP SERPL CALC-SCNC: 7 MMOL/L (ref 8–16)
AST SERPL-CCNC: 23 U/L (ref 10–40)
BASOPHILS # BLD AUTO: 0.04 K/UL (ref 0–0.2)
BASOPHILS NFR BLD: 0.5 % (ref 0–1.9)
BILIRUB SERPL-MCNC: 0.8 MG/DL (ref 0.1–1)
BUN SERPL-MCNC: 12 MG/DL (ref 8–23)
CALCIUM SERPL-MCNC: 9.6 MG/DL (ref 8.7–10.5)
CHLORIDE SERPL-SCNC: 102 MMOL/L (ref 95–110)
CHOLEST SERPL-MCNC: 131 MG/DL (ref 120–199)
CHOLEST/HDLC SERPL: 2.9 {RATIO} (ref 2–5)
CO2 SERPL-SCNC: 28 MMOL/L (ref 23–29)
CREAT SERPL-MCNC: 0.8 MG/DL (ref 0.5–1.4)
DIFFERENTIAL METHOD: ABNORMAL
EOSINOPHIL # BLD AUTO: 0.1 K/UL (ref 0–0.5)
EOSINOPHIL NFR BLD: 1.3 % (ref 0–8)
ERYTHROCYTE [DISTWIDTH] IN BLOOD BY AUTOMATED COUNT: 14.6 % (ref 11.5–14.5)
EST. GFR  (NO RACE VARIABLE): >60 ML/MIN/1.73 M^2
ESTIMATED AVG GLUCOSE: 103 MG/DL (ref 68–131)
FERRITIN SERPL-MCNC: 240 NG/ML (ref 20–300)
GLUCOSE SERPL-MCNC: 77 MG/DL (ref 70–110)
HBA1C MFR BLD: 5.2 % (ref 4–5.6)
HCT VFR BLD AUTO: 39.1 % (ref 37–48.5)
HDLC SERPL-MCNC: 45 MG/DL (ref 40–75)
HDLC SERPL: 34.4 % (ref 20–50)
HGB BLD-MCNC: 12.3 G/DL (ref 12–16)
IMM GRANULOCYTES # BLD AUTO: 0.02 K/UL (ref 0–0.04)
IMM GRANULOCYTES NFR BLD AUTO: 0.2 % (ref 0–0.5)
IRON SERPL-MCNC: 51 UG/DL (ref 30–160)
LDLC SERPL CALC-MCNC: 73 MG/DL (ref 63–159)
LYMPHOCYTES # BLD AUTO: 3.3 K/UL (ref 1–4.8)
LYMPHOCYTES NFR BLD: 39.1 % (ref 18–48)
MCH RBC QN AUTO: 28.6 PG (ref 27–31)
MCHC RBC AUTO-ENTMCNC: 31.5 G/DL (ref 32–36)
MCV RBC AUTO: 91 FL (ref 82–98)
MONOCYTES # BLD AUTO: 0.9 K/UL (ref 0.3–1)
MONOCYTES NFR BLD: 10.7 % (ref 4–15)
NEUTROPHILS # BLD AUTO: 4.1 K/UL (ref 1.8–7.7)
NEUTROPHILS NFR BLD: 48.2 % (ref 38–73)
NONHDLC SERPL-MCNC: 86 MG/DL
NRBC BLD-RTO: 0 /100 WBC
PLATELET # BLD AUTO: 220 K/UL (ref 150–450)
PMV BLD AUTO: 11.6 FL (ref 9.2–12.9)
POTASSIUM SERPL-SCNC: 4 MMOL/L (ref 3.5–5.1)
PROT SERPL-MCNC: 8.7 G/DL (ref 6–8.4)
RBC # BLD AUTO: 4.3 M/UL (ref 4–5.4)
SATURATED IRON: 20 % (ref 20–50)
SODIUM SERPL-SCNC: 137 MMOL/L (ref 136–145)
T4 FREE SERPL-MCNC: 0.92 NG/DL (ref 0.71–1.51)
TOTAL IRON BINDING CAPACITY: 260 UG/DL (ref 250–450)
TRANSFERRIN SERPL-MCNC: 176 MG/DL (ref 200–375)
TRIGL SERPL-MCNC: 65 MG/DL (ref 30–150)
TSH SERPL DL<=0.005 MIU/L-ACNC: 0.57 UIU/ML (ref 0.4–4)
VIT B12 SERPL-MCNC: 643 PG/ML (ref 210–950)
WBC # BLD AUTO: 8.44 K/UL (ref 3.9–12.7)

## 2022-08-23 PROCEDURE — 99999 PR PBB SHADOW E&M-EST. PATIENT-LVL III: CPT | Mod: PBBFAC,,, | Performed by: FAMILY MEDICINE

## 2022-08-23 PROCEDURE — 84439 ASSAY OF FREE THYROXINE: CPT | Performed by: FAMILY MEDICINE

## 2022-08-23 PROCEDURE — 3288F PR FALLS RISK ASSESSMENT DOCUMENTED: ICD-10-PCS | Mod: CPTII,S$GLB,, | Performed by: FAMILY MEDICINE

## 2022-08-23 PROCEDURE — 3074F SYST BP LT 130 MM HG: CPT | Mod: CPTII,S$GLB,, | Performed by: FAMILY MEDICINE

## 2022-08-23 PROCEDURE — 3079F PR MOST RECENT DIASTOLIC BLOOD PRESSURE 80-89 MM HG: ICD-10-PCS | Mod: CPTII,S$GLB,, | Performed by: FAMILY MEDICINE

## 2022-08-23 PROCEDURE — 4010F ACE/ARB THERAPY RXD/TAKEN: CPT | Mod: CPTII,S$GLB,, | Performed by: FAMILY MEDICINE

## 2022-08-23 PROCEDURE — 1159F PR MEDICATION LIST DOCUMENTED IN MEDICAL RECORD: ICD-10-PCS | Mod: CPTII,S$GLB,, | Performed by: FAMILY MEDICINE

## 2022-08-23 PROCEDURE — 82607 VITAMIN B-12: CPT | Performed by: FAMILY MEDICINE

## 2022-08-23 PROCEDURE — 1101F PT FALLS ASSESS-DOCD LE1/YR: CPT | Mod: CPTII,S$GLB,, | Performed by: FAMILY MEDICINE

## 2022-08-23 PROCEDURE — 99999 PR PBB SHADOW E&M-EST. PATIENT-LVL III: ICD-10-PCS | Mod: PBBFAC,,, | Performed by: FAMILY MEDICINE

## 2022-08-23 PROCEDURE — 1159F MED LIST DOCD IN RCRD: CPT | Mod: CPTII,S$GLB,, | Performed by: FAMILY MEDICINE

## 2022-08-23 PROCEDURE — 3008F PR BODY MASS INDEX (BMI) DOCUMENTED: ICD-10-PCS | Mod: CPTII,S$GLB,, | Performed by: FAMILY MEDICINE

## 2022-08-23 PROCEDURE — 80061 LIPID PANEL: CPT | Performed by: FAMILY MEDICINE

## 2022-08-23 PROCEDURE — 1126F PR PAIN SEVERITY QUANTIFIED, NO PAIN PRESENT: ICD-10-PCS | Mod: CPTII,S$GLB,, | Performed by: FAMILY MEDICINE

## 2022-08-23 PROCEDURE — 84466 ASSAY OF TRANSFERRIN: CPT | Performed by: FAMILY MEDICINE

## 2022-08-23 PROCEDURE — 80053 COMPREHEN METABOLIC PANEL: CPT | Performed by: FAMILY MEDICINE

## 2022-08-23 PROCEDURE — 82728 ASSAY OF FERRITIN: CPT | Performed by: FAMILY MEDICINE

## 2022-08-23 PROCEDURE — 99397 PR PREVENTIVE VISIT,EST,65 & OVER: ICD-10-PCS | Mod: S$GLB,,, | Performed by: FAMILY MEDICINE

## 2022-08-23 PROCEDURE — 3288F FALL RISK ASSESSMENT DOCD: CPT | Mod: CPTII,S$GLB,, | Performed by: FAMILY MEDICINE

## 2022-08-23 PROCEDURE — 1126F AMNT PAIN NOTED NONE PRSNT: CPT | Mod: CPTII,S$GLB,, | Performed by: FAMILY MEDICINE

## 2022-08-23 PROCEDURE — 36415 COLL VENOUS BLD VENIPUNCTURE: CPT | Mod: PO | Performed by: FAMILY MEDICINE

## 2022-08-23 PROCEDURE — 99397 PER PM REEVAL EST PAT 65+ YR: CPT | Mod: S$GLB,,, | Performed by: FAMILY MEDICINE

## 2022-08-23 PROCEDURE — 3074F PR MOST RECENT SYSTOLIC BLOOD PRESSURE < 130 MM HG: ICD-10-PCS | Mod: CPTII,S$GLB,, | Performed by: FAMILY MEDICINE

## 2022-08-23 PROCEDURE — 3079F DIAST BP 80-89 MM HG: CPT | Mod: CPTII,S$GLB,, | Performed by: FAMILY MEDICINE

## 2022-08-23 PROCEDURE — 4010F PR ACE/ARB THEARPY RXD/TAKEN: ICD-10-PCS | Mod: CPTII,S$GLB,, | Performed by: FAMILY MEDICINE

## 2022-08-23 PROCEDURE — 83036 HEMOGLOBIN GLYCOSYLATED A1C: CPT | Performed by: FAMILY MEDICINE

## 2022-08-23 PROCEDURE — 84443 ASSAY THYROID STIM HORMONE: CPT | Performed by: FAMILY MEDICINE

## 2022-08-23 PROCEDURE — 1101F PR PT FALLS ASSESS DOC 0-1 FALLS W/OUT INJ PAST YR: ICD-10-PCS | Mod: CPTII,S$GLB,, | Performed by: FAMILY MEDICINE

## 2022-08-23 PROCEDURE — 3008F BODY MASS INDEX DOCD: CPT | Mod: CPTII,S$GLB,, | Performed by: FAMILY MEDICINE

## 2022-08-23 PROCEDURE — 85025 COMPLETE CBC W/AUTO DIFF WBC: CPT | Performed by: FAMILY MEDICINE

## 2022-08-23 RX ORDER — TRIAMCINOLONE ACETONIDE 5 MG/G
OINTMENT TOPICAL 2 TIMES DAILY
Qty: 15 G | Refills: 1 | Status: SHIPPED | OUTPATIENT
Start: 2022-08-23 | End: 2023-08-24

## 2022-08-23 RX ORDER — LEVOTHYROXINE SODIUM 25 UG/1
25 TABLET ORAL DAILY
Qty: 90 TABLET | Refills: 3 | Status: SHIPPED | OUTPATIENT
Start: 2022-08-23 | End: 2023-08-24 | Stop reason: SDUPTHER

## 2022-08-23 RX ORDER — HYDROXYZINE HYDROCHLORIDE 25 MG/1
25 TABLET, FILM COATED ORAL 3 TIMES DAILY PRN
Qty: 90 TABLET | Refills: 0 | Status: SHIPPED | OUTPATIENT
Start: 2022-08-23 | End: 2023-08-24

## 2022-08-23 RX ORDER — MUPIROCIN 20 MG/G
OINTMENT TOPICAL 3 TIMES DAILY
Qty: 30 G | Refills: 5 | Status: SHIPPED | OUTPATIENT
Start: 2022-08-23 | End: 2023-08-24

## 2022-08-23 RX ORDER — FLUTICASONE PROPIONATE 50 MCG
1 SPRAY, SUSPENSION (ML) NASAL DAILY
Qty: 16 G | Refills: 11 | Status: SHIPPED | OUTPATIENT
Start: 2022-08-23 | End: 2023-08-24 | Stop reason: SDUPTHER

## 2022-08-23 RX ORDER — BENAZEPRIL HYDROCHLORIDE AND HYDROCHLOROTHIAZIDE 20; 12.5 MG/1; MG/1
1 TABLET ORAL DAILY
Qty: 90 TABLET | Refills: 3 | Status: SHIPPED | OUTPATIENT
Start: 2022-08-23 | End: 2023-08-24 | Stop reason: SDUPTHER

## 2022-08-23 RX ORDER — MELOXICAM 15 MG/1
15 TABLET ORAL DAILY
Qty: 30 TABLET | Refills: 1 | Status: SHIPPED | OUTPATIENT
Start: 2022-08-23 | End: 2023-04-11 | Stop reason: SDUPTHER

## 2022-08-23 RX ORDER — CETIRIZINE HYDROCHLORIDE 10 MG/1
10 TABLET ORAL NIGHTLY
Qty: 90 TABLET | Refills: 3 | Status: SHIPPED | OUTPATIENT
Start: 2022-08-23 | End: 2023-08-24 | Stop reason: SDUPTHER

## 2022-08-23 RX ORDER — ATORVASTATIN CALCIUM 20 MG/1
20 TABLET, FILM COATED ORAL DAILY
Qty: 90 TABLET | Refills: 3 | Status: SHIPPED | OUTPATIENT
Start: 2022-08-23 | End: 2023-08-24 | Stop reason: SDUPTHER

## 2022-08-23 RX ORDER — ASPIRIN 81 MG/1
81 TABLET ORAL DAILY
Qty: 360 TABLET | Refills: 1 | Status: SHIPPED | OUTPATIENT
Start: 2022-08-23 | End: 2023-09-28

## 2022-08-23 NOTE — PROGRESS NOTES
Subjective:       Patient ID: Concepcion Don is a 67 y.o. female.    Chief Complaint: Annual Exam    Concepcion Don is a 67 y.o. female and is here for a comprehensive physical exam.    Do you take any herbs or supplements that were not prescribed by a doctor? Yes - vit d2, caltrate  Are you taking calcium supplements? yes  Are you taking aspirin daily? yes     History:  Any STD's in the past? none    The following portions of the patient's history were reviewed and updated as appropriate: allergies, current medications, past family history, past medical history, past social history, past surgical history and problem list.    Review of Systems  Do you have pain that bothers you in your daily life? Knee pain.  Pertinent items are noted in HPI.      2. Patient Counseling:  --Nutrition: Stressed importance of moderation in sodium/caffeine intake, saturated fat and cholesterol, caloric balance.  --Exercise: Stressed the importance of regular exercise.   --Substance Abuse: Discussed cessation/primary prevention of tobacco, alcohol - etoh occ.   --Sexuality: Discussed sexually transmitted disease.  --Injury prevention: Discussed safety belts, smoke detector.   --Dental health: Discussed dental health.  --Immunizations reviewed.    3. Discussed the patient's BMI.  4. Follow up as needed for acute illness      Review of Systems   Constitutional: Negative for fever.   HENT: Negative for congestion.    Eyes: Negative for discharge.   Respiratory: Negative for shortness of breath.    Cardiovascular: Negative for chest pain.   Gastrointestinal: Negative for abdominal pain.   Genitourinary: Negative for difficulty urinating.   Musculoskeletal: Negative for joint swelling.   Neurological: Negative for dizziness.   Psychiatric/Behavioral: Negative for agitation.       Objective:      Physical Exam  Vitals and nursing note reviewed.   Constitutional:       General: She is not in acute distress.     Appearance: Normal appearance. She  is well-developed. She is not diaphoretic.   HENT:      Head: Normocephalic and atraumatic.   Eyes:      General: No scleral icterus.     Conjunctiva/sclera: Conjunctivae normal.   Cardiovascular:      Rate and Rhythm: Normal rate and regular rhythm.   Pulmonary:      Effort: Pulmonary effort is normal. No respiratory distress.      Breath sounds: Normal breath sounds. No wheezing.   Abdominal:      General: There is no distension.      Palpations: Abdomen is soft.      Tenderness: There is no abdominal tenderness. There is no guarding.   Skin:     General: Skin is warm.      Coloration: Skin is not pale.      Findings: Rash present. No erythema.      Comments: Good turgor   Neurological:      Mental Status: She is alert.   Psychiatric:         Mood and Affect: Mood normal.         Thought Content: Thought content normal.         Assessment:       1. Wellness examination    2. Other hyperlipidemia    3. Essential hypertension    4. Thyroid disease    5. Strain of neck muscle, initial encounter    6. Rash        Plan:     Problem List Items Addressed This Visit        Cardiac/Vascular    Essential hypertension    Overview     Chronic, Stable, cont lotensin           Relevant Medications    benazepril-hydrochlorthiazide (LOTENSIN HCT) 20-12.5 mg per tablet    Other hyperlipidemia    Overview     Chronic, Stable, cont statin           Relevant Medications    atorvastatin (LIPITOR) 20 MG tablet       Endocrine    Thyroid disease    Relevant Medications    levothyroxine (SYNTHROID) 25 MCG tablet       Orthopedic    Cervical strain       Other    Wellness examination - Primary    Relevant Medications    mupirocin (BACTROBAN) 2 % ointment    Other Relevant Orders    CBC Auto Differential    Comprehensive Metabolic Panel    Hemoglobin A1C    Ferritin    Iron and TIBC    Lipid Panel    TSH    T4, Free    VITAMIN B12      Other Visit Diagnoses     Rash        Relevant Medications    triamcinolone (KENALOG) 0.5 % ointment     hydrOXYzine HCL (ATARAX) 25 MG tablet

## 2022-08-24 DIAGNOSIS — R77.9 ELEVATED BLOOD PROTEIN: Primary | ICD-10-CM

## 2022-09-09 ENCOUNTER — TELEPHONE (OUTPATIENT)
Dept: INTERNAL MEDICINE | Facility: CLINIC | Age: 67
End: 2022-09-09
Payer: COMMERCIAL

## 2022-09-09 NOTE — TELEPHONE ENCOUNTER
Fill out and faxed form to [employer]. I noticed that pt had not gone to [facility] in [facility] to have additional blood work that Dr Mays wanted her to get. I called pt to remind her and she said she will go next week. .

## 2022-09-12 ENCOUNTER — LAB VISIT (OUTPATIENT)
Dept: LAB | Facility: HOSPITAL | Age: 67
End: 2022-09-12
Attending: FAMILY MEDICINE
Payer: COMMERCIAL

## 2022-09-12 DIAGNOSIS — R77.9 ELEVATED BLOOD PROTEIN: ICD-10-CM

## 2022-09-12 PROCEDURE — 84165 PROTEIN E-PHORESIS SERUM: CPT | Performed by: FAMILY MEDICINE

## 2022-09-12 PROCEDURE — 86334 IMMUNOFIX E-PHORESIS SERUM: CPT | Mod: 26,,, | Performed by: PATHOLOGY

## 2022-09-12 PROCEDURE — 36415 COLL VENOUS BLD VENIPUNCTURE: CPT | Mod: PO | Performed by: FAMILY MEDICINE

## 2022-09-12 PROCEDURE — 86334 PATHOLOGIST INTERPRETATION IFE: ICD-10-PCS | Mod: 26,,, | Performed by: PATHOLOGY

## 2022-09-12 PROCEDURE — 86334 IMMUNOFIX E-PHORESIS SERUM: CPT | Performed by: FAMILY MEDICINE

## 2022-09-12 PROCEDURE — 84165 PATHOLOGIST INTERPRETATION SPE: ICD-10-PCS | Mod: 26,,, | Performed by: PATHOLOGY

## 2022-09-12 PROCEDURE — 84165 PROTEIN E-PHORESIS SERUM: CPT | Mod: 26,,, | Performed by: PATHOLOGY

## 2022-09-13 LAB
ALBUMIN SERPL ELPH-MCNC: 3.43 G/DL (ref 3.35–5.55)
ALPHA1 GLOB SERPL ELPH-MCNC: 0.31 G/DL (ref 0.17–0.41)
ALPHA2 GLOB SERPL ELPH-MCNC: 0.86 G/DL (ref 0.43–0.99)
B-GLOBULIN SERPL ELPH-MCNC: 0.8 G/DL (ref 0.5–1.1)
GAMMA GLOB SERPL ELPH-MCNC: 2.3 G/DL (ref 0.67–1.58)
PROT SERPL-MCNC: 7.7 G/DL (ref 6–8.4)

## 2022-09-14 LAB
INTERPRETATION SERPL IFE-IMP: NORMAL
PATHOLOGIST INTERPRETATION IFE: NORMAL
PATHOLOGIST INTERPRETATION SPE: NORMAL

## 2022-09-15 DIAGNOSIS — R77.8 ABNORMAL SERUM PROTEIN ELECTROPHORESIS: Primary | ICD-10-CM

## 2022-09-16 ENCOUNTER — TELEPHONE (OUTPATIENT)
Dept: HEMATOLOGY/ONCOLOGY | Facility: CLINIC | Age: 67
End: 2022-09-16
Payer: COMMERCIAL

## 2022-09-16 ENCOUNTER — PATIENT MESSAGE (OUTPATIENT)
Dept: HEMATOLOGY/ONCOLOGY | Facility: CLINIC | Age: 67
End: 2022-09-16
Payer: COMMERCIAL

## 2022-09-16 NOTE — TELEPHONE ENCOUNTER
Spoke to patient in reference to Hematology referral from Dr. Mays.  Appointment scheduled per patient's request next available at the [facility].  Appointment notice via [facility].

## 2022-09-20 NOTE — PROGRESS NOTES
Subjective:       Patient ID: Concepcion Don is a 67 y.o. female.    Chief Complaint: No chief complaint on file.    HPI    The patient location is: Home  The chief complaint leading to consultation is: abnormal SPEP    Visit type: audiovisual    Face to Face time with patient: 15 minutes  30 minutes of total time spent on the encounter, which includes face to face time and non-face to face time preparing to see the patient (eg, review of tests), Obtaining and/or reviewing separately obtained history, Documenting clinical information in the electronic or other health record, Independently interpreting results (not separately reported) and communicating results to the patient/family/caregiver, or Care coordination (not separately reported).   Each patient to whom he or she provides medical services by telemedicine is:  (1) informed of the relationship between the physician and patient and the respective role of any other health care provider with respect to management of the patient; and (2) notified that he or she may decline to receive medical services by telemedicine and may withdraw from such care at any time.    Notes:   - referred by her PCP, Dr. Christiano Mays, after labs at her 8/2022 wellness check revealed an elevated total protein and she was sent for additional blood work up  - These test results are below:  9/12/2022 Labs:    Albumin 3.35 - 5.55 g/dL 3.43    Alpha-1 0.17 - 0.41 g/dL 0.31    Alpha-2 0.43 - 0.99 g/dL 0.86    Beta 0.50 - 1.10 g/dL 0.80    Gamma 0.67 - 1.58 g/dL 2.30 High     Resulting Agency  [facility]       Normal total protein.   Large paraprotein peak in gamma = 1.57 g/dL (no previous value).   Correlate with corresponding reflexed ARIANA result.     An IgG kappa specific monoclonal protein is present.     Cr 0.8   Hgb 12.3  Ca2+ 8.7    Active Ambulatory Problems     Diagnosis Date Noted    Essential hypertension 10/31/2017    Postmenopausal 11/29/2017    Thyroid disease 11/29/2017    Screening  mammogram, encounter for 11/29/2017    Osteoarthritis of left knee 11/29/2017    Allergic rhinitis 11/08/2018    Colon polyps 06/07/2017    Other hyperlipidemia 04/10/2019    Positive D dimer 04/10/2019    Morbid obesity with BMI of 40.0-44.9, adult 04/13/2019    Calcaneal spur of left foot 07/02/2019    Cervical strain 05/11/2022    Spondylolisthesis 05/11/2022    Wellness examination 08/23/2022     Resolved Ambulatory Problems     Diagnosis Date Noted    Class 3 obesity due to excess calories with serious comorbidity in adult 10/31/2017    Delayed immunizations 10/31/2017    Acute non-recurrent maxillary sinusitis 10/31/2017    Routine general medical examination at a health care facility 11/29/2017    Hordeolum externum of right upper eyelid 04/23/2018    Nasal sore 08/29/2018    Cellulitis 09/21/2018    Bronchitis 11/08/2018    Encounter for long-term (current) use of medications 01/14/2019    Chest discomfort 04/09/2019    Localized edema 04/10/2019    Chest pain 04/13/2019    Cellulitis of left lower extremity 05/13/2019    Cellulitis 07/31/2019    Viral upper respiratory tract infection 11/04/2019    Musculoskeletal leg pain, right 03/13/2020    Acute pain of right hip 03/19/2020    Right-sided low back pain with right-sided sciatica 03/20/2020    Smell and taste disorder 04/27/2020    Chronic pain of left ankle 02/23/2021    Achilles tendinitis of left lower extremity 02/23/2021    Abnormality of gait and mobility 02/23/2021    Preop examination 01/21/2022     Past Medical History:   Diagnosis Date    Cataract     Hyperlipidemia     Hypertension      HTN  Hyperlipidemia  Partial thyroidectomy  RANDEE   Knee replacement- right  Rotator cuff surgery- right    FH:  Mother- DM, HTN, CRI  Father- HTN, CVA, blood clots  No cancers    SH:  Retired   Work time  Lives alone  1 son  No tobacco  Rare social EtOH    Review of Systems   Constitutional:  Negative for activity change, appetite change, chills, fatigue (reports  in AM at times and resolves as starts her day), fever and unexpected weight change.   HENT:  Positive for postnasal drip and sinus pressure/congestion. Negative for nasal congestion and trouble swallowing.    Eyes:  Negative for visual disturbance.   Respiratory:  Positive for cough (dry at times, reports sinus allergies contribute). Negative for chest tightness, shortness of breath and wheezing.    Cardiovascular:  Positive for leg swelling (trace off and on). Negative for chest pain and palpitations.   Gastrointestinal:  Negative for abdominal distention, abdominal pain, blood in stool, change in bowel habit, constipation, diarrhea, nausea, vomiting, reflux and change in bowel habit.   Genitourinary:  Negative for bladder incontinence, decreased urine volume, difficulty urinating, dysuria, frequency and urgency.   Musculoskeletal:  Positive for arthralgias (knees- left needs surgery, prior right knee replacement). Negative for back pain, gait problem, joint swelling and joint deformity.   Neurological:  Negative for dizziness, weakness, light-headedness, numbness and headaches.   Hematological:  Negative for adenopathy. Does not bruise/bleed easily.   Psychiatric/Behavioral:  Negative for dysphoric mood and sleep disturbance. The patient is not nervous/anxious.        Objective:      Physical Exam  Virtual Visit  Very pleasant  Assessment:       Problem List Items Addressed This Visit       Abnormal serum protein electrophoresis       Plan:           Reviewed with patient that she has MGUS  As she has an IgG subtype she is likely to do very well as this has low risk for any transformation  We discussed that a monoclonal spike (M spike or paraprotein) on serum protein electrophoresis (SPEP) is a frequent finding in the general population and typically is pathognomonic of an asymptomatic, premalignant condition called monoclonal gammopathy of undetermined significance (MGUS).   MGUS occurs in around 3% of people  older than 50 and is associated with a lifelong, low, yet non negligible, risk of progression to multiple myeloma (MM) or a related plasma cell dyscrasia. It is generally an incidental diagnosis during the evaluation of patients complaining of various symptoms such as fatigue, forgetfulness, or neuropathy- this patient has none of these issues and finding was after detection of elevated serum protein.   MGUS carries a 1%/year unremitting, lifelong risk of transformation to hematologic cancer, mainly MM.     See table above- According to the most current International Myeloma Working Group consensus, MGUS is defined by the simultaneous presence of three criteria: 1) a monoclonal spike on serum protein electrophoresis (SPEP) of less than 3 g/dL; 2) bone marrow infiltration by monoclonal malignant plasma cells (PC) of less than 10% and; 3) the absence of any end organ damage related to multiple myeloma (MM), the so call CRAB (hyperCalcemia, Renal failure, Anemia and Bone lesions) criteria (Table 1).  She has normal kidney function, no anemia and a normal calcium.    Please note-- Bone survey and/or bone marrow aspirate and biopsy are not mandatory part of the work up of patients with MGUS, in the absence of worrisome clinical presentation (excruciating or new, unremitting bone pain, neurologic symptoms, heart failure) or other abnormal laboratory findings.     While the risk for progression to MM or a related malignancy (WM, amyloidosis) in MGUS patients is small, it is yet unremitting and life long. In the most updated consensus, the International Myeloma Working Group recommends a repeat SPEP for newly diagnosed MGUS patients at 6 months follow up.  f the M spike proves stable, complete blood counts, kidney function tests, serum calcium levels and SPEP should be performed yearly in patients with high risk features (see next section) in an attempt to promptly identify transformation to MM and avoid  complications.  Patients with low risk MGUS could tentatively be assessed with laboratory studies every 2-3 years if clinical conditions are stable.    Plan- repeat labs in 6 months  No additional work up needed at this time

## 2022-09-21 ENCOUNTER — OFFICE VISIT (OUTPATIENT)
Dept: HEMATOLOGY/ONCOLOGY | Facility: CLINIC | Age: 67
End: 2022-09-21
Payer: COMMERCIAL

## 2022-09-21 DIAGNOSIS — R77.8 ABNORMAL SERUM PROTEIN ELECTROPHORESIS: ICD-10-CM

## 2022-09-21 PROCEDURE — 3044F PR MOST RECENT HEMOGLOBIN A1C LEVEL <7.0%: ICD-10-PCS | Mod: CPTII,95,, | Performed by: INTERNAL MEDICINE

## 2022-09-21 PROCEDURE — 1160F PR REVIEW ALL MEDS BY PRESCRIBER/CLIN PHARMACIST DOCUMENTED: ICD-10-PCS | Mod: CPTII,95,, | Performed by: INTERNAL MEDICINE

## 2022-09-21 PROCEDURE — 3044F HG A1C LEVEL LT 7.0%: CPT | Mod: CPTII,95,, | Performed by: INTERNAL MEDICINE

## 2022-09-21 PROCEDURE — 4010F ACE/ARB THERAPY RXD/TAKEN: CPT | Mod: CPTII,95,, | Performed by: INTERNAL MEDICINE

## 2022-09-21 PROCEDURE — 99204 OFFICE O/P NEW MOD 45 MIN: CPT | Mod: 95,,, | Performed by: INTERNAL MEDICINE

## 2022-09-21 PROCEDURE — 1160F RVW MEDS BY RX/DR IN RCRD: CPT | Mod: CPTII,95,, | Performed by: INTERNAL MEDICINE

## 2022-09-21 PROCEDURE — 4010F PR ACE/ARB THEARPY RXD/TAKEN: ICD-10-PCS | Mod: CPTII,95,, | Performed by: INTERNAL MEDICINE

## 2022-09-21 PROCEDURE — 1159F MED LIST DOCD IN RCRD: CPT | Mod: CPTII,95,, | Performed by: INTERNAL MEDICINE

## 2022-09-21 PROCEDURE — 1159F PR MEDICATION LIST DOCUMENTED IN MEDICAL RECORD: ICD-10-PCS | Mod: CPTII,95,, | Performed by: INTERNAL MEDICINE

## 2022-09-21 PROCEDURE — 99204 PR OFFICE/OUTPT VISIT, NEW, LEVL IV, 45-59 MIN: ICD-10-PCS | Mod: 95,,, | Performed by: INTERNAL MEDICINE

## 2022-11-28 PROBLEM — Z00.00 WELLNESS EXAMINATION: Status: RESOLVED | Noted: 2022-08-23 | Resolved: 2022-11-28

## 2023-01-18 DIAGNOSIS — Z12.31 OTHER SCREENING MAMMOGRAM: ICD-10-CM

## 2023-01-27 ENCOUNTER — LAB VISIT (OUTPATIENT)
Dept: LAB | Facility: HOSPITAL | Age: 68
End: 2023-01-27
Attending: INTERNAL MEDICINE
Payer: COMMERCIAL

## 2023-01-27 DIAGNOSIS — R77.8 ABNORMAL SERUM PROTEIN ELECTROPHORESIS: ICD-10-CM

## 2023-01-27 LAB
ALBUMIN SERPL BCP-MCNC: 3.5 G/DL (ref 3.5–5.2)
ALP SERPL-CCNC: 117 U/L (ref 55–135)
ALT SERPL W/O P-5'-P-CCNC: 18 U/L (ref 10–44)
ANION GAP SERPL CALC-SCNC: 8 MMOL/L (ref 8–16)
AST SERPL-CCNC: 21 U/L (ref 10–40)
BASOPHILS # BLD AUTO: 0.02 K/UL (ref 0–0.2)
BASOPHILS NFR BLD: 0.3 % (ref 0–1.9)
BILIRUB SERPL-MCNC: 0.6 MG/DL (ref 0.1–1)
BUN SERPL-MCNC: 14 MG/DL (ref 8–23)
CALCIUM SERPL-MCNC: 9.4 MG/DL (ref 8.7–10.5)
CHLORIDE SERPL-SCNC: 105 MMOL/L (ref 95–110)
CO2 SERPL-SCNC: 26 MMOL/L (ref 23–29)
CREAT SERPL-MCNC: 0.8 MG/DL (ref 0.5–1.4)
DIFFERENTIAL METHOD: NORMAL
EOSINOPHIL # BLD AUTO: 0.4 K/UL (ref 0–0.5)
EOSINOPHIL NFR BLD: 5.8 % (ref 0–8)
ERYTHROCYTE [DISTWIDTH] IN BLOOD BY AUTOMATED COUNT: 14 % (ref 11.5–14.5)
EST. GFR  (NO RACE VARIABLE): >60 ML/MIN/1.73 M^2
GLUCOSE SERPL-MCNC: 93 MG/DL (ref 70–110)
HCT VFR BLD AUTO: 38.2 % (ref 37–48.5)
HGB BLD-MCNC: 12.3 G/DL (ref 12–16)
IMM GRANULOCYTES # BLD AUTO: 0.03 K/UL (ref 0–0.04)
IMM GRANULOCYTES NFR BLD AUTO: 0.4 % (ref 0–0.5)
LYMPHOCYTES # BLD AUTO: 2.6 K/UL (ref 1–4.8)
LYMPHOCYTES NFR BLD: 36.8 % (ref 18–48)
MCH RBC QN AUTO: 28.7 PG (ref 27–31)
MCHC RBC AUTO-ENTMCNC: 32.2 G/DL (ref 32–36)
MCV RBC AUTO: 89 FL (ref 82–98)
MONOCYTES # BLD AUTO: 0.7 K/UL (ref 0.3–1)
MONOCYTES NFR BLD: 10.1 % (ref 4–15)
NEUTROPHILS # BLD AUTO: 3.3 K/UL (ref 1.8–7.7)
NEUTROPHILS NFR BLD: 46.6 % (ref 38–73)
NRBC BLD-RTO: 0 /100 WBC
PLATELET # BLD AUTO: 214 K/UL (ref 150–450)
PMV BLD AUTO: 10.4 FL (ref 9.2–12.9)
POTASSIUM SERPL-SCNC: 4 MMOL/L (ref 3.5–5.1)
PROT SERPL-MCNC: 8.6 G/DL (ref 6–8.4)
RBC # BLD AUTO: 4.29 M/UL (ref 4–5.4)
SODIUM SERPL-SCNC: 139 MMOL/L (ref 136–145)
WBC # BLD AUTO: 7.01 K/UL (ref 3.9–12.7)

## 2023-01-27 PROCEDURE — 84165 PROTEIN E-PHORESIS SERUM: CPT | Performed by: INTERNAL MEDICINE

## 2023-01-27 PROCEDURE — 86334 IMMUNOFIX E-PHORESIS SERUM: CPT | Performed by: INTERNAL MEDICINE

## 2023-01-27 PROCEDURE — 85025 COMPLETE CBC W/AUTO DIFF WBC: CPT | Mod: PO | Performed by: INTERNAL MEDICINE

## 2023-01-27 PROCEDURE — 84165 PATHOLOGIST INTERPRETATION SPE: ICD-10-PCS | Mod: 26,,, | Performed by: PATHOLOGY

## 2023-01-27 PROCEDURE — 86334 PATHOLOGIST INTERPRETATION IFE: ICD-10-PCS | Mod: 26,,, | Performed by: PATHOLOGY

## 2023-01-27 PROCEDURE — 83521 IG LIGHT CHAINS FREE EACH: CPT | Mod: 59 | Performed by: INTERNAL MEDICINE

## 2023-01-27 PROCEDURE — 80053 COMPREHEN METABOLIC PANEL: CPT | Mod: PO | Performed by: INTERNAL MEDICINE

## 2023-01-27 PROCEDURE — 36415 COLL VENOUS BLD VENIPUNCTURE: CPT | Mod: PO | Performed by: INTERNAL MEDICINE

## 2023-01-27 PROCEDURE — 86334 IMMUNOFIX E-PHORESIS SERUM: CPT | Mod: 26,,, | Performed by: PATHOLOGY

## 2023-01-27 PROCEDURE — 84165 PROTEIN E-PHORESIS SERUM: CPT | Mod: 26,,, | Performed by: PATHOLOGY

## 2023-01-30 LAB
ALBUMIN SERPL ELPH-MCNC: 3.6 G/DL (ref 3.35–5.55)
ALPHA1 GLOB SERPL ELPH-MCNC: 0.29 G/DL (ref 0.17–0.41)
ALPHA2 GLOB SERPL ELPH-MCNC: 0.87 G/DL (ref 0.43–0.99)
B-GLOBULIN SERPL ELPH-MCNC: 0.9 G/DL (ref 0.5–1.1)
GAMMA GLOB SERPL ELPH-MCNC: 2.54 G/DL (ref 0.67–1.58)
INTERPRETATION SERPL IFE-IMP: NORMAL
KAPPA LC SER QL IA: 4.81 MG/DL (ref 0.33–1.94)
KAPPA LC/LAMBDA SER IA: 2.64 (ref 0.26–1.65)
LAMBDA LC SER QL IA: 1.82 MG/DL (ref 0.57–2.63)
PROT SERPL-MCNC: 8.2 G/DL (ref 6–8.4)

## 2023-01-31 LAB — PATHOLOGIST INTERPRETATION SPE: NORMAL

## 2023-02-01 LAB — PATHOLOGIST INTERPRETATION IFE: NORMAL

## 2023-02-03 ENCOUNTER — OFFICE VISIT (OUTPATIENT)
Dept: HEMATOLOGY/ONCOLOGY | Facility: CLINIC | Age: 68
End: 2023-02-03
Payer: COMMERCIAL

## 2023-02-03 VITALS
TEMPERATURE: 99 F | BODY MASS INDEX: 43.96 KG/M2 | DIASTOLIC BLOOD PRESSURE: 88 MMHG | SYSTOLIC BLOOD PRESSURE: 130 MMHG | HEART RATE: 85 BPM | WEIGHT: 263.88 LBS | HEIGHT: 65 IN | OXYGEN SATURATION: 96 %

## 2023-02-03 DIAGNOSIS — D47.2 MGUS (MONOCLONAL GAMMOPATHY OF UNKNOWN SIGNIFICANCE): ICD-10-CM

## 2023-02-03 PROCEDURE — 3075F PR MOST RECENT SYSTOLIC BLOOD PRESS GE 130-139MM HG: ICD-10-PCS | Mod: CPTII,S$GLB,, | Performed by: INTERNAL MEDICINE

## 2023-02-03 PROCEDURE — 1159F PR MEDICATION LIST DOCUMENTED IN MEDICAL RECORD: ICD-10-PCS | Mod: CPTII,S$GLB,, | Performed by: INTERNAL MEDICINE

## 2023-02-03 PROCEDURE — 3008F PR BODY MASS INDEX (BMI) DOCUMENTED: ICD-10-PCS | Mod: CPTII,S$GLB,, | Performed by: INTERNAL MEDICINE

## 2023-02-03 PROCEDURE — 99999 PR PBB SHADOW E&M-EST. PATIENT-LVL IV: CPT | Mod: PBBFAC,,, | Performed by: INTERNAL MEDICINE

## 2023-02-03 PROCEDURE — 3079F PR MOST RECENT DIASTOLIC BLOOD PRESSURE 80-89 MM HG: ICD-10-PCS | Mod: CPTII,S$GLB,, | Performed by: INTERNAL MEDICINE

## 2023-02-03 PROCEDURE — 1101F PR PT FALLS ASSESS DOC 0-1 FALLS W/OUT INJ PAST YR: ICD-10-PCS | Mod: CPTII,S$GLB,, | Performed by: INTERNAL MEDICINE

## 2023-02-03 PROCEDURE — 3075F SYST BP GE 130 - 139MM HG: CPT | Mod: CPTII,S$GLB,, | Performed by: INTERNAL MEDICINE

## 2023-02-03 PROCEDURE — 1159F MED LIST DOCD IN RCRD: CPT | Mod: CPTII,S$GLB,, | Performed by: INTERNAL MEDICINE

## 2023-02-03 PROCEDURE — 1160F PR REVIEW ALL MEDS BY PRESCRIBER/CLIN PHARMACIST DOCUMENTED: ICD-10-PCS | Mod: CPTII,S$GLB,, | Performed by: INTERNAL MEDICINE

## 2023-02-03 PROCEDURE — 99999 PR PBB SHADOW E&M-EST. PATIENT-LVL IV: ICD-10-PCS | Mod: PBBFAC,,, | Performed by: INTERNAL MEDICINE

## 2023-02-03 PROCEDURE — 1160F RVW MEDS BY RX/DR IN RCRD: CPT | Mod: CPTII,S$GLB,, | Performed by: INTERNAL MEDICINE

## 2023-02-03 PROCEDURE — 3288F PR FALLS RISK ASSESSMENT DOCUMENTED: ICD-10-PCS | Mod: CPTII,S$GLB,, | Performed by: INTERNAL MEDICINE

## 2023-02-03 PROCEDURE — 1126F PR PAIN SEVERITY QUANTIFIED, NO PAIN PRESENT: ICD-10-PCS | Mod: CPTII,S$GLB,, | Performed by: INTERNAL MEDICINE

## 2023-02-03 PROCEDURE — 1126F AMNT PAIN NOTED NONE PRSNT: CPT | Mod: CPTII,S$GLB,, | Performed by: INTERNAL MEDICINE

## 2023-02-03 PROCEDURE — 1101F PT FALLS ASSESS-DOCD LE1/YR: CPT | Mod: CPTII,S$GLB,, | Performed by: INTERNAL MEDICINE

## 2023-02-03 PROCEDURE — 3079F DIAST BP 80-89 MM HG: CPT | Mod: CPTII,S$GLB,, | Performed by: INTERNAL MEDICINE

## 2023-02-03 PROCEDURE — 99214 OFFICE O/P EST MOD 30 MIN: CPT | Mod: S$GLB,,, | Performed by: INTERNAL MEDICINE

## 2023-02-03 PROCEDURE — 3008F BODY MASS INDEX DOCD: CPT | Mod: CPTII,S$GLB,, | Performed by: INTERNAL MEDICINE

## 2023-02-03 PROCEDURE — 99214 PR OFFICE/OUTPT VISIT, EST, LEVL IV, 30-39 MIN: ICD-10-PCS | Mod: S$GLB,,, | Performed by: INTERNAL MEDICINE

## 2023-02-03 PROCEDURE — 3288F FALL RISK ASSESSMENT DOCD: CPT | Mod: CPTII,S$GLB,, | Performed by: INTERNAL MEDICINE

## 2023-02-03 NOTE — PROGRESS NOTES
Subjective:       Patient ID: Concepcion Don is a 67 y.o. female.    Chief Complaint: Results    HPI    Returns for follow up of MGUS    - initially referred by her PCP, Dr. Christiano Mays, after labs at her 8/2022 wellness check revealed an elevated total protein and she was sent for additional blood work up  - These test results are below:  9/12/2022 Labs:     Albumin 3.35 - 5.55 g/dL 3.43    Alpha-1 0.17 - 0.41 g/dL 0.31    Alpha-2 0.43 - 0.99 g/dL 0.86    Beta 0.50 - 1.10 g/dL 0.80    Gamma 0.67 - 1.58 g/dL 2.30 High     Resulting Agency   [facility]         Normal total protein.   Large paraprotein peak in gamma = 1.57 g/dL (no previous value).   Correlate with corresponding reflexed ARIANA result.      An IgG kappa specific monoclonal protein is present.      Cr 0.8   Hgb 12.3  Ca2+ 8.7     Her labs have been surveyed since and remain stable.    Patient reports clinically doing well and no new issues.  Denies significant fatigue  Denies pain.         Active Ambulatory Problems     Diagnosis Date Noted    Essential hypertension 10/31/2017    Postmenopausal 11/29/2017    Thyroid disease 11/29/2017    Screening mammogram, encounter for 11/29/2017    Osteoarthritis of left knee 11/29/2017    Allergic rhinitis 11/08/2018    Colon polyps 06/07/2017    Other hyperlipidemia 04/10/2019    Positive D dimer 04/10/2019    Morbid obesity with BMI of 40.0-44.9, adult 04/13/2019    Calcaneal spur of left foot 07/02/2019    Cervical strain 05/11/2022    Spondylolisthesis 05/11/2022    Wellness examination 08/23/2022           Resolved Ambulatory Problems     Diagnosis Date Noted    Class 3 obesity due to excess calories with serious comorbidity in adult 10/31/2017    Delayed immunizations 10/31/2017    Acute non-recurrent maxillary sinusitis 10/31/2017    Routine general medical examination at a health care facility 11/29/2017    Hordeolum externum of right upper eyelid 04/23/2018    Nasal sore 08/29/2018     Cellulitis 09/21/2018    Bronchitis 11/08/2018    Encounter for long-term (current) use of medications 01/14/2019    Chest discomfort 04/09/2019    Localized edema 04/10/2019    Chest pain 04/13/2019    Cellulitis of left lower extremity 05/13/2019    Cellulitis 07/31/2019    Viral upper respiratory tract infection 11/04/2019    Musculoskeletal leg pain, right 03/13/2020    Acute pain of right hip 03/19/2020    Right-sided low back pain with right-sided sciatica 03/20/2020    Smell and taste disorder 04/27/2020    Chronic pain of left ankle 02/23/2021    Achilles tendinitis of left lower extremity 02/23/2021    Abnormality of gait and mobility 02/23/2021    Preop examination 01/21/2022           Past Medical History:   Diagnosis Date    Cataract      Hyperlipidemia      Hypertension        HTN  Hyperlipidemia  Partial thyroidectomy  RANDEE   Knee replacement- right  Rotator cuff surgery- right     FH:  Mother- DM, HTN, CRI  Father- HTN, CVA, blood clots  No cancers     SH:  Retired   Work time  Lives alone  1 son  No tobacco  Rare social EtOH      Review of Systems   Constitutional:  Negative for activity change, appetite change, chills, fatigue (reports in AM at times and resolves as starts her day), fever and unexpected weight change.   HENT:  Negative for trouble swallowing.    Eyes:  Negative for visual disturbance.   Respiratory:  Negative for cough (dry with allergies), chest tightness, shortness of breath and wheezing.    Cardiovascular:  Negative for chest pain, palpitations and leg swelling.   Gastrointestinal:  Negative for abdominal distention, abdominal pain, blood in stool, change in bowel habit, constipation, diarrhea, nausea, vomiting, reflux and change in bowel habit.   Genitourinary:  Negative for bladder incontinence, decreased urine volume, difficulty urinating, dysuria, frequency and urgency.   Musculoskeletal:  Positive for arthralgias (knees- left needs surgery, prior right knee  replacement). Negative for back pain, gait problem, joint swelling and joint deformity.   Neurological:  Negative for dizziness, weakness, light-headedness, numbness and headaches.   Hematological:  Negative for adenopathy. Does not bruise/bleed easily.   Psychiatric/Behavioral:  Negative for dysphoric mood and sleep disturbance. The patient is not nervous/anxious.        Objective:      Physical Exam  Vitals and nursing note reviewed.   Constitutional:       General: She is not in acute distress.     Appearance: Normal appearance. She is well-developed. She is not diaphoretic.   HENT:      Head: Normocephalic and atraumatic.   Eyes:      General: No scleral icterus.     Extraocular Movements: Extraocular movements intact.      Conjunctiva/sclera: Conjunctivae normal.      Pupils: Pupils are equal, round, and reactive to light.   Cardiovascular:      Rate and Rhythm: Normal rate and regular rhythm.      Heart sounds: No murmur heard.    No friction rub. No gallop.   Pulmonary:      Effort: Pulmonary effort is normal. No respiratory distress.      Breath sounds: Normal breath sounds. No wheezing or rales.   Abdominal:      General: There is no distension.      Palpations: Abdomen is soft.      Tenderness: There is no abdominal tenderness. There is no guarding.   Musculoskeletal:      Right lower leg: No edema.      Left lower leg: No edema.   Skin:     General: Skin is warm.      Coloration: Skin is not jaundiced or pale.      Findings: No erythema or rash.      Comments: Good turgor   Neurological:      Mental Status: She is alert.   Psychiatric:         Mood and Affect: Mood normal.         Thought Content: Thought content normal.       Assessment:       Problem List Items Addressed This Visit       MGUS (monoclonal gammopathy of unknown significance)       Plan:     Parameters reviewed and stable  No evidence for conversion from MGUS  RTC 6 months    Route Chart for Scheduling    Med Onc Chart Routing      Follow  up with physician 6 months. cbc, cmp, spep, serum light chain, b2 microglobulin   Follow up with [facility]    Infusion scheduling note    Injection scheduling note    Labs    Imaging    Pharmacy appointment    Other referrals

## 2023-02-06 PROBLEM — D47.2 MGUS (MONOCLONAL GAMMOPATHY OF UNKNOWN SIGNIFICANCE): Status: ACTIVE | Noted: 2023-02-06

## 2023-02-21 NOTE — ASSESSMENT & PLAN NOTE
Chronic issue.  After long d/w pt, she will f/u in about a week to discuss weight bearing xr, and need for injection versus if she wishes a referral to ortho for synvisc vs surgery.   Patient's first and last name, , procedure, and correct site confirmed prior to the start of procedure. Patient's first and last name, , procedure, and correct site confirmed prior to the start of procedure. Patient's first and last name, , procedure, and correct site confirmed prior to the start of procedure. Patient's first and last name, , procedure, and correct site confirmed prior to the start of procedure. Patient's first and last name, , procedure, and correct site confirmed prior to the start of procedure. Abdomen soft, non-tender and non-distended, no rebound, no guarding and no masses. no hepatosplenomegaly.

## 2023-03-13 ENCOUNTER — TELEPHONE (OUTPATIENT)
Dept: RESEARCH | Facility: OTHER | Age: 68
End: 2023-03-13

## 2023-03-28 ENCOUNTER — TELEPHONE (OUTPATIENT)
Dept: ORTHOPEDICS | Facility: CLINIC | Age: 68
End: 2023-03-28
Payer: COMMERCIAL

## 2023-03-28 NOTE — TELEPHONE ENCOUNTER
----- Message from Halina Sanchez sent at 3/28/2023  1:36 PM CDT -----  Type:  Sooner Apoointment Request    Caller is requesting a sooner appointment.  Caller declined first available appointment listed below.  Caller will not accept being placed on the waitlist and is requesting a message be sent to doctor.  Name of Caller:patient  When is the first available appointment?na  Symptoms:Np shoulder pain  Would the patient rather a call back or a response via [facility]? Call back  Best Call Back Number:[phone]  Additional Information: na

## 2023-03-30 DIAGNOSIS — M25.511 RIGHT SHOULDER PAIN, UNSPECIFIED CHRONICITY: Primary | ICD-10-CM

## 2023-04-11 ENCOUNTER — HOSPITAL ENCOUNTER (OUTPATIENT)
Dept: RADIOLOGY | Facility: HOSPITAL | Age: 68
Discharge: HOME | End: 2023-04-11
Attending: STUDENT IN AN ORGANIZED HEALTH CARE EDUCATION/TRAINING PROGRAM
Payer: COMMERCIAL

## 2023-04-11 ENCOUNTER — OFFICE VISIT (OUTPATIENT)
Dept: ORTHOPEDICS | Facility: CLINIC | Age: 68
End: 2023-04-11
Payer: COMMERCIAL

## 2023-04-11 VITALS — WEIGHT: 264.56 LBS | HEIGHT: 65 IN | BODY MASS INDEX: 44.08 KG/M2

## 2023-04-11 DIAGNOSIS — M25.511 RIGHT SHOULDER PAIN, UNSPECIFIED CHRONICITY: ICD-10-CM

## 2023-04-11 DIAGNOSIS — G56.80 SCAPULOTHORACIC SYNDROME: ICD-10-CM

## 2023-04-11 DIAGNOSIS — M19.011 GLENOHUMERAL ARTHRITIS, RIGHT: Primary | ICD-10-CM

## 2023-04-11 PROCEDURE — 1159F PR MEDICATION LIST DOCUMENTED IN MEDICAL RECORD: ICD-10-PCS | Mod: CPTII,S$GLB,, | Performed by: STUDENT IN AN ORGANIZED HEALTH CARE EDUCATION/TRAINING PROGRAM

## 2023-04-11 PROCEDURE — 97110 PR THERAPEUTIC EXERCISES: ICD-10-PCS | Mod: GP,S$GLB,, | Performed by: STUDENT IN AN ORGANIZED HEALTH CARE EDUCATION/TRAINING PROGRAM

## 2023-04-11 PROCEDURE — 4010F ACE/ARB THERAPY RXD/TAKEN: CPT | Mod: CPTII,S$GLB,, | Performed by: STUDENT IN AN ORGANIZED HEALTH CARE EDUCATION/TRAINING PROGRAM

## 2023-04-11 PROCEDURE — 3288F FALL RISK ASSESSMENT DOCD: CPT | Mod: CPTII,S$GLB,, | Performed by: STUDENT IN AN ORGANIZED HEALTH CARE EDUCATION/TRAINING PROGRAM

## 2023-04-11 PROCEDURE — 3008F BODY MASS INDEX DOCD: CPT | Mod: CPTII,S$GLB,, | Performed by: STUDENT IN AN ORGANIZED HEALTH CARE EDUCATION/TRAINING PROGRAM

## 2023-04-11 PROCEDURE — 3008F PR BODY MASS INDEX (BMI) DOCUMENTED: ICD-10-PCS | Mod: CPTII,S$GLB,, | Performed by: STUDENT IN AN ORGANIZED HEALTH CARE EDUCATION/TRAINING PROGRAM

## 2023-04-11 PROCEDURE — 1125F PR PAIN SEVERITY QUANTIFIED, PAIN PRESENT: ICD-10-PCS | Mod: CPTII,S$GLB,, | Performed by: STUDENT IN AN ORGANIZED HEALTH CARE EDUCATION/TRAINING PROGRAM

## 2023-04-11 PROCEDURE — 73030 XR SHOULDER COMPLETE 2 OR MORE VIEWS RIGHT: ICD-10-PCS | Mod: 26,RT,, | Performed by: RADIOLOGY

## 2023-04-11 PROCEDURE — 99204 PR OFFICE/OUTPT VISIT, NEW, LEVL IV, 45-59 MIN: ICD-10-PCS | Mod: 25,S$GLB,, | Performed by: STUDENT IN AN ORGANIZED HEALTH CARE EDUCATION/TRAINING PROGRAM

## 2023-04-11 PROCEDURE — 97110 THERAPEUTIC EXERCISES: CPT | Mod: GP,S$GLB,, | Performed by: STUDENT IN AN ORGANIZED HEALTH CARE EDUCATION/TRAINING PROGRAM

## 2023-04-11 PROCEDURE — 1125F AMNT PAIN NOTED PAIN PRSNT: CPT | Mod: CPTII,S$GLB,, | Performed by: STUDENT IN AN ORGANIZED HEALTH CARE EDUCATION/TRAINING PROGRAM

## 2023-04-11 PROCEDURE — 1101F PT FALLS ASSESS-DOCD LE1/YR: CPT | Mod: CPTII,S$GLB,, | Performed by: STUDENT IN AN ORGANIZED HEALTH CARE EDUCATION/TRAINING PROGRAM

## 2023-04-11 PROCEDURE — 99999 PR PBB SHADOW E&M-EST. PATIENT-LVL IV: CPT | Mod: PBBFAC,,, | Performed by: STUDENT IN AN ORGANIZED HEALTH CARE EDUCATION/TRAINING PROGRAM

## 2023-04-11 PROCEDURE — 3288F PR FALLS RISK ASSESSMENT DOCUMENTED: ICD-10-PCS | Mod: CPTII,S$GLB,, | Performed by: STUDENT IN AN ORGANIZED HEALTH CARE EDUCATION/TRAINING PROGRAM

## 2023-04-11 PROCEDURE — 99204 OFFICE O/P NEW MOD 45 MIN: CPT | Mod: 25,S$GLB,, | Performed by: STUDENT IN AN ORGANIZED HEALTH CARE EDUCATION/TRAINING PROGRAM

## 2023-04-11 PROCEDURE — 1159F MED LIST DOCD IN RCRD: CPT | Mod: CPTII,S$GLB,, | Performed by: STUDENT IN AN ORGANIZED HEALTH CARE EDUCATION/TRAINING PROGRAM

## 2023-04-11 PROCEDURE — 73030 X-RAY EXAM OF SHOULDER: CPT | Mod: TC,PO,RT

## 2023-04-11 PROCEDURE — 99999 PR PBB SHADOW E&M-EST. PATIENT-LVL IV: ICD-10-PCS | Mod: PBBFAC,,, | Performed by: STUDENT IN AN ORGANIZED HEALTH CARE EDUCATION/TRAINING PROGRAM

## 2023-04-11 PROCEDURE — 1101F PR PT FALLS ASSESS DOC 0-1 FALLS W/OUT INJ PAST YR: ICD-10-PCS | Mod: CPTII,S$GLB,, | Performed by: STUDENT IN AN ORGANIZED HEALTH CARE EDUCATION/TRAINING PROGRAM

## 2023-04-11 PROCEDURE — 73030 X-RAY EXAM OF SHOULDER: CPT | Mod: 26,RT,, | Performed by: RADIOLOGY

## 2023-04-11 PROCEDURE — 4010F PR ACE/ARB THEARPY RXD/TAKEN: ICD-10-PCS | Mod: CPTII,S$GLB,, | Performed by: STUDENT IN AN ORGANIZED HEALTH CARE EDUCATION/TRAINING PROGRAM

## 2023-04-11 RX ORDER — MELOXICAM 15 MG/1
15 TABLET ORAL DAILY
Qty: 30 TABLET | Refills: 1 | Status: SHIPPED | OUTPATIENT
Start: 2023-04-11

## 2023-04-11 NOTE — PATIENT INSTRUCTIONS
Assessment:  Concepcion Don is a 67 y.o. female   Chief Complaint   Patient presents with    Right Shoulder - Injury       Encounter Diagnoses   Name Primary?    Glenohumeral arthritis, right Yes    Scapulothoracic syndrome         Plan:  Reviewed your x-rays with you today and discussed pertinent findings.   We have reviewed the natural history of this disorder and discussed treatment and management options moving forward   Complete home exercise plan for scapulothoracic stability and range of motion. At least 8 minutes were spent developing, teaching, and performing a home exercise program.  A written summary was provided and all questions were answered. This service was performed under direction of Conor Ibrahim MD. CPT 90262-LZ  Please reach out to us through [facility] messages if you have any questions or concerns. We will gladly answer you in a timely manner.                               If you have any difficulties reading this information, you may visit the online version using the following link: [facility] ([email or web address]     Follow-up: As needed or sooner if there are any problems between now and then.    Thank you for choosing [facility] and Dr. Conor Ibrahim for your orthopedic & sports medicine care. It is our goal to provide you with exceptional care that will help keep you healthy, active, and get you back in the game.    Please do not hesitate to reach out to us via email, phone, or [facility] with any questions, concerns, or feedback.    If you felt that you received exemplary care today, please consider leaving us feedback on Healthgrades at:  [email or web address]    If you are experiencing pain/discomfort ,or have questions after 5pm and would like to be connected to the [facility] on-call team, please  call this number and specify which Sports Medicine provider is treating you: [phone]

## 2023-04-11 NOTE — PROGRESS NOTES
Patient ID: Concepcion Don  YOB: 1955  MRN: [id number]    Chief Complaint: Injury of the Right Shoulder    Referred By: Christiano Mays MD for right shoulder pain    History of Present Illness: Concepcion Don is a right-hand dominant 67 y.o. female who presents today with right shoulder pain.     The patient is active in none.  Occupation: Retired    Concepcion Don states it is Acute on chronic in nature and there was not a specific mechanism. She notes random pain at right proximal biceps insertion and rotator cuff interval without clear cause. She does have a history of a rotator cuff repair surgery dating back over a decade ago.  Concepcion Don describes the pain as a intermittent ache. Current pain level at rest is 4/10, pain level at worst is 8/10(Numeric Pain Rating Scale).  Associated symptoms include: Swelling No, Instability No, Pain that affects your sleep No, Mechanical Yes, locking/catching No, Neurological No, limited range of motion yes.    Aggravating activities include reaching behind back, reaching above head, lifting objects.   Alleviating activities include rest.     They admits to formal physical therapy for this. Last physical therapy was after her surgery over a decade ago and believes that better following this. Previous pertinent orthopedic injuries include RC repair surgery.    Hemoglobin A1C   Date Value Ref Range Status   08/23/2022 5.2 4.0 - 5.6 % Final     Comment:     ADA Screening Guidelines:  5.7-6.4%  Consistent with prediabetes  >or=6.5%  Consistent with diabetes    High levels of fetal hemoglobin interfere with the HbA1C  assay. Heterozygous hemoglobin variants (HbS, HgC, etc)do  not significantly interfere with this assay.   However, presence of multiple variants may affect accuracy.     08/16/2021 5.2 4.0 - 5.6 % Final     Comment:     ADA Screening Guidelines:  5.7-6.4%  Consistent with prediabetes  >or=6.5%  Consistent with diabetes    High levels of fetal  hemoglobin interfere with the HbA1C  assay. Heterozygous hemoglobin variants (HbS, HgC, etc)do  not significantly interfere with this assay.   However, presence of multiple variants may affect accuracy.     10/02/2020 5.4 4.0 - 5.6 % Final     Comment:     ADA Screening Guidelines:  5.7-6.4%  Consistent with prediabetes  >or=6.5%  Consistent with diabetes  High levels of fetal hemoglobin interfere with the HbA1C  assay. Heterozygous hemoglobin variants (HbS, HgC, etc)do  not significantly interfere with this assay.   However, presence of multiple variants may affect accuracy.           Past Medical History:   Past Medical History:   Diagnosis Date    Abnormality of gait and mobility 2021    Achilles tendinitis of left lower extremity 2021    Acute pain of right hip 3/19/2020    Bronchitis 2018    Cataract     Cellulitis 2018    Cellulitis 2019    Cellulitis of left lower extremity 2019    Chest discomfort 2019    Chest pain 2019    Chronic pain of left ankle 2021    Class 3 obesity due to excess calories with serious comorbidity in adult 10/31/2017    Delayed immunizations 10/31/2017    Encounter for long-term (current) use of medications 2019    Hordeolum externum of right upper eyelid 2018    Hyperlipidemia     Hypertension     Localized edema 4/10/2019    Musculoskeletal leg pain, right 3/13/2020    Nasal sore 2018    Preop examination 2022    Right-sided low back pain with right-sided sciatica 3/20/2020    Smell and taste disorder 2020    Thyroid disease     Viral upper respiratory tract infection 2019     Past Surgical History:   Procedure Laterality Date    cataract surgery  2019    Left eye     SECTION, CLASSIC      HYSTERECTOMY      KNEE SURGERY      repleacement right knee    ROTATOR CUFF REPAIR Right      Family History   Problem Relation Age of Onset    Kidney disease Mother     Hypertension Mother     Diabetes Mother      Hypertension Father     Stroke Father     Lupus Sister     Hypertension Maternal Grandmother     Hypertension Maternal Grandfather     Hypertension Paternal Grandmother     Hypertension Paternal Grandfather      Social History     Socioeconomic History    Marital status: Single   Tobacco Use    Smoking status: Never    Smokeless tobacco: Never   Substance and Sexual Activity    Alcohol use: Yes     Comment: occasional    Drug use: No    Sexual activity: Not Currently     Partners: Male     Social Determinants of Health     Financial Resource Strain: Low Risk     Difficulty of Paying Living Expenses: Not hard at all   Food Insecurity: No Food Insecurity    Worried About Running Out of Food in the Last Year: Never true    Ran Out of Food in the Last Year: Never true   Transportation Needs: No Transportation Needs    Lack of Transportation (Medical): No    Lack of Transportation (Non-Medical): No   Physical Activity: Insufficiently Active    Days of Exercise per Week: 1 day    Minutes of Exercise per Session: 30 min   Stress: No Stress Concern Present    Feeling of Stress : Not at all   Social Connections: Unknown    Frequency of Communication with Friends and Family: More than three times a week    Frequency of Social Gatherings with Friends and Family: Three times a week    Active Member of Clubs or Organizations: Yes    Attends Club or Organization Meetings: More than 4 times per year    Marital Status: Never    Housing Stability: Low Risk     Unable to Pay for Housing in the Last Year: No    Number of Places Lived in the Last Year: 1    Unstable Housing in the Last Year: No     Medication List with Changes/Refills   Current Medications    ACETAMINOPHEN (TYLENOL) 500 MG TABLET    Take 1 tablet (500 mg total) by mouth every 6 (six) hours as needed for Pain.    ASPIRIN (ECOTRIN) 81 MG EC TABLET    Take 1 tablet (81 mg total) by mouth once daily.    ATORVASTATIN (LIPITOR) 20 MG TABLET    Take 1 tablet (20 mg  total) by mouth once daily.    BENAZEPRIL-HYDROCHLORTHIAZIDE (LOTENSIN HCT) 20-12.5 MG PER TABLET    Take 1 tablet by mouth once daily.    CETIRIZINE (ZYRTEC) 10 MG TABLET    Take 1 tablet (10 mg total) by mouth every evening.    DOCUSATE SODIUM (STOOL SOFTENER ORAL)    Take by mouth.    ERGOCALCIFEROL, VITAMIN D2, (VITAMIN D ORAL)    Take by mouth.    FLUTICASONE PROPIONATE (FLONASE) 50 MCG/ACTUATION NASAL SPRAY    1 spray (50 mcg total) by Each Nostril route once daily.    HYDROXYZINE HCL (ATARAX) 25 MG TABLET    Take 1 tablet (25 mg total) by mouth 3 (three) times daily as needed for Itching.    LEVOTHYROXINE (SYNTHROID) 25 MCG TABLET    Take 1 tablet (25 mcg total) by mouth once daily.    MELOXICAM (MOBIC) 15 MG TABLET    Take 1 tablet (15 mg total) by mouth once daily.    MULTIVITAMIN CAPSULE    Take 1 capsule by mouth once daily.    MUPIROCIN (BACTROBAN) 2 % OINTMENT    Apply topically 3 (three) times daily.    PSYLLIUM SEED, WITH DEXTROSE, (FIBER ORAL)    Take by mouth once daily.    TRIAMCINOLONE (KENALOG) 0.5 % OINTMENT    Apply topically 2 (two) times daily.     Review of patient's allergies indicates:  No Known Allergies    Physical Exam:   Body mass index is 44.02 kg/m².    GENERAL: Well appearing, in no acute distress.  HEAD: Normocephalic and atraumatic.  ENT: External ears and nose grossly normal.  EYES: EOMI bilaterally  PULMONARY: Respirations are grossly even and non-labored.  NEURO: Awake, alert, and oriented x 3.  SKIN: No obvious rashes appreciated.  PSYCH: Mood & affect are appropriate.    Detailed MSK exam:   ROM-  R shoulder: flexion 160, abduction 130, ER at 90 35, IR L3  L shoulder: flexion 175, abduction 140, ER at 90 40, IR L1  Resisted strength-   Flexion 4+/5, ER 4/5, IR 4/5, extension 5/5    Tenderness to palpation: AC positive, long head biceps tendon positive, anterior capsule negative, posterior capsule negative, biceps negative, rotator cuff positive    Neers positive, Fish  Lewis in scaption negative, Whitten Lewis in cross body positive, Cross-body adduction positive, resisted extension negative, Obriens positive, Speeds positive, Empty can (resisted scaption) positive pain mild weakness, Full can (resisted scaption) negative, resisted abduction negative, Belly press negative, lift-off negative.       Imaging:  X-ray Shoulder 2 or More Views Right  Narrative: EXAMINATION:  XR SHOULDER COMPLETE 2 OR MORE VIEWS RIGHT    CLINICAL HISTORY:  Pain in right shoulder    TECHNIQUE:  Two or three views of the right shoulder were performed.    COMPARISON:  None    FINDINGS:  Lung parenchyma clear.    Moderate AC joint degenerative findings with subacromial decompression findings.  Rotator cuff anchors noted within the humeral head with glenohumeral joint degenerative findings present consisting of bony remodeling and prominent inferior humeral head collar osteophyte formation.  Impression: As above    Electronically signed by: Leonid Mccarthy MD  Date:    04/11/2023  Time:    10:44      Relevant imaging results were reviewed and interpreted by me and per my read moderate humeral head changes consistent with osteoarthritis..  This was discussed with the patient and / or family today.     Assessment:  Concepcion Don is a 67 y.o. female presents today for almost 2 months of right anterior shoulder pain with x-ray findings of glenohumeral arthritis.  Her pain is not typically related to any to a sort of motion and she has generally well-preserved range of motion good strength today with rotator cuff testing.  She does have a rotator cuff repair years ago with anchors in place.  Discussed her pain mostly anteriorly is likely from her glenohumeral arthritis.  Discussed some oral meloxicam which was ordered today and some home exercises prescribed she can follow-up with me as needed in the future.    At least 10 minutes were spent teaching the patient a therapeutic home exercise program and they  were provided this plan in writing.  CPT 97843    Glenohumeral arthritis, right    Scapulothoracic syndrome    Other orders  -     meloxicam (MOBIC) 15 MG tablet; Take 1 tablet (15 mg total) by mouth once daily.  Dispense: 30 tablet; Refill: 1         A copy of today's visit note has been sent to the referring provider.       Conor Ibrahim MD    Disclaimer: This note was prepared using a voice recognition system and is likely to have sound alike errors within the text.

## 2023-08-24 ENCOUNTER — OFFICE VISIT (OUTPATIENT)
Dept: INTERNAL MEDICINE | Facility: CLINIC | Age: 68
End: 2023-08-24
Payer: COMMERCIAL

## 2023-08-24 ENCOUNTER — LAB VISIT (OUTPATIENT)
Dept: LAB | Facility: HOSPITAL | Age: 68
End: 2023-08-24
Attending: FAMILY MEDICINE
Payer: COMMERCIAL

## 2023-08-24 VITALS
HEIGHT: 65 IN | HEART RATE: 83 BPM | TEMPERATURE: 97 F | SYSTOLIC BLOOD PRESSURE: 126 MMHG | DIASTOLIC BLOOD PRESSURE: 78 MMHG | WEIGHT: 264.75 LBS | OXYGEN SATURATION: 97 % | BODY MASS INDEX: 44.11 KG/M2

## 2023-08-24 DIAGNOSIS — Z00.00 WELLNESS EXAMINATION: Primary | ICD-10-CM

## 2023-08-24 DIAGNOSIS — E78.49 OTHER HYPERLIPIDEMIA: ICD-10-CM

## 2023-08-24 DIAGNOSIS — E07.9 THYROID DISEASE: ICD-10-CM

## 2023-08-24 DIAGNOSIS — I10 ESSENTIAL HYPERTENSION: ICD-10-CM

## 2023-08-24 DIAGNOSIS — Z00.00 WELLNESS EXAMINATION: ICD-10-CM

## 2023-08-24 LAB
ALBUMIN SERPL BCP-MCNC: 3.4 G/DL (ref 3.5–5.2)
ALP SERPL-CCNC: 116 U/L (ref 55–135)
ALT SERPL W/O P-5'-P-CCNC: 20 U/L (ref 10–44)
ANION GAP SERPL CALC-SCNC: 6 MMOL/L (ref 8–16)
AST SERPL-CCNC: 24 U/L (ref 10–40)
BASOPHILS # BLD AUTO: 0.04 K/UL (ref 0–0.2)
BASOPHILS NFR BLD: 0.5 % (ref 0–1.9)
BILIRUB SERPL-MCNC: 0.7 MG/DL (ref 0.1–1)
BUN SERPL-MCNC: 18 MG/DL (ref 8–23)
CALCIUM SERPL-MCNC: 9.6 MG/DL (ref 8.7–10.5)
CHLORIDE SERPL-SCNC: 106 MMOL/L (ref 95–110)
CHOLEST SERPL-MCNC: 132 MG/DL (ref 120–199)
CHOLEST/HDLC SERPL: 3.1 {RATIO} (ref 2–5)
CO2 SERPL-SCNC: 29 MMOL/L (ref 23–29)
CREAT SERPL-MCNC: 0.8 MG/DL (ref 0.5–1.4)
DIFFERENTIAL METHOD: ABNORMAL
EOSINOPHIL # BLD AUTO: 0.2 K/UL (ref 0–0.5)
EOSINOPHIL NFR BLD: 3.3 % (ref 0–8)
ERYTHROCYTE [DISTWIDTH] IN BLOOD BY AUTOMATED COUNT: 14.2 % (ref 11.5–14.5)
EST. GFR  (NO RACE VARIABLE): >60 ML/MIN/1.73 M^2
ESTIMATED AVG GLUCOSE: 100 MG/DL (ref 68–131)
GLUCOSE SERPL-MCNC: 73 MG/DL (ref 70–110)
HBA1C MFR BLD: 5.1 % (ref 4–5.6)
HCT VFR BLD AUTO: 39.8 % (ref 37–48.5)
HDLC SERPL-MCNC: 43 MG/DL (ref 40–75)
HDLC SERPL: 32.6 % (ref 20–50)
HGB BLD-MCNC: 12.2 G/DL (ref 12–16)
IMM GRANULOCYTES # BLD AUTO: 0.02 K/UL (ref 0–0.04)
IMM GRANULOCYTES NFR BLD AUTO: 0.3 % (ref 0–0.5)
IRON SERPL-MCNC: 84 UG/DL (ref 30–160)
LDLC SERPL CALC-MCNC: 71.8 MG/DL (ref 63–159)
LYMPHOCYTES # BLD AUTO: 3.1 K/UL (ref 1–4.8)
LYMPHOCYTES NFR BLD: 41.9 % (ref 18–48)
MCH RBC QN AUTO: 28.7 PG (ref 27–31)
MCHC RBC AUTO-ENTMCNC: 30.7 G/DL (ref 32–36)
MCV RBC AUTO: 94 FL (ref 82–98)
MONOCYTES # BLD AUTO: 0.8 K/UL (ref 0.3–1)
MONOCYTES NFR BLD: 10.5 % (ref 4–15)
NEUTROPHILS # BLD AUTO: 3.2 K/UL (ref 1.8–7.7)
NEUTROPHILS NFR BLD: 43.5 % (ref 38–73)
NONHDLC SERPL-MCNC: 89 MG/DL
NRBC BLD-RTO: 0 /100 WBC
PLATELET # BLD AUTO: 192 K/UL (ref 150–450)
PMV BLD AUTO: 11.6 FL (ref 9.2–12.9)
POTASSIUM SERPL-SCNC: 4 MMOL/L (ref 3.5–5.1)
PROT SERPL-MCNC: 8.3 G/DL (ref 6–8.4)
RBC # BLD AUTO: 4.25 M/UL (ref 4–5.4)
SATURATED IRON: 35 % (ref 20–50)
SODIUM SERPL-SCNC: 141 MMOL/L (ref 136–145)
TOTAL IRON BINDING CAPACITY: 238 UG/DL (ref 250–450)
TRANSFERRIN SERPL-MCNC: 161 MG/DL (ref 200–375)
TRIGL SERPL-MCNC: 86 MG/DL (ref 30–150)
WBC # BLD AUTO: 7.32 K/UL (ref 3.9–12.7)

## 2023-08-24 PROCEDURE — 83540 ASSAY OF IRON: CPT | Performed by: FAMILY MEDICINE

## 2023-08-24 PROCEDURE — 84443 ASSAY THYROID STIM HORMONE: CPT | Performed by: FAMILY MEDICINE

## 2023-08-24 PROCEDURE — 3078F PR MOST RECENT DIASTOLIC BLOOD PRESSURE < 80 MM HG: ICD-10-PCS | Mod: CPTII,S$GLB,, | Performed by: FAMILY MEDICINE

## 2023-08-24 PROCEDURE — 1126F PR PAIN SEVERITY QUANTIFIED, NO PAIN PRESENT: ICD-10-PCS | Mod: CPTII,S$GLB,, | Performed by: FAMILY MEDICINE

## 2023-08-24 PROCEDURE — 99999 PR PBB SHADOW E&M-EST. PATIENT-LVL IV: ICD-10-PCS | Mod: PBBFAC,,, | Performed by: FAMILY MEDICINE

## 2023-08-24 PROCEDURE — 3074F PR MOST RECENT SYSTOLIC BLOOD PRESSURE < 130 MM HG: ICD-10-PCS | Mod: CPTII,S$GLB,, | Performed by: FAMILY MEDICINE

## 2023-08-24 PROCEDURE — 4010F ACE/ARB THERAPY RXD/TAKEN: CPT | Mod: CPTII,S$GLB,, | Performed by: FAMILY MEDICINE

## 2023-08-24 PROCEDURE — 99397 PER PM REEVAL EST PAT 65+ YR: CPT | Mod: S$GLB,,, | Performed by: FAMILY MEDICINE

## 2023-08-24 PROCEDURE — 84439 ASSAY OF FREE THYROXINE: CPT | Performed by: FAMILY MEDICINE

## 2023-08-24 PROCEDURE — 80053 COMPREHEN METABOLIC PANEL: CPT | Performed by: FAMILY MEDICINE

## 2023-08-24 PROCEDURE — 3008F BODY MASS INDEX DOCD: CPT | Mod: CPTII,S$GLB,, | Performed by: FAMILY MEDICINE

## 2023-08-24 PROCEDURE — 99999 PR PBB SHADOW E&M-EST. PATIENT-LVL IV: CPT | Mod: PBBFAC,,, | Performed by: FAMILY MEDICINE

## 2023-08-24 PROCEDURE — 3074F SYST BP LT 130 MM HG: CPT | Mod: CPTII,S$GLB,, | Performed by: FAMILY MEDICINE

## 2023-08-24 PROCEDURE — 3078F DIAST BP <80 MM HG: CPT | Mod: CPTII,S$GLB,, | Performed by: FAMILY MEDICINE

## 2023-08-24 PROCEDURE — 1159F PR MEDICATION LIST DOCUMENTED IN MEDICAL RECORD: ICD-10-PCS | Mod: CPTII,S$GLB,, | Performed by: FAMILY MEDICINE

## 2023-08-24 PROCEDURE — 99397 PR PREVENTIVE VISIT,EST,65 & OVER: ICD-10-PCS | Mod: S$GLB,,, | Performed by: FAMILY MEDICINE

## 2023-08-24 PROCEDURE — 36415 COLL VENOUS BLD VENIPUNCTURE: CPT | Mod: PO | Performed by: FAMILY MEDICINE

## 2023-08-24 PROCEDURE — 85025 COMPLETE CBC W/AUTO DIFF WBC: CPT | Performed by: FAMILY MEDICINE

## 2023-08-24 PROCEDURE — 80061 LIPID PANEL: CPT | Performed by: FAMILY MEDICINE

## 2023-08-24 PROCEDURE — 1159F MED LIST DOCD IN RCRD: CPT | Mod: CPTII,S$GLB,, | Performed by: FAMILY MEDICINE

## 2023-08-24 PROCEDURE — 84466 ASSAY OF TRANSFERRIN: CPT | Performed by: FAMILY MEDICINE

## 2023-08-24 PROCEDURE — 82728 ASSAY OF FERRITIN: CPT | Performed by: FAMILY MEDICINE

## 2023-08-24 PROCEDURE — 83036 HEMOGLOBIN GLYCOSYLATED A1C: CPT | Performed by: FAMILY MEDICINE

## 2023-08-24 PROCEDURE — 1126F AMNT PAIN NOTED NONE PRSNT: CPT | Mod: CPTII,S$GLB,, | Performed by: FAMILY MEDICINE

## 2023-08-24 PROCEDURE — 4010F PR ACE/ARB THEARPY RXD/TAKEN: ICD-10-PCS | Mod: CPTII,S$GLB,, | Performed by: FAMILY MEDICINE

## 2023-08-24 PROCEDURE — 3008F PR BODY MASS INDEX (BMI) DOCUMENTED: ICD-10-PCS | Mod: CPTII,S$GLB,, | Performed by: FAMILY MEDICINE

## 2023-08-24 RX ORDER — FLUTICASONE PROPIONATE 50 MCG
1 SPRAY, SUSPENSION (ML) NASAL DAILY
Qty: 16 G | Refills: 3 | Status: SHIPPED | OUTPATIENT
Start: 2023-08-24

## 2023-08-24 RX ORDER — LEVOTHYROXINE SODIUM 25 UG/1
25 TABLET ORAL DAILY
Qty: 90 TABLET | Refills: 3 | Status: SHIPPED | OUTPATIENT
Start: 2023-08-24

## 2023-08-24 RX ORDER — CETIRIZINE HYDROCHLORIDE 10 MG/1
10 TABLET ORAL NIGHTLY
Qty: 90 TABLET | Refills: 1 | Status: SHIPPED | OUTPATIENT
Start: 2023-08-24 | End: 2023-11-21

## 2023-08-24 RX ORDER — ATORVASTATIN CALCIUM 20 MG/1
20 TABLET, FILM COATED ORAL DAILY
Qty: 90 TABLET | Refills: 3 | Status: SHIPPED | OUTPATIENT
Start: 2023-08-24

## 2023-08-24 RX ORDER — BENAZEPRIL HYDROCHLORIDE AND HYDROCHLOROTHIAZIDE 20; 12.5 MG/1; MG/1
1 TABLET ORAL DAILY
Qty: 90 TABLET | Refills: 3 | Status: SHIPPED | OUTPATIENT
Start: 2023-08-24

## 2023-08-24 NOTE — PROGRESS NOTES
Subjective:       Patient ID: Concepcion Don is a 68 y.o. female.    Chief Complaint: Annual Exam    Concepcion Don is a 68 y.o. female and is here for a comprehensive physical exam.    Do you take any herbs or supplements that were not prescribed by a doctor? turmeric  Are you taking calcium supplements? caltrate  Are you taking aspirin daily? no     History:  Any STD's in the past? none    The following portions of the patient's history were reviewed and updated as appropriate: allergies, current medications, past family history, past medical history, past social history, past surgical history and problem list.    Review of Systems  Do you have pain that bothers you in your daily life? no  Pertinent items are noted in HPI.      2. Patient Counseling:  --Nutrition: Stressed importance of moderation in sodium/caffeine intake, saturated fat and cholesterol, caloric balance.  --Exercise: Stressed the importance of regular exercise.   --Substance Abuse: Discussed cessation/primary prevention of tobacco, alcohol - nonuser.   --Sexuality: Discussed sexually transmitted disease.  --Injury prevention: Discussed safety belts, smoke detector.   --Dental health: Discussed dental health.  --Immunizations reviewed.    3. Discussed the patient's BMI.  4. Follow up as needed for acute illness          Review of Systems   Constitutional:  Negative for activity change, fever and unexpected weight change.   HENT:  Negative for congestion, hearing loss, rhinorrhea and trouble swallowing.    Eyes:  Negative for discharge and visual disturbance.   Respiratory:  Negative for chest tightness, shortness of breath and wheezing.    Cardiovascular:  Negative for chest pain and palpitations.   Gastrointestinal:  Negative for abdominal pain, blood in stool, constipation, diarrhea and vomiting.   Endocrine: Negative for polydipsia and polyuria.   Genitourinary:  Negative for difficulty urinating, dysuria, hematuria and menstrual problem.    Musculoskeletal:  Positive for arthralgias and neck pain. Negative for joint swelling.   Neurological:  Negative for dizziness, weakness and headaches.   Psychiatric/Behavioral:  Negative for agitation, confusion and dysphoric mood.        Objective:      Physical Exam  Vitals and nursing note reviewed.   Constitutional:       General: She is not in acute distress.     Appearance: Normal appearance. She is well-developed. She is not diaphoretic.   HENT:      Head: Normocephalic and atraumatic.   Eyes:      General: No scleral icterus.     Conjunctiva/sclera: Conjunctivae normal.   Cardiovascular:      Rate and Rhythm: Normal rate and regular rhythm.   Pulmonary:      Effort: Pulmonary effort is normal. No respiratory distress.      Breath sounds: Normal breath sounds. No wheezing.   Abdominal:      General: There is no distension.      Palpations: Abdomen is soft.      Tenderness: There is no abdominal tenderness. There is no guarding.   Skin:     General: Skin is warm.      Coloration: Skin is not pale.      Findings: No erythema or rash.      Comments: Good turgor   Neurological:      Mental Status: She is alert.   Psychiatric:         Mood and Affect: Mood normal.         Thought Content: Thought content normal.         Assessment:       1. Wellness examination    2. Essential hypertension    3. Other hyperlipidemia    4. Thyroid disease        Plan:     Problem List Items Addressed This Visit          Cardiac/Vascular    Essential hypertension    Overview     Chronic, Stable, cont lotensin         Relevant Medications    benazepril-hydrochlorthiazide (LOTENSIN HCT) 20-12.5 mg per tablet    Other hyperlipidemia    Overview     Chronic, Stable, cont statin         Relevant Medications    atorvastatin (LIPITOR) 20 MG tablet       Endocrine    Thyroid disease    Relevant Medications    levothyroxine (SYNTHROID) 25 MCG tablet       Other    Wellness examination - Primary    Relevant Orders    CBC Auto Differential     Comprehensive Metabolic Panel    Hemoglobin A1C    Ferritin    Iron and TIBC    Lipid Panel    TSH    T4, FREE

## 2023-08-25 LAB
FERRITIN SERPL-MCNC: 239 NG/ML (ref 20–300)
T4 FREE SERPL-MCNC: 0.88 NG/DL (ref 0.71–1.51)
TSH SERPL DL<=0.005 MIU/L-ACNC: 1.12 UIU/ML (ref 0.4–4)

## 2023-09-28 RX ORDER — ASPIRIN 81 MG/1
81 TABLET ORAL
Qty: 360 TABLET | Refills: 1 | Status: SHIPPED | OUTPATIENT
Start: 2023-09-28

## 2023-11-21 RX ORDER — CETIRIZINE HYDROCHLORIDE 10 MG/1
10 TABLET, FILM COATED ORAL NIGHTLY
Qty: 90 TABLET | Refills: 1 | Status: SHIPPED | OUTPATIENT
Start: 2023-11-21

## 2023-11-27 PROBLEM — Z00.00 WELLNESS EXAMINATION: Status: RESOLVED | Noted: 2022-08-23 | Resolved: 2023-11-27

## 2023-12-05 ENCOUNTER — PATIENT MESSAGE (OUTPATIENT)
Dept: INTERNAL MEDICINE | Facility: CLINIC | Age: 68
End: 2023-12-05
Payer: COMMERCIAL

## 2024-03-13 NOTE — TELEPHONE ENCOUNTER
I have reviewed discharge instructions with the patient.  The patient verbalized understanding.    Patient left ED via Discharge Method: ambulatory to Home with self    Opportunity for questions and clarification provided.       Patient given 2 scripts.         To continue your aftercare when you leave the hospital, you may receive an automated call from our care team to check in on how you are doing.  This is a free service and part of our promise to provide the best care and service to meet your aftercare needs.” If you have questions, or wish to unsubscribe from this service please call [phone].  Thank you for Choosing our [facility] Emergency Department.      Please advise per pt portal message

## 2024-05-09 ENCOUNTER — OFFICE VISIT (OUTPATIENT)
Dept: PODIATRY | Facility: CLINIC | Age: 69
End: 2024-05-09
Payer: COMMERCIAL

## 2024-05-09 VITALS — HEIGHT: 65 IN | WEIGHT: 264.75 LBS | BODY MASS INDEX: 44.11 KG/M2

## 2024-05-09 DIAGNOSIS — L85.3 XEROSIS CUTIS: ICD-10-CM

## 2024-05-09 DIAGNOSIS — B35.3 TINEA PEDIS OF BOTH FEET: Primary | ICD-10-CM

## 2024-05-09 PROCEDURE — 4010F ACE/ARB THERAPY RXD/TAKEN: CPT | Mod: CPTII,S$GLB,, | Performed by: PODIATRIST

## 2024-05-09 PROCEDURE — 99999 PR PBB SHADOW E&M-EST. PATIENT-LVL IV: CPT | Mod: PBBFAC,,, | Performed by: PODIATRIST

## 2024-05-09 PROCEDURE — 1101F PT FALLS ASSESS-DOCD LE1/YR: CPT | Mod: CPTII,S$GLB,, | Performed by: PODIATRIST

## 2024-05-09 PROCEDURE — 3008F BODY MASS INDEX DOCD: CPT | Mod: CPTII,S$GLB,, | Performed by: PODIATRIST

## 2024-05-09 PROCEDURE — 99204 OFFICE O/P NEW MOD 45 MIN: CPT | Mod: S$GLB,,, | Performed by: PODIATRIST

## 2024-05-09 PROCEDURE — 1126F AMNT PAIN NOTED NONE PRSNT: CPT | Mod: CPTII,S$GLB,, | Performed by: PODIATRIST

## 2024-05-09 PROCEDURE — 3288F FALL RISK ASSESSMENT DOCD: CPT | Mod: CPTII,S$GLB,, | Performed by: PODIATRIST

## 2024-05-09 PROCEDURE — 1159F MED LIST DOCD IN RCRD: CPT | Mod: CPTII,S$GLB,, | Performed by: PODIATRIST

## 2024-05-09 RX ORDER — MONTELUKAST SODIUM 10 MG/1
TABLET ORAL
COMMUNITY
Start: 2024-03-07

## 2024-05-09 RX ORDER — KETOCONAZOLE 20 MG/G
CREAM TOPICAL 2 TIMES DAILY
Qty: 30 G | Refills: 2 | Status: SHIPPED | OUTPATIENT
Start: 2024-05-09

## 2024-05-09 RX ORDER — PROMETHAZINE HYDROCHLORIDE AND DEXTROMETHORPHAN HYDROBROMIDE 6.25; 15 MG/5ML; MG/5ML
SYRUP ORAL
COMMUNITY
Start: 2024-04-03

## 2024-05-09 NOTE — PROGRESS NOTES
Podiatry Department    Patient ID: Concepcion Don is a 68 y.o. female.    Chief Complaint: Recurrent Skin Infections (C/o skin on feet is peeling badly, pt rates pain 0/10 , pt states this been going on since for about a week, been using urea cream on them and Vaseline, pt states it isn't working well for her, pt is non-diabetic)    History of Present Illness    CHIEF COMPLAINT:  Patient presents today for peeling feet    ROS:  Skin: +lesion            Physical Exam    Skin: Scales with peeling of skin noted in bilateral plantar foot, entire plantar surfaces. No peeling noted on dorsal feet. Slight discoloration on toenails x3.  Musculoskeletal: Mild edema on bilateral lower extremities.  Cardiovascular: Palpable pedal pulses.           Diagnoses:  1. Tinea pedis of both feet    2. Xerosis cutis    Other orders  -     ketoconazole (NIZORAL) 2 % cream; Apply topically 2 (two) times daily. For two weeks  Dispense: 30 g; Refill: 2        Assessment & Plan    FUNGAL INFECTION/SEVERE DRY SKIN:  - Prescribed ketoconazole, an antifungal cream, for the patient to apply twice daily for 2 weeks.  - Recommend a subsequent application of a thin layer of hydrocortisone cream, followed by Aquaphor, a petroleum-based cream, to seal the medication.  - Instructed the patient to continue using Aquaphor as a body cream after the 2-week period.  - Educated the patient on the correct application of the prescribed creams, emphasizing the necessity of thoroughly rubbing them into the skin for effective penetration.  - Suggested the use of socks to aid in medication absorption, particularly during the night.  - Discussed potential causes of the patient's condition, including fungal infection or severe dry skin.  - Implied a potential adjustment of the treatment plan based on the patient's progress, although a specific follow-up visit was not set.              No future appointments.     This note was generated with the assistance of  ambient listening technology. Verbal consent was obtained by the patient and accompanying visitor(s) for the recording of patient appointment to facilitate this note. I attest to having reviewed and edited the generated note for accuracy, though some syntax or spelling errors may persist. Please contact the author of this note for any clarification.      Report Electronically Signed By:     Belkys Grigsby DPM   Podiatry  [facility]  5/9/2024

## 2024-05-09 NOTE — PATIENT INSTRUCTIONS
Dear Ms. Don,    It was very nice meeting you today. I have enclosed further information about our visit today. Please let me know if you have any questions or concerns. Have a great day.    Instructions:    Purchase the prescription forANTIFUNGAL CREAM (SENT TO YOUR PHARMACY) Apply at the infected area  twice daily  TO BOTTOM OF FEET.    THEN APPLY HYDROCORTISONE CREAM 1% (PURCHASE OVER THE COUNTER) TO BOTTOM OF BOTH FEET    THEN APPLY AQUAPHOR HEALING OINTMENT (LAST) TO BOTTOM OF BOTH FEET    WASH YOUR FEET WHEN SHOWERING WITH ANTIBACTERIAL (DIAL) SOAP  Wear cotton socks (80%) or open shoes and sandals.  KEEP YOUR FEET DRY!    I will see you in follow up visit as scheduled.       Athletes Foot    Athletes Foot is caused by a fungal infection in the skin. It affects the skin between the toes where it causes fissures (cracks in the skin). It can also affect the bottom of the foot where it causes dry white scales and peeling of the skin. This infection is more likely to occur when the foot is in hot, sweaty socks and shoes for long periods of time.  This infection is treated with skin creams or oral medicine.  Home Care:  It is important to keep the feet dry. Use absorbent cotton socks and change them if they become sweaty; or wear an open-toe shoe or sandal. Wash the feet at least once a day with soap and water.  Apply the antifungal cream as prescribed. Some antifungal creams are available without a prescription (Lotrimin, Tinactin).  It may take a week before the rash starts to improve and it can take about three to four weeks to completely clear. Continue the medicine until the rash is all gone.  Use over-the-counter antifungal powders or sprays on your feet after exposure to high-risk environments (public showers, gyms and locker rooms) may prevent future infections. You may wish to use appropriate footwear to reduce exposure.  Follow Up  with your doctor as recommended by our staff if the rash is not  starting to improve after TEN days of treatment, or if the rash continues to spread.  Get Prompt Medical Attention  if any of the following occur:  Increasing redness or swelling of the foot  Pus draining from cracks in the skin  Fever of 100.4ºF (38ºC) or higher, or as directed by your healthcare provider  © 2233-1782 [employer], [address] [place] [address], [place] [address]. All rights reserved. This information is not intended as a substitute for professional medical care. Always follow your healthcare professional's instructions.

## 2025-01-15 DIAGNOSIS — M17.12 ARTHRITIS OF LEFT KNEE: Primary | ICD-10-CM

## 2025-02-18 ENCOUNTER — CLINICAL SUPPORT (OUTPATIENT)
Dept: REHABILITATION | Facility: HOSPITAL | Age: 70
End: 2025-02-18
Payer: COMMERCIAL

## 2025-02-18 DIAGNOSIS — R26.89 FUNCTIONAL GAIT ABNORMALITY: ICD-10-CM

## 2025-02-18 DIAGNOSIS — R68.89 DECREASED STRENGTH, ENDURANCE, AND MOBILITY: ICD-10-CM

## 2025-02-18 DIAGNOSIS — Z74.09 DECREASED STRENGTH, ENDURANCE, AND MOBILITY: ICD-10-CM

## 2025-02-18 DIAGNOSIS — R53.1 DECREASED STRENGTH, ENDURANCE, AND MOBILITY: ICD-10-CM

## 2025-02-18 DIAGNOSIS — M25.662 DECREASED RANGE OF MOTION OF LEFT KNEE: Primary | ICD-10-CM

## 2025-02-18 PROCEDURE — 97110 THERAPEUTIC EXERCISES: CPT

## 2025-02-18 PROCEDURE — 97140 MANUAL THERAPY 1/> REGIONS: CPT

## 2025-02-18 PROCEDURE — 97112 NEUROMUSCULAR REEDUCATION: CPT

## 2025-02-18 PROCEDURE — 97162 PT EVAL MOD COMPLEX 30 MIN: CPT

## 2025-02-18 NOTE — LETTER
February 19, 2025  Willie Melendrez MD  [id number]  [facility] [id number]    To whom it may concern,     The attached plan of care is being sent to you for review and reference.    You may indicate your approval by signing the document electronically, or by faxing/mailing a signed copy of the final page of this document back to the attention of Pat Miller, PT:         Plan of Care 2/19/25   Effective from: 2/19/2025  Effective to: 4/2/2025    Plan ID: [id number]            Participants as of Finalize on 2/19/2025    Name Type Comments Contact Info    Willie Melendrez MD Referring Provider  [phone]    Pat Miller PT Physical Therapist         Last Plan Note     Author: Pat Miller PT Status: Incomplete Last edited: 2/18/2025  7:30 AM         Outpatient Rehab    Physical Therapy Evaluation    Patient Name: Concepcion Don  MRN: [id number]  YOB: 1955  Today's Date: 2/19/2025    Therapy Diagnosis:   Encounter Diagnoses   Name Primary?    Decreased range of motion of left knee Yes    Decreased strength, endurance, and mobility     Functional gait abnormality      Physician: Willie Melendrez MD    Physician Orders: Eval and Treat  Medical Diagnosis: Arthritis of left knee [M17.12]     Visit # / Visits Authorized:  1 / 1   Date of Evaluation:  2/18/2025   Insurance Authorization Period: 1/15/2025 to 1/15/2026  Plan of Care Certification:  2/18/2025 to 4/2/2025      Time In: 0740   Time Out: 0833  Total Time: 53   Total Billable Time: 53    Intake Outcome Measure for FOTO Survey    Therapist reviewed FOTO scores for Concepcion Don on 2/18/2025.   FOTO report - see Media section or FOTO account episode details.     Intake Score: 19%    Precautions  Left Lower Extremity Weight-Bearing Status: Weight-bearing as tolerated         Subjective  History of Present Illness  Concepcion is a 69 y.o. female who reports to physical therapy with a chief concern of S/p left TKA on  2/10/2025. According to the patient's chart, Concepcion has a past medical history of Abnormality of gait and mobility, Achilles tendinitis of left lower extremity, Acute pain of right hip, Bronchitis, Cataract, Cellulitis, Cellulitis, Cellulitis of left lower extremity, Chest discomfort, Chest pain, Chronic pain of left ankle, Class 3 obesity due to excess calories with serious comorbidity in adult, Delayed immunizations, Encounter for long-term (current) use of medications, Hordeolum externum of right upper eyelid, Hyperlipidemia, Hypertension, Localized edema, Musculoskeletal leg pain, right, Nasal sore, Preop examination, Right-sided low back pain with right-sided sciatica, Smell and taste disorder, Thyroid disease, and Viral upper respiratory tract infection. Concepcion has a past surgical history that includes Hysterectomy;  section, classic; Rotator cuff repair (Right); Knee surgery (); and cataract surgery (2019).    The patient reports a medical diagnosis of Arthritis of left knee.  Patient reports a surgery of S/p left TKA on 2/10/2025.  Diagnostic tests related to this condition: X-ray.   X-Ray Details: 2/10/2025    History of Present Condition/Illness: Patient reports to therapy following left TKA replacement on 2/10/2025. Patient reports that she needed a transfusion so she stayed in the hospital until Wednesday. Patient reports that she is requiring increased time when performing all functional tasks. Patient reports that she is staying with her son and his family temporarily until she heals up.     Activities of Daily Living  Social history was obtained from Patient.    General Prior Level of Function Comments: Independent and pain free with all ADL, IADL, community mobility and functional activities.  General Current Level of Function Comments: Independent with all ADL, IADL, community mobility and functional activities with reports of increased pain and need for increased time and frequent  breaks.  Patient Responsibilities: Community mobility, Home management, Health management, Meal prep, Shopping, Personal ADL    Previously independent with activities of daily living? Yes     Currently independent with activities of daily living? No  Activities currently needing assistance include Bathing, Dressing - lower body, Bed mobility, Grooming, and Toileting.        Previously independent with instrumental activities of daily living? Yes     Currently independent with instrumental activities of daily living? No  Activities currently needing assistance include: Driving and Grocery/shopping.            Pain     Patient reports a current pain level of 6/10. Pain at best is reported as 5/10. Pain at worst is reported as 9/10.   Location: left medial knee  Clinical Progression (since onset): Stable  Pain Qualities: Burning, Aching  Pain-Relieving Factors: Ice, Medications - prescription  Pain-Aggravating Factors: Other (Comment), Straightening, Bending  Other Pain-Aggravating Factors: unexpected movement         Living Arrangements  Living Situation  Housing: Home independently  Support Systems: Family members    Home Setup  Type of Structure: House        Employment  Patient reports: Does the patient's condition impact their ability to work?  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      Past Medical History/Physical Systems Review:   Concepcion Don  has a past medical history of Abnormality of gait and mobility, Achilles tendinitis of left lower extremity, Acute pain of right hip, Bronchitis, Cataract, Cellulitis, Cellulitis, Cellulitis of left lower extremity, Chest discomfort, Chest pain, Chronic pain of left ankle, Class 3 obesity due to excess calories with serious comorbidity in adult, Delayed immunizations, Encounter for long-term (current) use of medications, Hordeolum externum of right upper eyelid, Hyperlipidemia, Hypertension, Localized edema,  Musculoskeletal leg pain, right, Nasal sore, Preop examination, Right-sided low back pain with right-sided sciatica, Smell and taste disorder, Thyroid disease, and Viral upper respiratory tract infection.    Concepcion Don  has a past surgical history that includes Hysterectomy;  section, classic; Rotator cuff repair (Right); Knee surgery (2015); and cataract surgery (2019).    Concepcion has a current medication list which includes the following prescription(s): acetaminophen, aspirin, atorvastatin, benazepril-hydrochlorthiazide, allergy relief (cetirizine), docusate sodium, ergocalciferol (vitamin d2), fluticasone propionate, ketoconazole, levothyroxine, meloxicam, montelukast, multivitamin, promethazine-dextromethorphan, psyllium seed (with sugar), and turmeric/ging/olive/oreg/capry.    Review of patient's allergies indicates:  No Known Allergies     Objective  Posture                 Left knee position is: Flexed       Knee Observations  Right Knee Observations  Not Present: Straight Leg Raise Extensor Lag  Left Knee Observations  Present: Edema and Straight Leg Raise Extensor Lag             Knee Swelling  Location of Measurement Right  (cm) Left  (cm)   20 cm Above Joint Line       10 cm Vastus Medialis Oblique       At Joint Line 56 67   15 cm Below Joint Line       3 cm above R: 63 cm, left: 71 cm         3 cm below R: 45 cm, left: 51 cm         Knee Range of Motion   Right Knee   Active (deg) Passive (deg) Pain   Flexion 100       Extension 0           Left Knee   Active (deg) Passive (deg) Pain   Flexion 45 70 Yes   Extension -15 -10 Yes                      Hip Strength - Planes of Motion   Right Strength Right Pain Left Strength Left  Pain   Flexion (L2) 4   2+     Extension 2  (seated) 2  (seated)   ABduction 3+   2+     ADduction 3+   2+     Internal Rotation 3+   2+     External Rotation 3+   2+         Knee Strength   Right Strength Right Pain Left Strength Left  Pain   Flexion (S2) 3+   2+      Prone Flexion           Extension (L3) 3+   2+       Knee Extensor Lag  Lag Present: Left  No Lag: Right           Knee Patellar Screening       Right Patellar Mobility  Normal: Medial, Lateral, Superior, and Inferior  Left Patellar Mobility  Hypomobile: Medial, Lateral, Superior, and Inferior           Transfers Assessment  Sit to Stand Assistance: Setup/cleanup  Chair to Bed Assistance: Supervision  Bed to Chair Assistance: Supervision    Bed Mobility Assessment  Rolling Assistance: Setup/cleanup  Sidelying to Sit Assistance: Setup/cleanup  Sit to Sidelying Assistance: Setup/cleanup  Scooting to Edge of Bed Assistance: Setup/cleanup      Sit to Stand Testing  The patient completed 5 sit to stand transfers in 56.24 sec.                Gait Analysis  Base of Support: Wide  Gait Pattern: Antalgic  Walking Speed: Decreased         Left Side Walking Observations  Decreased: Stance Time, Swing Time, Step Length, and Arm Swing  Left Foot Contact Pattern: Forefoot  Knee Observations During Gait  Left: Decreased Knee Extension in Initial Contact, Knee Decreased Extension in Midstance, and Knee Hyperextension Stance Phase         Treatment:       CPT  Intervention  Performed    Today  Duration / Intensity    MT  Knee Extension Mobilizations  x        Patellar Mobilizations  x      TE  PROM Knee Flexion  x                          NMR  Educated patient on the impairments present and the plan of care to address impairments  x        Educated patient on the proper form and sequencing of all exercises provided in HEP today  x        Discussed signs of DVT and to keep an eye out if these symptoms arise  x      TA                                                                                PLAN  NuStep/recumbent bike   PROM Knee Flexion/Extension   Quad Sets + NMES   Short Arc Quads + NMES   Heel Slides    Ball Squeezes   Sit to Stands                CPT Codes available for Billing:    (08) minutes of Manual therapy (MT) to  improve pain and ROM.   (08) minutes of Therapeutic Exercise (TE) to develop strength, endurance, range of motion, and flexibility.   (12) minutes of Neuromuscular Re-Education (NMR)? to improve: Balance, Coordination, Kinesthetic, Sense, Proprioception, and Posture.   (00) minutes of Therapeutic Activities (TA) to improve functional performance.   Vasopneumatic Device Therapy () for management of swelling/edema. ([id number])   Unattended Electrical Stimulation (ES) for muscle performance or pain modulation.   BFR: Blood flow restriction applied during exercise        Assessment & Plan  Assessment  Concepcion presents with a condition of Moderate complexity.   Presentation of Symptoms: Stable  Will Comorbidities Impact Care: Yes  See Snapshot for details         Patient Goal for Therapy (PT): Patients goal is to have more independence and less pain with all functional tasks.  Prognosis: Good  Prognosis Details: Anticipated Barriers for therapy: co-morbidities, sedentary lifestyle, and transportation   Assessment Details: Pt presents with impairments in the following categories: IMPAIRMENTS: ROM, strength, endurance, joint mobility, muscle length, balance, posture, gait mechanics, core strength and stability, functional movement patterns, edema and post-operative precautions. Pt will benefit from skilled outpatient Physical Therapy to address the deficits stated above, provide pt/family education, and to maximize pt's level of independence.      Plan  From a physical therapy perspective, the patient would benefit from: Skilled Rehab Services                Visit Frequency: 2 times Per Week for 6 Weeks.       This plan was discussed with Patient.   Discussion participants: Agreed Upon Plan of Care  Plan details: Outpatient Physical Therapy to include any combination of the following interventions: virtual visits, dry needling, modalities, electrical stimulation (IFC, Pre-Mod, Attended with Functional Dry Needling), Aquatic  Therapy, Cervical/Lumbar Traction, Gait Training, Manual Therapy, Neuromuscular Re-ed, Patient Education, Self Care, Therapeutic Exercise, and Therapeutic Activites           Patient's spiritual, cultural, and educational needs considered and patient agreeable to plan of care and goals.     Education  Education was done with Patient. The patient's learning style includes Demonstration. The patient Demonstrates understanding.         PURPOSE: Patient educated on the impairments noted above and the effects of physical therapy intervention to improve overall condition and QOL.  EXERCISE: Patient was educated on all the above exercise prior/during/after for proper posture, positioning, and execution for safe performance with home exercise program.  STRENGTH: Patient educated on the importance of improved core and extremity strength in order to improve alignment of the spine and extremities with static positions and dynamic movement.  GAIT & BALANCE: Patient educated on the importance of strong core and lower extremity musculature in order to improve both static and dynamic balance, improve gait mechanics, reduce fall risk and improve household and community mobility.  ASSISTIVE DEVICE: Patient educated on proper utilization of assistive device for safe and efficient ambulation and to reduce risk of falls POSTURE: Patient educated on postural awareness to reduce stress and maintain optimal alignment of the spine with static positions and dynamic movement  TRANSFERS & TRANSITIONS: Patient educated on proper technique for bed mobility, transitions and transfers to improve body mechanics and decrease risk of injury.  POST-OP PRECAUTIONS: patient educated on post-operative precautions in order to protect surgical repair, decrease risk of injury and promote healing.  POLICIES: Educated patient on scheduling, cancelation and no-show policy.        Goals:   Active       Long Term Goals       Pt will report worst pain of 2/10 in  order to progress toward max functional ability and improve quality of life                Start:  02/19/25    Expected End:  04/02/25            Patient will improve strength to at least 4+/5 grossly,  in order to improve functional independence and quality of life.         Start:  02/19/25    Expected End:  04/02/25            Patient will demonstrate improved function as indicated by a score of greater than or equal to 50 out of 100 on FOTO.                Start:  02/19/25    Expected End:  04/02/25               Short Term Goals       Patient will improve knee extension ROM to 0 and knee flexion to 125 degrees in order to progress towards independence with functional activities.         Start:  02/19/25    Expected End:  03/12/25            Pt will report worst pain of 5/10 in order to progress toward max functional ability and improve quality of life                Start:  02/19/25    Expected End:  03/12/25            Patient will demonstrate improved function as indicated by a score of greater than or equal to 30 out of 100 on FOTO.                Start:  02/19/25    Expected End:  03/12/25                Pat Miller PT           Current Participants as of 2/19/2025    Name Type Comments Contact Info    Willie Melendrez MD Referring Provider  [phone]    Signature pending    Pat Miller PT Physical Therapist                  Sincerely,      Pat Miller PT  [facility]                                                            Dear Pat Miller PT,    RE: Ms. Concepcion Don, MRN: [id number]    I certify that I have reviewed the attached plan of care and agree to the details within.        ___________________________  ___________________________  Patient Printed Name    Patient Signed Name      ___________________________  Date and Time

## 2025-02-19 PROBLEM — M25.662 DECREASED RANGE OF MOTION OF LEFT KNEE: Status: ACTIVE | Noted: 2025-02-19

## 2025-02-19 PROBLEM — Z74.09 DECREASED STRENGTH, ENDURANCE, AND MOBILITY: Status: ACTIVE | Noted: 2025-02-19

## 2025-02-19 PROBLEM — R68.89 DECREASED STRENGTH, ENDURANCE, AND MOBILITY: Status: ACTIVE | Noted: 2025-02-19

## 2025-02-19 PROBLEM — R26.89 FUNCTIONAL GAIT ABNORMALITY: Status: ACTIVE | Noted: 2025-02-19

## 2025-02-19 PROBLEM — R53.1 DECREASED STRENGTH, ENDURANCE, AND MOBILITY: Status: ACTIVE | Noted: 2025-02-19

## 2025-02-19 NOTE — PATIENT INSTRUCTIONS
Pictures are unavailable at this time but your exercises include:    Heel Slides for 3 minutes 3 times per day  Quad Sets x30 reps every hour  Knee Extension Stretch for 3-5 minutes 3 times per day  Elevated Ankle Pumps x30 reps   Elevation of LE above heart level for 15 minutes at least 3 times per day

## 2025-02-19 NOTE — PROGRESS NOTES
Outpatient Rehab    Physical Therapy Evaluation    Patient Name: Concepcion Don  MRN: [id number]  YOB: 1955  Today's Date: 2/19/2025    Therapy Diagnosis:   Encounter Diagnoses   Name Primary?    Decreased range of motion of left knee Yes    Decreased strength, endurance, and mobility     Functional gait abnormality      Physician: Willie Melendrez MD    Physician Orders: Eval and Treat  Medical Diagnosis: Arthritis of left knee [M17.12]     Visit # / Visits Authorized:  1 / 1   Date of Evaluation:  2/18/2025   Insurance Authorization Period: 1/15/2025 to 1/15/2026  Plan of Care Certification:  2/18/2025 to 4/2/2025      Time In: 0740   Time Out: 0833  Total Time: 53   Total Billable Time: 53    Intake Outcome Measure for FOTO Survey    Therapist reviewed FOTO scores for Concepcion Don on 2/18/2025.   FOTO report - see Media section or FOTO account episode details.     Intake Score: 19%    Precautions  Left Lower Extremity Weight-Bearing Status: Weight-bearing as tolerated         Subjective   History of Present Illness  Concepcion is a 69 y.o. female who reports to physical therapy with a chief concern of S/p left TKA on 2/10/2025. According to the patient's chart, Concepcion has a past medical history of Abnormality of gait and mobility, Achilles tendinitis of left lower extremity, Acute pain of right hip, Bronchitis, Cataract, Cellulitis, Cellulitis, Cellulitis of left lower extremity, Chest discomfort, Chest pain, Chronic pain of left ankle, Class 3 obesity due to excess calories with serious comorbidity in adult, Delayed immunizations, Encounter for long-term (current) use of medications, Hordeolum externum of right upper eyelid, Hyperlipidemia, Hypertension, Localized edema, Musculoskeletal leg pain, right, Nasal sore, Preop examination, Right-sided low back pain with right-sided sciatica, Smell and taste disorder, Thyroid disease, and Viral upper respiratory tract infection. Concepcion has a past surgical history  that includes Hysterectomy;  section, classic; Rotator cuff repair (Right); Knee surgery (2015); and cataract surgery (2019).    The patient reports a medical diagnosis of Arthritis of left knee.  Patient reports a surgery of S/p left TKA on 2/10/2025.  Diagnostic tests related to this condition: X-ray.   X-Ray Details: 2/10/2025    History of Present Condition/Illness: Patient reports to therapy following left TKA replacement on 2/10/2025. Patient reports that she needed a transfusion so she stayed in the hospital until Wednesday. Patient reports that she is requiring increased time when performing all functional tasks. Patient reports that she is staying with her son and his family temporarily until she heals up.     Activities of Daily Living  Social history was obtained from Patient.    General Prior Level of Function Comments: Independent and pain free with all ADL, IADL, community mobility and functional activities.  General Current Level of Function Comments: Independent with all ADL, IADL, community mobility and functional activities with reports of increased pain and need for increased time and frequent breaks.  Patient Responsibilities: Community mobility, Home management, Health management, Meal prep, Shopping, Personal ADL    Previously independent with activities of daily living? Yes     Currently independent with activities of daily living? No  Activities currently needing assistance include Bathing, Dressing - lower body, Bed mobility, Grooming, and Toileting.        Previously independent with instrumental activities of daily living? Yes     Currently independent with instrumental activities of daily living? No  Activities currently needing assistance include: Driving and Grocery/shopping.            Pain     Patient reports a current pain level of 6/10. Pain at best is reported as 5/10. Pain at worst is reported as 9/10.   Location: left medial knee  Clinical Progression (since onset):  Stable  Pain Qualities: Burning, Aching  Pain-Relieving Factors: Ice, Medications - prescription  Pain-Aggravating Factors: Other (Comment), Straightening, Bending  Other Pain-Aggravating Factors: unexpected movement         Living Arrangements  Living Situation  Housing: Home independently  Support Systems: Family members    Home Setup  Type of Structure: House        Employment  Patient reports: Does the patient's condition impact their ability to work?  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      Past Medical History/Physical Systems Review:   Concepcion Don  has a past medical history of Abnormality of gait and mobility, Achilles tendinitis of left lower extremity, Acute pain of right hip, Bronchitis, Cataract, Cellulitis, Cellulitis, Cellulitis of left lower extremity, Chest discomfort, Chest pain, Chronic pain of left ankle, Class 3 obesity due to excess calories with serious comorbidity in adult, Delayed immunizations, Encounter for long-term (current) use of medications, Hordeolum externum of right upper eyelid, Hyperlipidemia, Hypertension, Localized edema, Musculoskeletal leg pain, right, Nasal sore, Preop examination, Right-sided low back pain with right-sided sciatica, Smell and taste disorder, Thyroid disease, and Viral upper respiratory tract infection.    Concepcion Don  has a past surgical history that includes Hysterectomy;  section, classic; Rotator cuff repair (Right); Knee surgery (2015); and cataract surgery (2019).    Concepcion has a current medication list which includes the following prescription(s): acetaminophen, aspirin, atorvastatin, benazepril-hydrochlorthiazide, allergy relief (cetirizine), docusate sodium, ergocalciferol (vitamin d2), fluticasone propionate, ketoconazole, levothyroxine, meloxicam, montelukast, multivitamin, promethazine-dextromethorphan, psyllium seed (with sugar), and turmeric/ging/olive/oreg/capry.    Review of  patient's allergies indicates:  No Known Allergies     Objective   Posture                 Left knee position is: Flexed       Knee Observations  Right Knee Observations  Not Present: Straight Leg Raise Extensor Lag  Left Knee Observations  Present: Edema and Straight Leg Raise Extensor Lag             Knee Swelling  Location of Measurement Right  (cm) Left  (cm)   20 cm Above Joint Line       10 cm Vastus Medialis Oblique       At Joint Line 56 67   15 cm Below Joint Line       3 cm above R: 63 cm, left: 71 cm         3 cm below R: 45 cm, left: 51 cm         Knee Range of Motion   Right Knee   Active (deg) Passive (deg) Pain   Flexion 100       Extension 0           Left Knee   Active (deg) Passive (deg) Pain   Flexion 45 70 Yes   Extension -15 -10 Yes                      Hip Strength - Planes of Motion   Right Strength Right Pain Left Strength Left  Pain   Flexion (L2) 4   2+     Extension 2  (seated) 2  (seated)   ABduction 3+   2+     ADduction 3+   2+     Internal Rotation 3+   2+     External Rotation 3+   2+         Knee Strength   Right Strength Right Pain Left Strength Left  Pain   Flexion (S2) 3+   2+     Prone Flexion           Extension (L3) 3+   2+       Knee Extensor Lag  Lag Present: Left  No Lag: Right           Knee Patellar Screening       Right Patellar Mobility  Normal: Medial, Lateral, Superior, and Inferior  Left Patellar Mobility  Hypomobile: Medial, Lateral, Superior, and Inferior           Transfers Assessment  Sit to Stand Assistance: Setup/cleanup  Chair to Bed Assistance: Supervision  Bed to Chair Assistance: Supervision    Bed Mobility Assessment  Rolling Assistance: Setup/cleanup  Sidelying to Sit Assistance: Setup/cleanup  Sit to Sidelying Assistance: Setup/cleanup  Scooting to Edge of Bed Assistance: Setup/cleanup      Sit to Stand Testing  The patient completed 5 sit to stand transfers in 56.24 sec.                Gait Analysis  Base of Support: Wide  Gait Pattern: Antalgic  Walking  Speed: Decreased         Left Side Walking Observations  Decreased: Stance Time, Swing Time, Step Length, and Arm Swing  Left Foot Contact Pattern: Forefoot  Knee Observations During Gait  Left: Decreased Knee Extension in Initial Contact, Knee Decreased Extension in Midstance, and Knee Hyperextension Stance Phase         Treatment:       CPT  Intervention  Performed    Today  Duration / Intensity    MT  Knee Extension Mobilizations  x        Patellar Mobilizations  x      TE  PROM Knee Flexion  x                          NMR  Educated patient on the impairments present and the plan of care to address impairments  x        Educated patient on the proper form and sequencing of all exercises provided in HEP today  x        Discussed signs of DVT and to keep an eye out if these symptoms arise  x      TA                                                                                PLAN  NuStep/recumbent bike   PROM Knee Flexion/Extension   Quad Sets + NMES   Short Arc Quads + NMES   Heel Slides    Ball Squeezes   Sit to Stands                CPT Codes available for Billing:    (08) minutes of Manual therapy (MT) to improve pain and ROM.   (08) minutes of Therapeutic Exercise (TE) to develop strength, endurance, range of motion, and flexibility.   (12) minutes of Neuromuscular Re-Education (NMR)? to improve: Balance, Coordination, Kinesthetic, Sense, Proprioception, and Posture.   (00) minutes of Therapeutic Activities (TA) to improve functional performance.   Vasopneumatic Device Therapy () for management of swelling/edema. (01307)   Unattended Electrical Stimulation (ES) for muscle performance or pain modulation.   BFR: Blood flow restriction applied during exercise        Assessment & Plan   Assessment  Concepcion presents with a condition of Moderate complexity.   Presentation of Symptoms: Stable  Will Comorbidities Impact Care: Yes  See Snapshot for details         Patient Goal for Therapy (PT): Patients goal is to  have more independence and less pain with all functional tasks.  Prognosis: Good  Prognosis Details: Anticipated Barriers for therapy: co-morbidities, sedentary lifestyle, and transportation   Assessment Details: Pt presents with impairments in the following categories: IMPAIRMENTS: ROM, strength, endurance, joint mobility, muscle length, balance, posture, gait mechanics, core strength and stability, functional movement patterns, edema and post-operative precautions. Pt will benefit from skilled outpatient Physical Therapy to address the deficits stated above, provide pt/family education, and to maximize pt's level of independence.      Plan  From a physical therapy perspective, the patient would benefit from: Skilled Rehab Services                Visit Frequency: 2 times Per Week for 6 Weeks.       This plan was discussed with Patient.   Discussion participants: Agreed Upon Plan of Care  Plan details: Outpatient Physical Therapy to include any combination of the following interventions: virtual visits, dry needling, modalities, electrical stimulation (IFC, Pre-Mod, Attended with Functional Dry Needling), Aquatic Therapy, Cervical/Lumbar Traction, Gait Training, Manual Therapy, Neuromuscular Re-ed, Patient Education, Self Care, Therapeutic Exercise, and Therapeutic Activites           Patient's spiritual, cultural, and educational needs considered and patient agreeable to plan of care and goals.     Education  Education was done with Patient. The patient's learning style includes Demonstration. The patient Demonstrates understanding.         PURPOSE: Patient educated on the impairments noted above and the effects of physical therapy intervention to improve overall condition and QOL.  EXERCISE: Patient was educated on all the above exercise prior/during/after for proper posture, positioning, and execution for safe performance with home exercise program.  STRENGTH: Patient educated on the importance of improved core  and extremity strength in order to improve alignment of the spine and extremities with static positions and dynamic movement.  GAIT & BALANCE: Patient educated on the importance of strong core and lower extremity musculature in order to improve both static and dynamic balance, improve gait mechanics, reduce fall risk and improve household and community mobility.  ASSISTIVE DEVICE: Patient educated on proper utilization of assistive device for safe and efficient ambulation and to reduce risk of falls POSTURE: Patient educated on postural awareness to reduce stress and maintain optimal alignment of the spine with static positions and dynamic movement  TRANSFERS & TRANSITIONS: Patient educated on proper technique for bed mobility, transitions and transfers to improve body mechanics and decrease risk of injury.  POST-OP PRECAUTIONS: patient educated on post-operative precautions in order to protect surgical repair, decrease risk of injury and promote healing.  POLICIES: Educated patient on scheduling, cancelation and no-show policy.        Goals:   Active       Long Term Goals       Pt will report worst pain of 2/10 in order to progress toward max functional ability and improve quality of life                Start:  02/19/25    Expected End:  04/02/25            Patient will improve strength to at least 4+/5 grossly,  in order to improve functional independence and quality of life.         Start:  02/19/25    Expected End:  04/02/25            Patient will demonstrate improved function as indicated by a score of greater than or equal to 50 out of 100 on FOTO.                Start:  02/19/25    Expected End:  04/02/25               Short Term Goals       Patient will improve knee extension ROM to 0 and knee flexion to 125 degrees in order to progress towards independence with functional activities.         Start:  02/19/25    Expected End:  03/12/25            Pt will report worst pain of 5/10 in order to progress toward  max functional ability and improve quality of life                Start:  02/19/25    Expected End:  03/12/25            Patient will demonstrate improved function as indicated by a score of greater than or equal to 30 out of 100 on FOTO.                Start:  02/19/25    Expected End:  03/12/25                Pat Miller, PT

## 2025-02-21 ENCOUNTER — CLINICAL SUPPORT (OUTPATIENT)
Dept: REHABILITATION | Facility: HOSPITAL | Age: 70
End: 2025-02-21
Payer: COMMERCIAL

## 2025-02-21 DIAGNOSIS — M25.662 DECREASED RANGE OF MOTION OF LEFT KNEE: Primary | ICD-10-CM

## 2025-02-21 DIAGNOSIS — Z74.09 DECREASED STRENGTH, ENDURANCE, AND MOBILITY: ICD-10-CM

## 2025-02-21 DIAGNOSIS — R53.1 DECREASED STRENGTH, ENDURANCE, AND MOBILITY: ICD-10-CM

## 2025-02-21 DIAGNOSIS — R26.89 FUNCTIONAL GAIT ABNORMALITY: ICD-10-CM

## 2025-02-21 DIAGNOSIS — R68.89 DECREASED STRENGTH, ENDURANCE, AND MOBILITY: ICD-10-CM

## 2025-02-21 PROCEDURE — 97140 MANUAL THERAPY 1/> REGIONS: CPT

## 2025-02-21 PROCEDURE — 97110 THERAPEUTIC EXERCISES: CPT

## 2025-02-21 PROCEDURE — 97016 VASOPNEUMATIC DEVICE THERAPY: CPT

## 2025-02-21 PROCEDURE — 97112 NEUROMUSCULAR REEDUCATION: CPT

## 2025-02-21 NOTE — PROGRESS NOTES
Outpatient Rehab    Physical Therapy Visit    Patient Name: Concepcion Don  MRN: [id number]  YOB: 1955  Today's Date: 2/21/2025    Therapy Diagnosis:   Encounter Diagnoses   Name Primary?    Decreased range of motion of left knee Yes    Decreased strength, endurance, and mobility     Functional gait abnormality      Physician: Willie Melendrez MD    Physician Orders: Eval and Treat  Medical Diagnosis: Arthritis of left knee [M17.12]     Visit # / Visits Authorized:  1 / 20 (+1 for evaluation)   Date of Evaluation:  2/18/2025  Insurance Authorization Period: 2/18/2025 to 12/31/2025  Plan of Care Certification:  2/18/2025 to 4/2/2025      Time In: 1035   Time Out: 1145  Total Time: 70   Total Billable Time: 68       Subjective   Patient reports that her knee is really hurting today. Reports that she did not work on her exercise since the initial evaluation..  Pain reported as 8/10.      Objective            Treatment:       CPT  Intervention  Performed    Today  Duration / Intensity    MT  Knee Extension Mobilizations  x        Patellar Mobilizations  x      TE  PROM Knee Flexion  x        Knee Extension Stretch  x  4 minutes      NuStep  x  8 minutes               NMR  Quad Sets + NMES  x  5 minutes       Short Arc Quads + NMES and strap  x  5 minutes      Heel Slides Supine  x  5 minutes       Heel Slides Sitting with Strap  x  5 minutes    TA                                                                        GameReady + Setup  x  11 minutes low pressure, 48 degrees    PLAN  NuStep/recumbent bike   PROM Knee Flexion/Extension   Quad Sets + NMES   Short Arc Quads + NMES   Heel Slides    Ball Squeezes   Sit to Stands                CPT Codes available for Billing:    (13) minutes of Manual therapy (MT) to improve pain and ROM.   (15) minutes of Therapeutic Exercise (TE) to develop strength, endurance, range of motion, and flexibility.   (25) minutes of Neuromuscular Re-Education (NMR)? to improve:  Balance, Coordination, Kinesthetic, Sense, Proprioception, and Posture. Including set up   (00) minutes of Therapeutic Activities (TA) to improve functional performance.   (15) Vasopneumatic Device Therapy () for management of swelling/edema. (07976)   Unattended Electrical Stimulation (ES) for muscle performance or pain modulation.   BFR: Blood flow restriction applied during exercise         Assessment & Plan   Assessment: Patient noted with difficulty obtaining knee flexion and extension today so spent extended time on this. Was able to reach 78 degrees of flexion and lacking 5 degrees of extension. Encouraged patient to be more diligent with HEP exercises and reiterated how important they are for success with therapy. Even with NMES on, quadriceps muscle had poor firing. Will continue to work on improving this in the coming visits.  Evaluation/Treatment Tolerance: Patient limited by pain    Patient will continue to benefit from skilled outpatient physical therapy to address the deficits listed in the problem list box on initial evaluation, provide pt/family education and to maximize pt's level of independence in the home and community environment.     Patient's spiritual, cultural, and educational needs considered and patient agreeable to plan of care and goals.           Plan: Continue POC and frequency as planned.    Goals:   Active       Long Term Goals       Pt will report worst pain of 2/10 in order to progress toward max functional ability and improve quality of life                Start:  02/19/25    Expected End:  04/02/25            Patient will improve strength to at least 4+/5 grossly,  in order to improve functional independence and quality of life.         Start:  02/19/25    Expected End:  04/02/25            Patient will demonstrate improved function as indicated by a score of greater than or equal to 50 out of 100 on FOTO.                Start:  02/19/25    Expected End:  04/02/25                Short Term Goals       Patient will improve knee extension ROM to 0 and knee flexion to 125 degrees in order to progress towards independence with functional activities.         Start:  02/19/25    Expected End:  03/12/25            Pt will report worst pain of 5/10 in order to progress toward max functional ability and improve quality of life                Start:  02/19/25    Expected End:  03/12/25            Patient will demonstrate improved function as indicated by a score of greater than or equal to 30 out of 100 on FOTO.                Start:  02/19/25    Expected End:  03/12/25                Pat Miller, PT

## 2025-02-25 ENCOUNTER — CLINICAL SUPPORT (OUTPATIENT)
Dept: REHABILITATION | Facility: HOSPITAL | Age: 70
End: 2025-02-25
Payer: COMMERCIAL

## 2025-02-25 DIAGNOSIS — R68.89 DECREASED STRENGTH, ENDURANCE, AND MOBILITY: ICD-10-CM

## 2025-02-25 DIAGNOSIS — Z74.09 DECREASED STRENGTH, ENDURANCE, AND MOBILITY: ICD-10-CM

## 2025-02-25 DIAGNOSIS — M25.662 DECREASED RANGE OF MOTION OF LEFT KNEE: Primary | ICD-10-CM

## 2025-02-25 DIAGNOSIS — R26.89 FUNCTIONAL GAIT ABNORMALITY: ICD-10-CM

## 2025-02-25 DIAGNOSIS — R53.1 DECREASED STRENGTH, ENDURANCE, AND MOBILITY: ICD-10-CM

## 2025-02-25 PROCEDURE — 97140 MANUAL THERAPY 1/> REGIONS: CPT

## 2025-02-25 PROCEDURE — 97112 NEUROMUSCULAR REEDUCATION: CPT

## 2025-02-25 PROCEDURE — 97110 THERAPEUTIC EXERCISES: CPT

## 2025-02-25 PROCEDURE — 97016 VASOPNEUMATIC DEVICE THERAPY: CPT

## 2025-02-25 NOTE — PROGRESS NOTES
Outpatient Rehab    Physical Therapy Visit    Patient Name: Concepcion Don  MRN: [id number]  YOB: 1955  Today's Date: 2/25/2025    Therapy Diagnosis:   Encounter Diagnoses   Name Primary?    Decreased range of motion of left knee Yes    Decreased strength, endurance, and mobility     Functional gait abnormality      Physician: Willie Melendrez MD    Physician Orders: Eval and Treat  Medical Diagnosis: Arthritis of left knee [M17.12]     Visit # / Visits Authorized:  2 / 20 (+1 for evaluation)   Date of Evaluation:  2/18/2025  Insurance Authorization Period: 2/18/2025 to 12/31/2025  Plan of Care Certification:  2/18/2025 to 4/2/2025      Time In: 0928   Time Out: 1038  Total Time: 70   Total Billable Time: 70       Subjective   Patient reports that she is feeling stiffness in her knee with the weather today. Has been working on her exercises some since the last visit..  Pain reported as 6/10. left knee    Objective            Treatment:       CPT  Intervention  Performed    Today  Duration / Intensity    MT  Knee Extension Mobilizations  x        Patellar Mobilizations  x        Soft Tissue Mobilizations: Edema Massage  x      TE  PROM Knee Flexion  x        Knee Extension Stretch  x  5minutes      NuStep  x  8 minutes               NMR  Quad Sets + NMES  x  10 minutes       Short Arc Quads + NMES and strap  x  5 minutes      Heel Slides Supine    5 minutes       Heel Slides Sitting with Strap  x  5 minutes      Glute Sets  x  3x10 reps     TA                                                                        GameReady + Setup  x  10 minutes low pressure, 48 degrees    PLAN  NuStep/recumbent bike   PROM Knee Flexion/Extension   Quad Sets + NMES   Short Arc Quads + NMES   Heel Slides    Ball Squeezes   Sit to Stands                CPT Codes available for Billing:    (10) minutes of Manual therapy (MT) to improve pain and ROM.   (15) minutes of Therapeutic Exercise (TE) to develop strength,  endurance, range of motion, and flexibility.   (33) minutes of Neuromuscular Re-Education (NMR)? to improve: Balance, Coordination, Kinesthetic, Sense, Proprioception, and Posture. Including set up   (00) minutes of Therapeutic Activities (TA) to improve functional performance.   (12) Vasopneumatic Device Therapy () for management of swelling/edema. (11845) Including set up   Unattended Electrical Stimulation (ES) for muscle performance or pain modulation.   BFR: Blood flow restriction applied during exercise      Assessment & Plan   Assessment: Reiterated importance of maintaining compliance with HEP on a daily basis to decrease difficulty obtaining full knee extension and improving ability to obtain greater ranges of flexion. Was able to reach 80 degrees of knee flexion today. Patient tolerated GameReady better today with improvement of pain when weightbearing following and less swelling noted. Quadriceps engagement is still minimal with NMES maxed out, although better recruitment was noted today as compared to last session.  Evaluation/Treatment Tolerance: Patient tolerated treatment well    Patient will continue to benefit from skilled outpatient physical therapy to address the deficits listed in the problem list box on initial evaluation, provide pt/family education and to maximize pt's level of independence in the home and community environment.     Patient's spiritual, cultural, and educational needs considered and patient agreeable to plan of care and goals.           Plan: Continue POC and frequency as planned.    Goals:   Active       Long Term Goals       Pt will report worst pain of 2/10 in order to progress toward max functional ability and improve quality of life                Start:  02/19/25    Expected End:  04/02/25            Patient will improve strength to at least 4+/5 grossly,  in order to improve functional independence and quality of life.         Start:  02/19/25    Expected End:   04/02/25            Patient will demonstrate improved function as indicated by a score of greater than or equal to 50 out of 100 on FOTO.                Start:  02/19/25    Expected End:  04/02/25               Short Term Goals       Patient will improve knee extension ROM to 0 and knee flexion to 125 degrees in order to progress towards independence with functional activities.         Start:  02/19/25    Expected End:  03/12/25            Pt will report worst pain of 5/10 in order to progress toward max functional ability and improve quality of life                Start:  02/19/25    Expected End:  03/12/25            Patient will demonstrate improved function as indicated by a score of greater than or equal to 30 out of 100 on FOTO.                Start:  02/19/25    Expected End:  03/12/25                Pat Miller, PT

## 2025-02-28 ENCOUNTER — CLINICAL SUPPORT (OUTPATIENT)
Dept: REHABILITATION | Facility: HOSPITAL | Age: 70
End: 2025-02-28
Payer: COMMERCIAL

## 2025-02-28 DIAGNOSIS — M25.662 DECREASED RANGE OF MOTION OF LEFT KNEE: Primary | ICD-10-CM

## 2025-02-28 DIAGNOSIS — Z74.09 DECREASED STRENGTH, ENDURANCE, AND MOBILITY: ICD-10-CM

## 2025-02-28 DIAGNOSIS — R53.1 DECREASED STRENGTH, ENDURANCE, AND MOBILITY: ICD-10-CM

## 2025-02-28 DIAGNOSIS — R68.89 DECREASED STRENGTH, ENDURANCE, AND MOBILITY: ICD-10-CM

## 2025-02-28 DIAGNOSIS — R26.89 FUNCTIONAL GAIT ABNORMALITY: ICD-10-CM

## 2025-03-01 NOTE — PROGRESS NOTES
Outpatient Rehab    Physical Therapy Visit    Patient Name: Concepcion Don  MRN: [id number]  YOB: 1955  Today's Date: 2/28/2025    Therapy Diagnosis:   Encounter Diagnoses   Name Primary?    Decreased range of motion of left knee Yes    Decreased strength, endurance, and mobility     Functional gait abnormality      Physician: Willie Melendrez MD    Physician Orders: Eval and Treat  Medical Diagnosis: Arthritis of left knee [M17.12]     Visit # / Visits Authorized:  2 / 20 (+1 for evaluation)   Date of Evaluation:  2/18/2025  Insurance Authorization Period: 2/18/2025 to 12/31/2025  Plan of Care Certification:  2/18/2025 to 4/2/2025      Time In: 0928   Time Out: 1040  Total Time: 72   Total Billable Time: 72       Subjective   Patient reports that she is feeling a little better although continues to have stiffness in her left knee..  Pain reported as 6/10. left knee    Objective            Treatment:       CPT  Intervention  Performed    Today  Duration / Intensity    MT  Knee Extension Mobilizations  x        Patellar Mobilizations  x        Soft Tissue Mobilizations: Edema Massage  x      TE  PROM Knee Flexion  x  Obtained 95 degrees today       Knee Extension Stretch  x  5 minutes      NuStep  x  10 minutes               NMR  Quad Sets + NMES  x  7 minutes       Short Arc Quads + NMES and strap  x  5 minutes      Long Arc Quads + NMES and strap   x  5 minutes       Heel Slides Supine    5 minutes       Heel Slides Sitting with Strap  x  5 minutes      Glute Sets  x  3x10 reps     TA   Sit to Stands x 2x10 reps                                                                  GameReady + Setup  x  10 minutes low pressure, 48 degrees    PLAN  NuStep/recumbent bike   PROM Knee Flexion/Extension   Quad Sets + NMES   Short Arc Quads + NMES   Heel Slides    Ball Squeezes   Sit to Stands                CPT Codes available for Billing:    (10) minutes of Manual therapy (MT) to improve pain and ROM.   (20)  minutes of Therapeutic Exercise (TE) to develop strength, endurance, range of motion, and flexibility.   (30) minutes of Neuromuscular Re-Education (NMR)? to improve: Balance, Coordination, Kinesthetic, Sense, Proprioception, and Posture. Including set up   (00) minutes of Therapeutic Activities (TA) to improve functional performance.   (12) Vasopneumatic Device Therapy () for management of swelling/edema. (83090) Including set up   Unattended Electrical Stimulation (ES) for muscle performance or pain modulation.   BFR: Blood flow restriction applied during exercise      Assessment & Plan   Assessment: Patient with better overall tolerance to mobility today. Improvements in both knee flexion to 95 degrees and knee extension although still lacking a few degrees. Patient was better quadriceps recruitment although still requiring use of NMES to facilitate and improve this. Will continue to progress as able.  Evaluation/Treatment Tolerance: Patient tolerated treatment well    Patient will continue to benefit from skilled outpatient physical therapy to address the deficits listed in the problem list box on initial evaluation, provide pt/family education and to maximize pt's level of independence in the home and community environment.     Patient's spiritual, cultural, and educational needs considered and patient agreeable to plan of care and goals.           Plan:      Goals:   Active       Long Term Goals       Pt will report worst pain of 2/10 in order to progress toward max functional ability and improve quality of life                Start:  02/19/25    Expected End:  04/02/25            Patient will improve strength to at least 4+/5 grossly,  in order to improve functional independence and quality of life.         Start:  02/19/25    Expected End:  04/02/25            Patient will demonstrate improved function as indicated by a score of greater than or equal to 50 out of 100 on FOTO.                Start:   02/19/25    Expected End:  04/02/25               Short Term Goals       Patient will improve knee extension ROM to 0 and knee flexion to 125 degrees in order to progress towards independence with functional activities.         Start:  02/19/25    Expected End:  03/12/25            Pt will report worst pain of 5/10 in order to progress toward max functional ability and improve quality of life                Start:  02/19/25    Expected End:  03/12/25            Patient will demonstrate improved function as indicated by a score of greater than or equal to 30 out of 100 on FOTO.                Start:  02/19/25    Expected End:  03/12/25                Pat Miller, PT

## 2025-03-03 ENCOUNTER — CLINICAL SUPPORT (OUTPATIENT)
Dept: REHABILITATION | Facility: HOSPITAL | Age: 70
End: 2025-03-03
Payer: COMMERCIAL

## 2025-03-03 DIAGNOSIS — R68.89 DECREASED STRENGTH, ENDURANCE, AND MOBILITY: ICD-10-CM

## 2025-03-03 DIAGNOSIS — R53.1 DECREASED STRENGTH, ENDURANCE, AND MOBILITY: ICD-10-CM

## 2025-03-03 DIAGNOSIS — Z74.09 DECREASED STRENGTH, ENDURANCE, AND MOBILITY: ICD-10-CM

## 2025-03-03 DIAGNOSIS — R26.89 FUNCTIONAL GAIT ABNORMALITY: ICD-10-CM

## 2025-03-03 DIAGNOSIS — M25.662 DECREASED RANGE OF MOTION OF LEFT KNEE: Primary | ICD-10-CM

## 2025-03-03 PROCEDURE — 97112 NEUROMUSCULAR REEDUCATION: CPT

## 2025-03-03 PROCEDURE — 97110 THERAPEUTIC EXERCISES: CPT

## 2025-03-03 PROCEDURE — 97140 MANUAL THERAPY 1/> REGIONS: CPT

## 2025-03-03 PROCEDURE — 97016 VASOPNEUMATIC DEVICE THERAPY: CPT

## 2025-03-05 NOTE — PROGRESS NOTES
Outpatient Rehab    Physical Therapy Visit    Patient Name: Concepcion Don  MRN: [id number]  YOB: 1955  Today's Date: 3/5/2025    Therapy Diagnosis:   Encounter Diagnoses   Name Primary?    Decreased range of motion of left knee Yes    Decreased strength, endurance, and mobility     Functional gait abnormality      Physician: Willie Melendrez MD    Physician Orders: Eval and Treat  Medical Diagnosis: Arthritis of left knee [M17.12]     Visit # / Visits Authorized:  4 / 20 (+1 for evaluation)   Date of Evaluation:  2/18/2025  Insurance Authorization Period: 2/18/2025 to 12/31/2025  Plan of Care Certification:  2/18/2025 to 4/2/2025      Time In: 1105   Time Out: 1215  Total Time: 70   Total Billable Time: 70       Subjective   Patient reports that she is feeling achy today. Patient reports that she has not really been working on improving her knee flexion or extension motion at the house..  Pain reported as 5/10. left knee    Objective            Treatment:       CPT  Intervention  Performed    Today  Duration / Intensity    MT  Knee Extension and Flexion Mobilizations  x        Patellar Mobilizations  x        Soft Tissue Mobilizations: Edema Massage  x      TE  PROM Knee Flexion  x  Obtained 98 degrees today      Knee Extension Stretch  x  8 minutes      NuStep  x  10 minutes               NMR  Quad Sets + NMES  x  7 minutes       Short Arc Quads + NMES and strap  x  5 minutes      Long Arc Quads + NMES and strap   x  5 minutes       Heel Slides Supine  x  5 minutes       Heel Slides Sitting with Strap  x  5 minutes     Sit to Stands x 2x10 reps      Glute Sets    3x10 reps     TA                                                                        GameReady + Setup  x  10 minutes low pressure, 48 degrees    PLAN  NuStep/recumbent bike   PROM Knee Flexion/Extension   Quad Sets + NMES   Short Arc Quads + NMES   Heel Slides    Ball Squeezes   Sit to Stands                CPT Codes available for  Billing:    (12) minutes of Manual therapy (MT) to improve pain and ROM.   (23) minutes of Therapeutic Exercise (TE) to develop strength, endurance, range of motion, and flexibility.   (25) minutes of Neuromuscular Re-Education (NMR)? to improve: Balance, Coordination, Kinesthetic, Sense, Proprioception, and Posture. Including set up   (00) minutes of Therapeutic Activities (TA) to improve functional performance.   (10) Vasopneumatic Device Therapy () for management of swelling/edema. (78083) Including set up   Unattended Electrical Stimulation (ES) for muscle performance or pain modulation.   BFR: Blood flow restriction applied during exercise        Assessment & Plan   Assessment: Due to patient not working on her motion at home, spent extended time work on this today. Continued to reiterate the importance of this and how she can execute at home and where and to perform often throughout the day on a daily basis until her motion normalizes. Continued to work on improving quadriceps muscle activation. Will continue to progress as tolerated.  Evaluation/Treatment Tolerance: Patient tolerated treatment well    Patient will continue to benefit from skilled outpatient physical therapy to address the deficits listed in the problem list box on initial evaluation, provide pt/family education and to maximize pt's level of independence in the home and community environment.     Patient's spiritual, cultural, and educational needs considered and patient agreeable to plan of care and goals.           Plan: Continue POC and frequency as planned.    Goals:   Active       Long Term Goals       Pt will report worst pain of 2/10 in order to progress toward max functional ability and improve quality of life                Start:  02/19/25    Expected End:  04/02/25            Patient will improve strength to at least 4+/5 grossly,  in order to improve functional independence and quality of life.         Start:  02/19/25     Expected End:  04/02/25            Patient will demonstrate improved function as indicated by a score of greater than or equal to 50 out of 100 on FOTO.                Start:  02/19/25    Expected End:  04/02/25               Short Term Goals       Patient will improve knee extension ROM to 0 and knee flexion to 125 degrees in order to progress towards independence with functional activities.         Start:  02/19/25    Expected End:  03/12/25            Pt will report worst pain of 5/10 in order to progress toward max functional ability and improve quality of life                Start:  02/19/25    Expected End:  03/12/25            Patient will demonstrate improved function as indicated by a score of greater than or equal to 30 out of 100 on FOTO.                Start:  02/19/25    Expected End:  03/12/25                Pat Miller, PT

## 2025-03-07 ENCOUNTER — CLINICAL SUPPORT (OUTPATIENT)
Dept: REHABILITATION | Facility: HOSPITAL | Age: 70
End: 2025-03-07
Payer: COMMERCIAL

## 2025-03-07 DIAGNOSIS — R26.89 FUNCTIONAL GAIT ABNORMALITY: ICD-10-CM

## 2025-03-07 DIAGNOSIS — Z74.09 DECREASED STRENGTH, ENDURANCE, AND MOBILITY: ICD-10-CM

## 2025-03-07 DIAGNOSIS — R53.1 DECREASED STRENGTH, ENDURANCE, AND MOBILITY: ICD-10-CM

## 2025-03-07 DIAGNOSIS — M25.662 DECREASED RANGE OF MOTION OF LEFT KNEE: Primary | ICD-10-CM

## 2025-03-07 DIAGNOSIS — R68.89 DECREASED STRENGTH, ENDURANCE, AND MOBILITY: ICD-10-CM

## 2025-03-07 PROCEDURE — 97110 THERAPEUTIC EXERCISES: CPT

## 2025-03-07 PROCEDURE — 97140 MANUAL THERAPY 1/> REGIONS: CPT

## 2025-03-07 PROCEDURE — 97112 NEUROMUSCULAR REEDUCATION: CPT

## 2025-03-08 NOTE — PROGRESS NOTES
Outpatient Rehab    Physical Therapy Visit    Patient Name: Concepcion Don  MRN: [id number]  YOB: 1955  Today's Date: 3/8/2025    Therapy Diagnosis:   Encounter Diagnoses   Name Primary?    Decreased range of motion of left knee Yes    Decreased strength, endurance, and mobility     Functional gait abnormality      Physician: Wlilie Melendrez MD    Physician Orders: Eval and Treat  Medical Diagnosis: Arthritis of left knee [M17.12]   Visit # / Visits Authorized:  5 / 20 (+1 for evaluation)   Date of Evaluation:  2/18/2025  Insurance Authorization Period: 2/18/2025 to 12/31/2025  Plan of Care Certification:  2/18/2025 to 4/2/2025      Time In: 1205   Time Out: 1309  Total Time: 64   Total Billable Time: 62     Subjective   Patient reports she is progressing moderately well. Continues to have difficulty with knee flexion..  Pain reported as 5/10. left knee    Objective         Treatment:  CPT  Intervention  Performed    Today  Duration / Intensity    MT  Knee Extension and Flexion Mobilizations  x       Patellar Mobilizations  x       Soft Tissue Mobilizations: Edema Massage  x           TE  PROM Knee Flexion  x Obtained 98 degrees today      Knee Extension Stretch  x 5 minutes with 5#     NuStep   10 minutes      Bike x 7 minutes, partial revolutions    Heel Slides Supine   5 minutes      Heel Slides Sitting with Strap   5 minutes             NMR  Quad Sets + NMES  x 5 minutes       Short Arc Quads + NMES and strap   5 minutes      Long Arc Quads + NMES and strap  x 5 minutes       Heel Slides Supine   5 minutes       Heel Slides Sitting with Strap   5 minutes     Sit to Stands x 3x10 reps TRX straps for assist     Glute Sets    3x10 reps     TA                                                                        GameReady + Setup   10 minutes low pressure, 48 degrees    PLAN  NuStep/recumbent bike   PROM Knee Flexion/Extension   Quad Sets + NMES   Short Arc Quads + NMES   Heel Slides    Ball  Squeezes   Sit to Stands          CPT Codes available for Billing:    (10) minutes of Manual therapy (MT) to improve pain and ROM.   (30) minutes of Therapeutic Exercise (TE) to develop strength, endurance, range of motion, and flexibility.   (18) minutes of Neuromuscular Re-Education (NMR)? to improve: Balance, Coordination, Kinesthetic, Sense, Proprioception, and Posture. Including set up   (00) minutes of Therapeutic Activities (TA) to improve functional performance.   (00) Vasopneumatic Device Therapy () for management of swelling/edema. (23910) Including set up   Unattended Electrical Stimulation (ES) for muscle performance or pain modulation.   BFR: Blood flow restriction applied during exercise     Assessment & Plan   Assessment: Patient tolerated session fair secondary to pain and stiffness in knee. Patient session focused on improving knee flexion. Pt educated on importance of at home program at high frequency.     Patient will continue to benefit from skilled outpatient physical therapy to address the deficits listed in the problem list box on initial evaluation, provide pt/family education and to maximize pt's level of independence in the home and community environment.     Patient's spiritual, cultural, and educational needs considered and patient agreeable to plan of care and goals.       Plan: Continue Plan of Care (POC) and progress per patient tolerance. See treatment section for details on planned progressions next session.    Goals:   Active       Long Term Goals       Pt will report worst pain of 2/10 in order to progress toward max functional ability and improve quality of life                Start:  02/19/25    Expected End:  04/02/25            Patient will improve strength to at least 4+/5 grossly,  in order to improve functional independence and quality of life.         Start:  02/19/25    Expected End:  04/02/25            Patient will demonstrate improved function as indicated by a score  of greater than or equal to 50 out of 100 on FOTO.                Start:  02/19/25    Expected End:  04/02/25               Short Term Goals       Patient will improve knee extension ROM to 0 and knee flexion to 125 degrees in order to progress towards independence with functional activities.         Start:  02/19/25    Expected End:  03/12/25            Pt will report worst pain of 5/10 in order to progress toward max functional ability and improve quality of life                Start:  02/19/25    Expected End:  03/12/25            Patient will demonstrate improved function as indicated by a score of greater than or equal to 30 out of 100 on FOTO.                Start:  02/19/25    Expected End:  03/12/25              Jonnathan Riley, PT

## 2025-03-10 ENCOUNTER — CLINICAL SUPPORT (OUTPATIENT)
Dept: REHABILITATION | Facility: HOSPITAL | Age: 70
End: 2025-03-10
Payer: COMMERCIAL

## 2025-03-10 DIAGNOSIS — M25.662 DECREASED RANGE OF MOTION OF LEFT KNEE: Primary | ICD-10-CM

## 2025-03-10 DIAGNOSIS — R53.1 DECREASED STRENGTH, ENDURANCE, AND MOBILITY: ICD-10-CM

## 2025-03-10 DIAGNOSIS — R68.89 DECREASED STRENGTH, ENDURANCE, AND MOBILITY: ICD-10-CM

## 2025-03-10 DIAGNOSIS — R26.89 FUNCTIONAL GAIT ABNORMALITY: ICD-10-CM

## 2025-03-10 DIAGNOSIS — Z74.09 DECREASED STRENGTH, ENDURANCE, AND MOBILITY: ICD-10-CM

## 2025-03-10 PROCEDURE — 97112 NEUROMUSCULAR REEDUCATION: CPT

## 2025-03-10 PROCEDURE — 97140 MANUAL THERAPY 1/> REGIONS: CPT

## 2025-03-10 PROCEDURE — 97110 THERAPEUTIC EXERCISES: CPT

## 2025-03-11 NOTE — PROGRESS NOTES
Outpatient Rehab    Physical Therapy Visit    Patient Name: Concepcion Don  MRN: [id number]  YOB: 1955  Today's Date: 3/11/2025    Therapy Diagnosis:   No diagnosis found.    Physician: Willie Melendrez MD    Physician Orders: Eval and Treat  Medical Diagnosis: Arthritis of left knee [M17.12]   Visit # / Visits Authorized:  6 / 20 (+1 for evaluation)   Date of Evaluation:  2/18/2025  Insurance Authorization Period: 2/18/2025 to 12/31/2025  Plan of Care Certification:  2/18/2025 to 4/2/2025      Time In: 1055   Time Out: 1205  Total Time: 70   Total Billable Time: 70     Subjective   Patient reports that she took a pain pill before coming in today. Has kind of switched to taking Tylenol at night instead of pain medication. Patient reports that she sometimes has to hang her leg out the bed at night because she still cannot stand the comforter touching her leg..  Pain reported as 4/10. left knee    Objective            CPT  Intervention  Performed    Today  Duration / Intensity    MT  Knee Extension and Flexion Mobilizations  x        Patellar Mobilizations  x        Soft Tissue Mobilizations: Edema Massage  x      TE  PROM Knee Flexion  x  Obtained 100 degrees today      Knee Extension Stretch  x  8 minutes 10 lbs       NuStep  x  8 minutes               NMR  Quad Sets + NMES  x  8 minutes       Short Arc Quads + NMES and strap  x  5 minutes      Long Arc Quads + NMES and strap   x  5 minutes       Heel Slides Supine    5 minutes       Heel Slides Sitting with Strap  x  5 minutes      Glute Sets    3x10 reps     TA                                                                        GameReady + Setup    10 minutes low pressure, 48 degrees    PLAN  NuStep/recumbent bike   PROM Knee Flexion/Extension   Quad Sets + NMES   Short Arc Quads + NMES   Heel Slides    Ball Squeezes   Sit to Stands                CPT Codes available for Billing:    (15) minutes of Manual therapy (MT) to improve pain and ROM.    (23) minutes of Therapeutic Exercise (TE) to develop strength, endurance, range of motion, and flexibility.   (32) minutes of Neuromuscular Re-Education (NMR)? to improve: Balance, Coordination, Kinesthetic, Sense, Proprioception, and Posture. Including set up   (00) minutes of Therapeutic Activities (TA) to improve functional performance.   (00) Vasopneumatic Device Therapy () for management of swelling/edema. (37578) Including set up   Unattended Electrical Stimulation (ES) for muscle performance or pain modulation.   BFR: Blood flow restriction applied during exercise     Assessment & Plan   Assessment: Patient continues to have difficulty obtaining greater ranges of knee flexion. Worked on scar mobility at [facility] as this was limiting some of her knee flexion. Discussed how she can be performing this at home. Continued to work on improving quadriceps engagement with NMES. Emphasized the importance of being more consistent with HEP as she is only performing every now and then between therapy sessions and we are having to dedicate so much time on mobility when we could be progressing functional activities.  Evaluation/Treatment Tolerance: Patient tolerated treatment well    Patient will continue to benefit from skilled outpatient physical therapy to address the deficits listed in the problem list box on initial evaluation, provide pt/family education and to maximize pt's level of independence in the home and community environment.     Patient's spiritual, cultural, and educational needs considered and patient agreeable to plan of care and goals.       Plan: Continue Plan of Care (POC) and progress per patient tolerance. See treatment section for details on planned progressions next session.    Goals:   Active       Long Term Goals       Pt will report worst pain of 2/10 in order to progress toward max functional ability and improve quality of life                Start:  02/19/25    Expected End:   04/02/25            Patient will improve strength to at least 4+/5 grossly,  in order to improve functional independence and quality of life.         Start:  02/19/25    Expected End:  04/02/25            Patient will demonstrate improved function as indicated by a score of greater than or equal to 50 out of 100 on FOTO.                Start:  02/19/25    Expected End:  04/02/25               Short Term Goals       Patient will improve knee extension ROM to 0 and knee flexion to 125 degrees in order to progress towards independence with functional activities.         Start:  02/19/25    Expected End:  03/12/25            Pt will report worst pain of 5/10 in order to progress toward max functional ability and improve quality of life                Start:  02/19/25    Expected End:  03/12/25            Patient will demonstrate improved function as indicated by a score of greater than or equal to 30 out of 100 on FOTO.                Start:  02/19/25    Expected End:  03/12/25              Pat Miller, PT

## 2025-03-14 ENCOUNTER — CLINICAL SUPPORT (OUTPATIENT)
Dept: REHABILITATION | Facility: HOSPITAL | Age: 70
End: 2025-03-14
Payer: COMMERCIAL

## 2025-03-14 DIAGNOSIS — R68.89 DECREASED STRENGTH, ENDURANCE, AND MOBILITY: ICD-10-CM

## 2025-03-14 DIAGNOSIS — R53.1 DECREASED STRENGTH, ENDURANCE, AND MOBILITY: ICD-10-CM

## 2025-03-14 DIAGNOSIS — R26.89 FUNCTIONAL GAIT ABNORMALITY: ICD-10-CM

## 2025-03-14 DIAGNOSIS — M25.662 DECREASED RANGE OF MOTION OF LEFT KNEE: Primary | ICD-10-CM

## 2025-03-14 DIAGNOSIS — Z74.09 DECREASED STRENGTH, ENDURANCE, AND MOBILITY: ICD-10-CM

## 2025-03-14 PROCEDURE — 97530 THERAPEUTIC ACTIVITIES: CPT

## 2025-03-14 PROCEDURE — 97140 MANUAL THERAPY 1/> REGIONS: CPT

## 2025-03-14 PROCEDURE — 97016 VASOPNEUMATIC DEVICE THERAPY: CPT

## 2025-03-14 PROCEDURE — 97112 NEUROMUSCULAR REEDUCATION: CPT

## 2025-03-14 PROCEDURE — 97110 THERAPEUTIC EXERCISES: CPT

## 2025-03-17 ENCOUNTER — CLINICAL SUPPORT (OUTPATIENT)
Dept: REHABILITATION | Facility: HOSPITAL | Age: 70
End: 2025-03-17
Payer: COMMERCIAL

## 2025-03-17 DIAGNOSIS — R26.89 FUNCTIONAL GAIT ABNORMALITY: ICD-10-CM

## 2025-03-17 DIAGNOSIS — M25.662 DECREASED RANGE OF MOTION OF LEFT KNEE: Primary | ICD-10-CM

## 2025-03-17 DIAGNOSIS — R68.89 DECREASED STRENGTH, ENDURANCE, AND MOBILITY: ICD-10-CM

## 2025-03-17 DIAGNOSIS — Z74.09 DECREASED STRENGTH, ENDURANCE, AND MOBILITY: ICD-10-CM

## 2025-03-17 DIAGNOSIS — R53.1 DECREASED STRENGTH, ENDURANCE, AND MOBILITY: ICD-10-CM

## 2025-03-17 PROCEDURE — 97140 MANUAL THERAPY 1/> REGIONS: CPT

## 2025-03-17 PROCEDURE — 97530 THERAPEUTIC ACTIVITIES: CPT

## 2025-03-17 PROCEDURE — 97112 NEUROMUSCULAR REEDUCATION: CPT

## 2025-03-17 PROCEDURE — 97016 VASOPNEUMATIC DEVICE THERAPY: CPT

## 2025-03-17 PROCEDURE — 97110 THERAPEUTIC EXERCISES: CPT

## 2025-03-17 NOTE — PROGRESS NOTES
Outpatient Rehab    Physical Therapy Visit    Patient Name: Concepcion Don  MRN: [id number]  YOB: 1955  Today's Date: 3/17/2025    Therapy Diagnosis:   Encounter Diagnoses   Name Primary?    Decreased range of motion of left knee Yes    Decreased strength, endurance, and mobility     Functional gait abnormality        Physician: Willie Melendrez MD    Physician Orders: Eval and Treat  Medical Diagnosis: Arthritis of left knee [M17.12]   Visit # / Visits Authorized:  7 / 20 (+1 for evaluation)   Date of Evaluation:  2/18/2025  Insurance Authorization Period: 2/18/2025 to 12/31/2025  Plan of Care Certification:  2/18/2025 to 4/2/2025      Time In: 1000   Time Out: 1115  Total Time: 75   Total Billable Time: 75     Subjective   Patient only took Tylenol before coming. Is not really feeling it today. Reports that her knee is aching right now. Reports to therapy without rolling walker..  Pain reported as 3/10. left knee    Objective         CPT  Intervention  Performed    Today  Duration / Intensity    MT  Knee Extension and Flexion Mobilizations  x        Patellar Mobilizations  x        Soft Tissue Mobilizations: Edema Massage  x      TE  PROM Knee Flexion  x  Obtained 101 degrees today      Knee Extension Stretch  x  5 minutes 10 lbs       Recumbent Bike  x  8 minutes for more functional knee extension and mobility              NMR  Quad Sets + NMES  x  8 minutes       Short Arc Quads + NMES and strap  x  5 minutes      Long Arc Quads + NMES   x  3x10 reps        Straight Leg Raises + NMES + Strap  x  3 minutes      Heel Slides Supine  x  5 minutes       Heel Slides Sitting with Strap  x  5 minutes      Glute Sets    3x10 reps     TA  Sit to Stands  x  4x10 reps      Step Ups at 1st Step  x  2x10 reps                                                         GameReady + Setup  x  10 minutes low pressure, 48 degrees    PLAN                  CPT Codes available for Billing:    (15) minutes of Manual therapy  (MT) to improve pain and ROM.   (18) minutes of Therapeutic Exercise (TE) to develop strength, endurance, range of motion, and flexibility.   (23) minutes of Neuromuscular Re-Education (NMR)? to improve: Balance, Coordination, Kinesthetic, Sense, Proprioception, and Posture. Including set up   (09) minutes of Therapeutic Activities (TA) to improve functional performance.   (10) Vasopneumatic Device Therapy () for management of swelling/edema. (92471) Including set up   Unattended Electrical Stimulation (ES) for muscle performance or pain modulation.   BFR: Blood flow restriction applied during exercise     Assessment & Plan   Assessment: Patient was able to demonstrate better tolerance to flexion up to 101 degrees today. Patient continues to lack full knee extension although improving. Was able to perform straight leg raises with a strap today which she previously has been unable to do due to weakness.  Evaluation/Treatment Tolerance: Patient tolerated treatment well    Patient will continue to benefit from skilled outpatient physical therapy to address the deficits listed in the problem list box on initial evaluation, provide pt/family education and to maximize pt's level of independence in the home and community environment.     Patient's spiritual, cultural, and educational needs considered and patient agreeable to plan of care and goals.       Plan: Continue Plan of Care (POC) and progress per patient tolerance. See treatment section for details on planned progressions next session.    Goals:   Active       Long Term Goals       Pt will report worst pain of 2/10 in order to progress toward max functional ability and improve quality of life                Start:  02/19/25    Expected End:  04/02/25            Patient will improve strength to at least 4+/5 grossly,  in order to improve functional independence and quality of life.         Start:  02/19/25    Expected End:  04/02/25            Patient will  demonstrate improved function as indicated by a score of greater than or equal to 50 out of 100 on FOTO.                Start:  02/19/25    Expected End:  04/02/25               Short Term Goals       Patient will improve knee extension ROM to 0 and knee flexion to 125 degrees in order to progress towards independence with functional activities.         Start:  02/19/25    Expected End:  03/12/25            Pt will report worst pain of 5/10 in order to progress toward max functional ability and improve quality of life                Start:  02/19/25    Expected End:  03/12/25            Patient will demonstrate improved function as indicated by a score of greater than or equal to 30 out of 100 on FOTO.                Start:  02/19/25    Expected End:  03/12/25              Pat Miller, PT

## 2025-03-17 NOTE — PROGRESS NOTES
Outpatient Rehab    Physical Therapy Visit    Patient Name: Concepcion Don  MRN: [id number]  YOB: 1955  Today's Date: 3/17/2025    Therapy Diagnosis:   Encounter Diagnoses   Name Primary?    Decreased range of motion of left knee Yes    Decreased strength, endurance, and mobility     Functional gait abnormality        Physician: Willie Melendrez MD    Physician Orders: Eval and Treat  Medical Diagnosis: Arthritis of left knee [M17.12]   Visit # / Visits Authorized:  7 / 20 (+1 for evaluation)   Date of Evaluation:  2/18/2025  Insurance Authorization Period: 2/18/2025 to 12/31/2025  Plan of Care Certification:  2/18/2025 to 4/2/2025      Time In: 1230   Time Out: 1350  Total Time: 80   Total Billable Time: 80     Subjective   Patient reports that she took half of a pain pill before coming in today. Is walking with minimal use of rolling walker and when I removed the walker she was able to walk with good stability..  Pain reported as 4/10. left knee    Objective         CPT  Intervention  Performed    Today  Duration / Intensity    MT  Knee Extension and Flexion Mobilizations  x        Patellar Mobilizations  x        Soft Tissue Mobilizations: Edema Massage  x      TE  PROM Knee Flexion  x  Obtained 95 degrees today      Knee Extension Stretch  x  8 minutes 10 lbs       NuStep  x  8 minutes               NMR  Quad Sets + NMES  x  8 minutes       Short Arc Quads + NMES and strap  x  5 minutes      Long Arc Quads    x  3x10 reps        Heel Slides Supine  x  5 minutes       Heel Slides Sitting with Strap  x  5 minutes      Glute Sets    3x10 reps     TA  Sit to Stands  x  3x10 reps      Step Ups at 1st Step  x  3x10 reps                                                         GameReady + Setup  x  10 minutes low pressure, 48 degrees    PLAN                  CPT Codes available for Billing:    (12) minutes of Manual therapy (MT) to improve pain and ROM.   (23) minutes of Therapeutic Exercise (TE) to  develop strength, endurance, range of motion, and flexibility.   (25) minutes of Neuromuscular Re-Education (NMR)? to improve: Balance, Coordination, Kinesthetic, Sense, Proprioception, and Posture. Including set up   (10) minutes of Therapeutic Activities (TA) to improve functional performance.   (10) Vasopneumatic Device Therapy () for management of swelling/edema. (05244) Including set up   Unattended Electrical Stimulation (ES) for muscle performance or pain modulation.   BFR: Blood flow restriction applied during exercise        Assessment & Plan   Assessment: Patient continues to have knee flexion restrictions, only obtaining 95 degrees. Continued to emphasize importance of working on this and extension at home. Improving quadriceps engagement noted. Will continue to progress mobility and strength as able.  Evaluation/Treatment Tolerance: Patient tolerated treatment well    Patient will continue to benefit from skilled outpatient physical therapy to address the deficits listed in the problem list box on initial evaluation, provide pt/family education and to maximize pt's level of independence in the home and community environment.     Patient's spiritual, cultural, and educational needs considered and patient agreeable to plan of care and goals.       Plan:      Goals:   Active       Long Term Goals       Pt will report worst pain of 2/10 in order to progress toward max functional ability and improve quality of life                Start:  02/19/25    Expected End:  04/02/25            Patient will improve strength to at least 4+/5 grossly,  in order to improve functional independence and quality of life.         Start:  02/19/25    Expected End:  04/02/25            Patient will demonstrate improved function as indicated by a score of greater than or equal to 50 out of 100 on FOTO.                Start:  02/19/25    Expected End:  04/02/25               Short Term Goals       Patient will improve knee  extension ROM to 0 and knee flexion to 125 degrees in order to progress towards independence with functional activities.         Start:  02/19/25    Expected End:  03/12/25            Pt will report worst pain of 5/10 in order to progress toward max functional ability and improve quality of life                Start:  02/19/25    Expected End:  03/12/25            Patient will demonstrate improved function as indicated by a score of greater than or equal to 30 out of 100 on FOTO.                Start:  02/19/25    Expected End:  03/12/25              Pat Miller, PT

## 2025-03-21 ENCOUNTER — CLINICAL SUPPORT (OUTPATIENT)
Dept: REHABILITATION | Facility: HOSPITAL | Age: 70
End: 2025-03-21
Payer: COMMERCIAL

## 2025-03-21 DIAGNOSIS — R53.1 DECREASED STRENGTH, ENDURANCE, AND MOBILITY: ICD-10-CM

## 2025-03-21 DIAGNOSIS — M25.662 DECREASED RANGE OF MOTION OF LEFT KNEE: Primary | ICD-10-CM

## 2025-03-21 DIAGNOSIS — R26.89 FUNCTIONAL GAIT ABNORMALITY: ICD-10-CM

## 2025-03-21 DIAGNOSIS — R68.89 DECREASED STRENGTH, ENDURANCE, AND MOBILITY: ICD-10-CM

## 2025-03-21 DIAGNOSIS — Z74.09 DECREASED STRENGTH, ENDURANCE, AND MOBILITY: ICD-10-CM

## 2025-03-21 PROCEDURE — 97140 MANUAL THERAPY 1/> REGIONS: CPT

## 2025-03-21 PROCEDURE — 97112 NEUROMUSCULAR REEDUCATION: CPT

## 2025-03-21 PROCEDURE — 97016 VASOPNEUMATIC DEVICE THERAPY: CPT

## 2025-03-21 PROCEDURE — 97110 THERAPEUTIC EXERCISES: CPT

## 2025-03-21 NOTE — PROGRESS NOTES
Outpatient Rehab    Physical Therapy Visit    Patient Name: Concepcion Don  MRN: [id number]  YOB: 1955  Today's Date: 3/21/2025    Therapy Diagnosis:   Encounter Diagnoses   Name Primary?    Decreased range of motion of left knee Yes    Decreased strength, endurance, and mobility     Functional gait abnormality        Physician: Willie Melendrez MD    Physician Orders: Eval and Treat  Medical Diagnosis: Arthritis of left knee [M17.12]   Visit # / Visits Authorized:  9 / 20 (+1 for evaluation)   Date of Evaluation:  2/18/2025  Insurance Authorization Period: 2/18/2025 to 12/31/2025  Plan of Care Certification:  2/18/2025 to 4/2/2025      Time In: 1130   Time Out: 1245  Total Time: 75   Total Billable Time: 75     Subjective   Patient reports that her knee is feeling okay today. Patient reports that she is still working on her mobility at home..  Pain reported as 3/10. left knee    Objective         CPT  Intervention  Performed    Today  Duration / Intensity    MT  Knee Extension and Flexion Mobilizations  x        Patellar Mobilizations  x        Soft Tissue Mobilizations: Edema Massage  x      TE  PROM Knee Flexion  x  Obtained 101 degrees today      Knee Extension Stretch  x  5 minutes with suction applied to superior incision      Recumbent Bike  x  8 minutes for more functional knee extension and mobility              NMR  Quad Sets     8 minutes       Short Arc Quads + NMES and strap    5 minutes      Long Arc Quads   x  3x10 reps        Straight Leg Raises + NMES + Strap    3 minutes      Heel Slides Supine with strap    5 minutes       Heel Slides Sitting with Strap  x  5 minutes      Glute Sets    3x10 reps       Knee Flexion Stretch with Chair  x  5 minutes       Knee Flexion Stretch with Step  x  5 minutes               TA  Sit to Stands    4x10 reps      Step Ups at 1st Step    2x10 reps                                                         Vasopneumatic Pump + Setup  x  15 minutes low  pressure, 48 degrees    PLAN                  CPT Codes available for Billing:    (15) minutes of Manual therapy (MT) to improve pain and ROM.   (18) minutes of Therapeutic Exercise (TE) to develop strength, endurance, range of motion, and flexibility.   (25) minutes of Neuromuscular Re-Education (NMR)? to improve: Balance, Coordination, Kinesthetic, Sense, Proprioception, and Posture. Including set up   (00) minutes of Therapeutic Activities (TA) to improve functional performance.   (17) Vasopneumatic Device Therapy () for management of swelling/edema. (69490) Including set up   Unattended Electrical Stimulation (ES) for muscle performance or pain modulation.   BFR: Blood flow restriction applied during exercise     Assessment & Plan   Assessment: Patient continues to struggle with knee flexion. Started out with 85 degrees of flexion and was able to reach 101 degrees. Provided patient with a few new stretches to try at home. Being that her knee flexion is not really progressing I advised that her ortho MD might give her some options for further intervention at their follow up on Tuesday.  Evaluation/Treatment Tolerance: Patient tolerated treatment well    Patient will continue to benefit from skilled outpatient physical therapy to address the deficits listed in the problem list box on initial evaluation, provide pt/family education and to maximize pt's level of independence in the home and community environment.     Patient's spiritual, cultural, and educational needs considered and patient agreeable to plan of care and goals.       Plan: Continue Plan of Care (POC) and progress per patient tolerance. See treatment section for details on planned progressions next session.    Goals:   Active       Long Term Goals       Pt will report worst pain of 2/10 in order to progress toward max functional ability and improve quality of life                Start:  02/19/25    Expected End:  04/02/25            Patient will  improve strength to at least 4+/5 grossly,  in order to improve functional independence and quality of life.         Start:  02/19/25    Expected End:  04/02/25            Patient will demonstrate improved function as indicated by a score of greater than or equal to 50 out of 100 on FOTO.                Start:  02/19/25    Expected End:  04/02/25               Short Term Goals       Patient will improve knee extension ROM to 0 and knee flexion to 125 degrees in order to progress towards independence with functional activities.         Start:  02/19/25    Expected End:  03/12/25            Pt will report worst pain of 5/10 in order to progress toward max functional ability and improve quality of life                Start:  02/19/25    Expected End:  03/12/25            Patient will demonstrate improved function as indicated by a score of greater than or equal to 30 out of 100 on FOTO.                Start:  02/19/25    Expected End:  03/12/25              Pat Miller, PT

## 2025-03-25 ENCOUNTER — CLINICAL SUPPORT (OUTPATIENT)
Dept: REHABILITATION | Facility: HOSPITAL | Age: 70
End: 2025-03-25
Payer: COMMERCIAL

## 2025-03-25 DIAGNOSIS — R26.89 FUNCTIONAL GAIT ABNORMALITY: ICD-10-CM

## 2025-03-25 DIAGNOSIS — Z74.09 DECREASED STRENGTH, ENDURANCE, AND MOBILITY: ICD-10-CM

## 2025-03-25 DIAGNOSIS — R68.89 DECREASED STRENGTH, ENDURANCE, AND MOBILITY: ICD-10-CM

## 2025-03-25 DIAGNOSIS — M25.662 DECREASED RANGE OF MOTION OF LEFT KNEE: Primary | ICD-10-CM

## 2025-03-25 DIAGNOSIS — R53.1 DECREASED STRENGTH, ENDURANCE, AND MOBILITY: ICD-10-CM

## 2025-03-25 PROCEDURE — 97530 THERAPEUTIC ACTIVITIES: CPT

## 2025-03-25 PROCEDURE — 97110 THERAPEUTIC EXERCISES: CPT

## 2025-03-25 PROCEDURE — 97140 MANUAL THERAPY 1/> REGIONS: CPT

## 2025-03-25 PROCEDURE — 97112 NEUROMUSCULAR REEDUCATION: CPT

## 2025-03-27 NOTE — PROGRESS NOTES
Outpatient Rehab    Physical Therapy Visit    Patient Name: Concepcion Don  MRN: [id number]  YOB: 1955  Today's Date: 3/27/2025    Therapy Diagnosis:   Encounter Diagnoses   Name Primary?    Decreased range of motion of left knee Yes    Decreased strength, endurance, and mobility     Functional gait abnormality        Physician: Willie Melendrez MD    Physician Orders: Eval and Treat  Medical Diagnosis: Arthritis of left knee [M17.12]   Visit # / Visits Authorized:  9 / 20 (+1 for evaluation)   Date of Evaluation:  2/18/2025  Insurance Authorization Period: 2/18/2025 to 12/31/2025  Plan of Care Certification:  2/18/2025 to 4/2/2025      Time In: 1300   Time Out: 1410  Total Time: 70   Total Billable Time: 70     Subjective   Patient reports that she is feeling okay today. Followed up with MD yesterday and he is good with her progress so far and would like for her to continue with PT..  Pain reported as 3/10. left knee    Objective         CPT  Intervention  Performed    Today  Duration / Intensity    MT  Knee Extension and Flexion Mobilizations  x        Patellar Mobilizations  x        Soft Tissue Mobilizations: Edema Massage  x  Cupping performed to superior aspect of incision    TE  PROM Knee Flexion  x  Obtained 95 degrees today      Knee Extension Stretch  x  5 minutes with suction applied to superior incision      Recumbent Bike  x  8 minutes for more functional knee extension and mobility              NMR  Quad Sets     8 minutes       Short Arc Quads + NMES and strap    5 minutes      Long Arc Quads   x  3x10 reps  performed in unison to assist with adequate contraction      Straight Leg Raises + NMES + Strap    3 minutes      Heel Slides Supine with strap  x  5 minutes       Heel Slides Sitting with Strap    5 minutes      Glute Sets    3x10 reps       Knee Flexion Stretch with Chair  x  5 minutes       Knee Flexion Stretch with Step  x  5 minutes               TA  Sit to Stands    4x10 reps       Step Ups at 1st Step    2x10 reps       Standing Hip Extension and Abduction  x  2x10 reps each B                                              Vasopneumatic Pump + Setup  x  15 minutes low pressure, 48 degrees    PLAN                  CPT Codes available for Billing:    (15) minutes of Manual therapy (MT) to improve pain and ROM.   (18) minutes of Therapeutic Exercise (TE) to develop strength, endurance, range of motion, and flexibility.   (27) minutes of Neuromuscular Re-Education (NMR)? to improve: Balance, Coordination, Kinesthetic, Sense, Proprioception, and Posture. Including set up   (08) minutes of Therapeutic Activities (TA) to improve functional performance.   (00) Vasopneumatic Device Therapy () for management of swelling/edema. (57268) Including set up   Unattended Electrical Stimulation (ES) for muscle performance or pain modulation.   BFR: Blood flow restriction applied during exercise     Assessment & Plan   Assessment: Continued to work heavily on improving knee flexion ROM. Worked on it both passively and AAROM with strap and gravity assistance. Patient still remains limited but is close to contralateral LE in regards to knee flexion ROM. Incorporated a few exercises to work on improving glutes strength. Will continue to progress as able.  Evaluation/Treatment Tolerance: Patient tolerated treatment well    Patient will continue to benefit from skilled outpatient physical therapy to address the deficits listed in the problem list box on initial evaluation, provide pt/family education and to maximize pt's level of independence in the home and community environment.     Patient's spiritual, cultural, and educational needs considered and patient agreeable to plan of care and goals.       Plan: Continue Plan of Care (POC) and progress per patient tolerance. See treatment section for details on planned progressions next session.    Goals:   Active       Long Term Goals       Pt will report worst  pain of 2/10 in order to progress toward max functional ability and improve quality of life                Start:  02/19/25    Expected End:  04/02/25            Patient will improve strength to at least 4+/5 grossly,  in order to improve functional independence and quality of life.         Start:  02/19/25    Expected End:  04/02/25            Patient will demonstrate improved function as indicated by a score of greater than or equal to 50 out of 100 on FOTO.                Start:  02/19/25    Expected End:  04/02/25               Short Term Goals       Patient will improve knee extension ROM to 0 and knee flexion to 125 degrees in order to progress towards independence with functional activities.         Start:  02/19/25    Expected End:  03/12/25            Pt will report worst pain of 5/10 in order to progress toward max functional ability and improve quality of life                Start:  02/19/25    Expected End:  03/12/25            Patient will demonstrate improved function as indicated by a score of greater than or equal to 30 out of 100 on FOTO.                Start:  02/19/25    Expected End:  03/12/25              Pat Miller, PT

## 2025-03-28 ENCOUNTER — CLINICAL SUPPORT (OUTPATIENT)
Dept: REHABILITATION | Facility: HOSPITAL | Age: 70
End: 2025-03-28
Payer: COMMERCIAL

## 2025-03-28 DIAGNOSIS — M25.662 DECREASED RANGE OF MOTION OF LEFT KNEE: Primary | ICD-10-CM

## 2025-03-28 DIAGNOSIS — R53.1 DECREASED STRENGTH, ENDURANCE, AND MOBILITY: ICD-10-CM

## 2025-03-28 DIAGNOSIS — R68.89 DECREASED STRENGTH, ENDURANCE, AND MOBILITY: ICD-10-CM

## 2025-03-28 DIAGNOSIS — Z74.09 DECREASED STRENGTH, ENDURANCE, AND MOBILITY: ICD-10-CM

## 2025-03-28 DIAGNOSIS — R26.89 FUNCTIONAL GAIT ABNORMALITY: ICD-10-CM

## 2025-03-28 PROCEDURE — 97112 NEUROMUSCULAR REEDUCATION: CPT

## 2025-03-28 PROCEDURE — 97110 THERAPEUTIC EXERCISES: CPT

## 2025-03-28 PROCEDURE — 97530 THERAPEUTIC ACTIVITIES: CPT

## 2025-03-28 PROCEDURE — 97140 MANUAL THERAPY 1/> REGIONS: CPT

## 2025-03-28 NOTE — PROGRESS NOTES
Outpatient Rehab    Physical Therapy Visit    Patient Name: Concepcion Don  MRN: [id number]  YOB: 1955  Today's Date: 3/28/2025    Therapy Diagnosis:   Encounter Diagnoses   Name Primary?    Decreased range of motion of left knee Yes    Decreased strength, endurance, and mobility     Functional gait abnormality        Physician: Willie Melendrez MD    Physician Orders: Eval and Treat  Medical Diagnosis: Arthritis of left knee [M17.12]   Visit # / Visits Authorized:  11 / 20 (+1 for evaluation)   Date of Evaluation:  2/18/2025  Insurance Authorization Period: 2/18/2025 to 12/31/2025  Plan of Care Certification:  2/18/2025 to 4/2/2025      Time In: 0830   Time Out: 0932  Total Time: 62   Total Billable Time: 62     Subjective   Patient reports that she is just a little achy today..  Pain reported as 0/10. left knee    Objective         CPT  Intervention  Performed    Today  Duration / Intensity    MT  Knee Extension and Flexion Mobilizations  x        Patellar Mobilizations  x        Soft Tissue Mobilizations:  x  Cupping performed to superior aspect of incision    TE  PROM Knee Flexion  x  Obtained 105 degrees today      Knee Extension Stretch    5 minutes with suction applied to superior incision      Recumbent Bike  x  8 minutes for more functional knee extension and mobility              NMR  Quad Sets     8 minutes       Short Arc Quads + NMES and strap    5 minutes      Long Arc Quads   x  3x10 reps  performed in unison to assist with adequate contraction      Straight Leg Raises + NMES + Strap    3 minutes      Heel Slides Supine with strap  x  5 minutes       Heel Slides Sitting with Strap    5 minutes      Glute Sets    3x10 reps       Knee Flexion Stretch with Chair  x  5 minutes       Knee Flexion Stretch with Step    5 minutes               TA  Sit to Stands  x  3x10 reps      Step Ups at 1st Step  x  3x10 reps left LE      Side Step Ups at 1st Step  x  2x10 reps left LE leading       Standing  Hip Extension and Abduction  x  3x10 reps each B      Single Leg Balance  x  2x30 sec holds with use of B UE support      Heel Raises  x  3x10 reps                           Vasopneumatic Pump + Setup    15 minutes low pressure, 48 degrees    PLAN                  CPT Codes available for Billing:    (10) minutes of Manual therapy (MT) to improve pain and ROM.   (13) minutes of Therapeutic Exercise (TE) to develop strength, endurance, range of motion, and flexibility.   (15) minutes of Neuromuscular Re-Education (NMR)? to improve: Balance, Coordination, Kinesthetic, Sense, Proprioception, and Posture. Including set up   (24) minutes of Therapeutic Activities (TA) to improve functional performance.   (00) Vasopneumatic Device Therapy () for management of swelling/edema. (20934) Including set up   Unattended Electrical Stimulation (ES) for muscle performance or pain modulation.   BFR: Blood flow restriction applied during exercise        Assessment & Plan   Assessment: Was able to obtain 105 degrees of knee flexion following manual therapy interventions and mobility exercises. Incorporated single leg balance and heel raises along with continuing to work on step ups both forward and lateral and sit to stands to improve her ability to perform all functional tasks. Will continue to progress as able.  Evaluation/Treatment Tolerance: Patient tolerated treatment well    Patient will continue to benefit from skilled outpatient physical therapy to address the deficits listed in the problem list box on initial evaluation, provide pt/family education and to maximize pt's level of independence in the home and community environment.     Patient's spiritual, cultural, and educational needs considered and patient agreeable to plan of care and goals.       Plan: Continue Plan of Care (POC) and progress per patient tolerance. See treatment section for details on planned progressions next session.    Goals:   Active       Long Term  Goals       Pt will report worst pain of 2/10 in order to progress toward max functional ability and improve quality of life                Start:  02/19/25    Expected End:  04/02/25            Patient will improve strength to at least 4+/5 grossly,  in order to improve functional independence and quality of life.         Start:  02/19/25    Expected End:  04/02/25            Patient will demonstrate improved function as indicated by a score of greater than or equal to 50 out of 100 on FOTO.                Start:  02/19/25    Expected End:  04/02/25               Short Term Goals       Patient will improve knee extension ROM to 0 and knee flexion to 125 degrees in order to progress towards independence with functional activities.         Start:  02/19/25    Expected End:  03/12/25            Pt will report worst pain of 5/10 in order to progress toward max functional ability and improve quality of life                Start:  02/19/25    Expected End:  03/12/25            Patient will demonstrate improved function as indicated by a score of greater than or equal to 30 out of 100 on FOTO.                Start:  02/19/25    Expected End:  03/12/25              Pat Miller, PT

## 2025-04-02 ENCOUNTER — CLINICAL SUPPORT (OUTPATIENT)
Dept: REHABILITATION | Facility: HOSPITAL | Age: 70
End: 2025-04-02
Payer: COMMERCIAL

## 2025-04-02 DIAGNOSIS — Z74.09 DECREASED STRENGTH, ENDURANCE, AND MOBILITY: ICD-10-CM

## 2025-04-02 DIAGNOSIS — R53.1 DECREASED STRENGTH, ENDURANCE, AND MOBILITY: ICD-10-CM

## 2025-04-02 DIAGNOSIS — R68.89 DECREASED STRENGTH, ENDURANCE, AND MOBILITY: ICD-10-CM

## 2025-04-02 DIAGNOSIS — R26.89 FUNCTIONAL GAIT ABNORMALITY: ICD-10-CM

## 2025-04-02 DIAGNOSIS — M25.662 DECREASED RANGE OF MOTION OF LEFT KNEE: Primary | ICD-10-CM

## 2025-04-02 PROCEDURE — 97110 THERAPEUTIC EXERCISES: CPT

## 2025-04-02 PROCEDURE — 97112 NEUROMUSCULAR REEDUCATION: CPT

## 2025-04-02 PROCEDURE — 97530 THERAPEUTIC ACTIVITIES: CPT

## 2025-04-03 NOTE — PROGRESS NOTES
Outpatient Rehab    Physical Therapy Visit    Patient Name: Concepcion Don  MRN: [id number]  YOB: 1955  Today's Date: 4/3/2025    Therapy Diagnosis:   Encounter Diagnoses   Name Primary?    Decreased range of motion of left knee Yes    Decreased strength, endurance, and mobility     Functional gait abnormality        Physician: Willie Melendrez MD    Physician Orders: Eval and Treat  Medical Diagnosis: Arthritis of left knee [M17.12]   Visit # / Visits Authorized:  12 / 20 (+1 for evaluation)   Date of Evaluation:  2/18/2025  Insurance Authorization Period: 2/18/2025 to 12/31/2025  Plan of Care Certification:  2/18/2025 to 4/2/2025      Time In: 1108   Time Out: 1203  Total Time: 55   Total Billable Time: 55     Subjective   Patient reports that she is not feeling too hot today in regards to her sinuses..  Pain reported as 3/10. left knee    Objective         CPT  Intervention  Performed    Today  Duration / Intensity    MT  Knee Extension and Flexion Mobilizations          Patellar Mobilizations          Soft Tissue Mobilizations:    Cupping performed to superior aspect of incision    TE  PROM Knee Flexion    Obtained 105 degrees today      Knee Extension Stretch    5 minutes with suction applied to superior incision      Recumbent Bike  x  8 minutes for more functional knee extension and mobility              NMR  Quad Sets     8 minutes       Short Arc Quads + NMES and strap    5 minutes      Long Arc Quads   x  3x10 reps  performed in unison to assist with adequate contraction      Straight Leg Raises + NMES + Strap    3 minutes      Heel Slides Supine with strap    5 minutes       Heel Slides Sitting with Strap    5 minutes      Glute Sets    3x10 reps       Knee Flexion Stretch with Chair  x  5 minutes       Knee Flexion Stretch with Step    5 minutes       Standing TKEs  x  Purple cooks cord 3x10 reps     TA  Sit to Stands  x  3x10 reps      Step Ups at 1st Step    3x10 reps left LE      Side  Step Ups at 1st Step    2x10 reps left LE leading       Standing Hip Extension and Abduction  x  3x10 reps each B      Single Leg Balance    2x30 sec holds with use of B UE support      Heel Raises  x  3x10 reps                           Vasopneumatic Pump + Setup    15 minutes low pressure, 48 degrees    PLAN                  CPT Codes available for Billing:    (00) minutes of Manual therapy (MT) to improve pain and ROM.   (08) minutes of Therapeutic Exercise (TE) to develop strength, endurance, range of motion, and flexibility.   (15) minutes of Neuromuscular Re-Education (NMR)? to improve: Balance, Coordination, Kinesthetic, Sense, Proprioception, and Posture. Including set up   (32) minutes of Therapeutic Activities (TA) to improve functional performance.   (00) Vasopneumatic Device Therapy () for management of swelling/edema. (93370) Including set up   Unattended Electrical Stimulation (ES) for muscle performance or pain modulation.   BFR: Blood flow restriction applied during exercise         Assessment & Plan   Assessment: Deferred heavy focus on ROM and focused more on improving functional mobility and strength as she was not feeling the greatest. Will continue to progress ROM next visit as previously planned.  Evaluation/Treatment Tolerance: Patient tolerated treatment well    Patient will continue to benefit from skilled outpatient physical therapy to address the deficits listed in the problem list box on initial evaluation, provide pt/family education and to maximize pt's level of independence in the home and community environment.     Patient's spiritual, cultural, and educational needs considered and patient agreeable to plan of care and goals.       Plan: Continue Plan of Care (POC) and progress per patient tolerance. See treatment section for details on planned progressions next session.    Goals:   Active       Long Term Goals       Pt will report worst pain of 2/10 in order to progress toward  max functional ability and improve quality of life                Start:  02/19/25    Expected End:  04/02/25            Patient will improve strength to at least 4+/5 grossly,  in order to improve functional independence and quality of life.         Start:  02/19/25    Expected End:  04/02/25            Patient will demonstrate improved function as indicated by a score of greater than or equal to 50 out of 100 on FOTO.                Start:  02/19/25    Expected End:  04/02/25               Short Term Goals       Patient will improve knee extension ROM to 0 and knee flexion to 125 degrees in order to progress towards independence with functional activities.         Start:  02/19/25    Expected End:  03/12/25            Pt will report worst pain of 5/10 in order to progress toward max functional ability and improve quality of life                Start:  02/19/25    Expected End:  03/12/25            Patient will demonstrate improved function as indicated by a score of greater than or equal to 30 out of 100 on FOTO.                Start:  02/19/25    Expected End:  03/12/25              Pat Miller, PT

## 2025-04-04 ENCOUNTER — CLINICAL SUPPORT (OUTPATIENT)
Dept: REHABILITATION | Facility: HOSPITAL | Age: 70
End: 2025-04-04
Payer: COMMERCIAL

## 2025-04-04 DIAGNOSIS — M25.662 DECREASED RANGE OF MOTION OF LEFT KNEE: Primary | ICD-10-CM

## 2025-04-04 DIAGNOSIS — R26.89 FUNCTIONAL GAIT ABNORMALITY: ICD-10-CM

## 2025-04-04 DIAGNOSIS — R53.1 DECREASED STRENGTH, ENDURANCE, AND MOBILITY: ICD-10-CM

## 2025-04-04 DIAGNOSIS — R68.89 DECREASED STRENGTH, ENDURANCE, AND MOBILITY: ICD-10-CM

## 2025-04-04 DIAGNOSIS — Z74.09 DECREASED STRENGTH, ENDURANCE, AND MOBILITY: ICD-10-CM

## 2025-04-04 PROCEDURE — 97140 MANUAL THERAPY 1/> REGIONS: CPT

## 2025-04-04 PROCEDURE — 97110 THERAPEUTIC EXERCISES: CPT

## 2025-04-04 PROCEDURE — 97112 NEUROMUSCULAR REEDUCATION: CPT

## 2025-04-04 PROCEDURE — 97530 THERAPEUTIC ACTIVITIES: CPT

## 2025-04-05 NOTE — PROGRESS NOTES
Outpatient Rehab    Physical Therapy Visit    Patient Name: Concepcion Don  MRN: [id number]  YOB: 1955  Today's Date: 4/5/2025    Therapy Diagnosis:   Encounter Diagnoses   Name Primary?    Decreased range of motion of left knee Yes    Decreased strength, endurance, and mobility     Functional gait abnormality        Physician: Willie Melendrez MD    Physician Orders: Eval and Treat  Medical Diagnosis: Arthritis of left knee [M17.12]   Visit # / Visits Authorized:  12 / 20 (+1 for evaluation)   Date of Evaluation:  2/18/2025  Insurance Authorization Period: 2/18/2025 to 12/31/2025  Plan of Care Certification:  2/18/2025 to 4/2/2025      Time In: 0930   Time Out: 1040  Total Time: 70   Total Billable Time: 70     Subjective   Patient reports that she is feeling better today. Is not currently having any pain in her knee..  Pain reported as 0/10. left knee    Objective         Treatment       CPT  Intervention  Performed    Today  Duration / Intensity    MT  Knee Extension and Flexion Mobilizations          Patellar Mobilizations          Soft Tissue Mobilizations:   x Left hamstring, gastrocnemius, adductors     TE  PROM Knee Flexion  x  Performed in prone today and supine       Knee Extension Stretch    5 minutes with suction applied to superior incision      Recumbent Bike  x  8 minutes for more functional knee extension and mobility      Prone Quad Stretch  x  3 minutes      Prone Hip Flexor Stretch with PT assist  x  2x1 minutes    NMR  Quad Sets     8 minutes       Short Arc Quads + NMES and strap    5 minutes      Long Arc Quads   x  3x10 reps  performed in unison to assist with adequate contraction      Straight Leg Raises + NMES + Strap    3 minutes      Heel Slides Supine with strap    5 minutes       Heel Slides Sitting with Strap    5 minutes      Glute Sets    3x10 reps       Knee Flexion Stretch with Chair    5 minutes       Knee Flexion Stretch with Step    5 minutes       Standing TKEs     Purple cooks cord 3x10 reps       Bridges  x  3x10 reps       Side Lying Clams  x  Green theraband 3x10 reps B    TA  Sit to Stands  x  3x10 reps      Step Ups at 1st Step    3x10 reps left LE      Side Step Ups at 1st Step    2x10 reps left LE leading       Standing Hip Extension and Abduction  x  3x10 reps each B      Single Leg Balance    2x30 sec holds with use of B UE support      Heel Raises  x  3x10 reps                           Vasopneumatic Pump + Setup    15 minutes low pressure, 48 degrees    PLAN                  CPT Codes available for Billing:    (08) minutes of Manual therapy (MT) to improve pain and ROM.   (24) minutes of Therapeutic Exercise (TE) to develop strength, endurance, range of motion, and flexibility.   (23) minutes of Neuromuscular Re-Education (NMR)? to improve: Balance, Coordination, Kinesthetic, Sense, Proprioception, and Posture. Including set up   (15) minutes of Therapeutic Activities (TA) to improve functional performance.   (00) Vasopneumatic Device Therapy () for management of swelling/edema. (34727) Including set up   Unattended Electrical Stimulation (ES) for muscle performance or pain modulation.   BFR: Blood flow restriction applied during exercise        Assessment & Plan   Assessment: Focused on knee flexion mobility more today with quad stretch and hip flexor stretch incorporated along with performing PROM knee flexion in the prone position. Incorporated bridges and side lying clams to assist in progressing glutes strengthening. Continued to work on single leg stability with standing hip series and normalizing functional movement patterns. Cueing provided throughout for form and proper muscle contraction.  Evaluation/Treatment Tolerance: Patient tolerated treatment well    Patient will continue to benefit from skilled outpatient physical therapy to address the deficits listed in the problem list box on initial evaluation, provide pt/family education and to maximize  pt's level of independence in the home and community environment.     Patient's spiritual, cultural, and educational needs considered and patient agreeable to plan of care and goals.       Plan: Continue Plan of Care (POC) and progress per patient tolerance. See treatment section for details on planned progressions next session.    Goals:   Active       Long Term Goals       Pt will report worst pain of 2/10 in order to progress toward max functional ability and improve quality of life                Start:  02/19/25    Expected End:  04/02/25            Patient will improve strength to at least 4+/5 grossly,  in order to improve functional independence and quality of life.         Start:  02/19/25    Expected End:  04/02/25            Patient will demonstrate improved function as indicated by a score of greater than or equal to 50 out of 100 on FOTO.                Start:  02/19/25    Expected End:  04/02/25               Short Term Goals       Patient will improve knee extension ROM to 0 and knee flexion to 125 degrees in order to progress towards independence with functional activities.         Start:  02/19/25    Expected End:  03/12/25            Pt will report worst pain of 5/10 in order to progress toward max functional ability and improve quality of life                Start:  02/19/25    Expected End:  03/12/25            Patient will demonstrate improved function as indicated by a score of greater than or equal to 30 out of 100 on FOTO.                Start:  02/19/25    Expected End:  03/12/25              Pat Miller, PT

## 2025-04-07 ENCOUNTER — CLINICAL SUPPORT (OUTPATIENT)
Dept: REHABILITATION | Facility: HOSPITAL | Age: 70
End: 2025-04-07
Payer: COMMERCIAL

## 2025-04-07 DIAGNOSIS — R26.89 FUNCTIONAL GAIT ABNORMALITY: ICD-10-CM

## 2025-04-07 DIAGNOSIS — R68.89 DECREASED STRENGTH, ENDURANCE, AND MOBILITY: ICD-10-CM

## 2025-04-07 DIAGNOSIS — Z74.09 DECREASED STRENGTH, ENDURANCE, AND MOBILITY: ICD-10-CM

## 2025-04-07 DIAGNOSIS — M25.662 DECREASED RANGE OF MOTION OF LEFT KNEE: Primary | ICD-10-CM

## 2025-04-07 DIAGNOSIS — R53.1 DECREASED STRENGTH, ENDURANCE, AND MOBILITY: ICD-10-CM

## 2025-04-07 PROCEDURE — 97140 MANUAL THERAPY 1/> REGIONS: CPT

## 2025-04-07 PROCEDURE — 97112 NEUROMUSCULAR REEDUCATION: CPT

## 2025-04-07 PROCEDURE — 97530 THERAPEUTIC ACTIVITIES: CPT

## 2025-04-07 PROCEDURE — 97110 THERAPEUTIC EXERCISES: CPT

## 2025-04-08 NOTE — PROGRESS NOTES
Outpatient Rehab    Physical Therapy Visit    Patient Name: Concepcion Don  MRN: [id number]  YOB: 1955  Today's Date: 4/8/2025    Therapy Diagnosis:   Encounter Diagnoses   Name Primary?    Decreased range of motion of left knee Yes    Decreased strength, endurance, and mobility     Functional gait abnormality        Physician: Willie Melendrez MD    Physician Orders: Eval and Treat  Medical Diagnosis: Arthritis of left knee [M17.12]   Visit # / Visits Authorized:  14 / 20 (+1 for evaluation)   Date of Evaluation:  2/18/2025  Insurance Authorization Period: 2/18/2025 to 12/31/2025  Plan of Care Certification:  2/18/2025 to 4/2/2025      Time In: 1150   Time Out: 1245  Total Time: 55   Total Billable Time: 55     Subjective   Patient reports that she has some increased swelling at the superior lateral most part of her knee after cleaning her house. Is worried that something is wrong. Patient reports that she felt better after manual therapy interventions last visit..  Pain reported as 2/10. left knee    Objective         Treatment     CPT  Intervention  Performed    Today  Duration / Intensity    MT  Knee Extension and Flexion Mobilizations          Patellar Mobilizations          Soft Tissue Mobilizations: Cupping to superior half of incision   x  Left hamstring, gastrocnemius, adductors    TE  PROM Knee Flexion    Performed in prone today and supine       Knee Extension Stretch    5 minutes with suction applied to superior incision      Recumbent Bike  x  8 minutes for more functional knee extension and mobility      Prone Quad Stretch    3 minutes      Prone Hip Flexor Stretch with PT assist    2x1 minutes    NMR  Quad Sets     8 minutes       Short Arc Quads + NMES and strap    5 minutes      Long Arc Quads   x  5x10 reps  performed in unison to assist with adequate contraction      Straight Leg Raises + NMES + Strap    3 minutes      Heel Slides Supine with strap    5 minutes       Heel Slides  Sitting with Strap    5 minutes      Glute Sets    3x10 reps       Knee Flexion Stretch with Chair  x  5 minutes       Knee Flexion Stretch with Step    5 minutes       Standing TKEs    Purple cooks cord 3x10 reps       Bridges    3x10 reps       Side Lying Clams    Green theraband 3x10 reps B    TA  Sit to Stands  x  3x10 reps      Step Ups at 1st Step    3x10 reps left LE      Side Step Ups at 1st Step    2x10 reps left LE leading       Standing Hip Extension and Abduction  x  3x10 reps each B      Single Leg Balance    2x30 sec holds with use of B UE support      Heel Raises  x  3x10 reps                           Vasopneumatic Pump + Setup    15 minutes low pressure, 48 degrees    PLAN                  CPT Codes available for Billing:    (12) minutes of Manual therapy (MT) to improve pain and ROM.   (08) minutes of Therapeutic Exercise (TE) to develop strength, endurance, range of motion, and flexibility.   (12) minutes of Neuromuscular Re-Education (NMR)? to improve: Balance, Coordination, Kinesthetic, Sense, Proprioception, and Posture. Including set up   (23) minutes of Therapeutic Activities (TA) to improve functional performance.   (00) Vasopneumatic Device Therapy () for management of swelling/edema. (84277) Including set up   Unattended Electrical Stimulation (ES) for muscle performance or pain modulation.   BFR: Blood flow restriction applied during exercise     Assessment & Plan   Assessment: Worked on as much functional strengthening as possible with the time allotted as she had to leave early for another doctors appointment. Will focus more heavily on ROM next visit. Continued to work through soft tissue and scar restrictions present with improvements of pain following.  Evaluation/Treatment Tolerance: Patient tolerated treatment well    Patient will continue to benefit from skilled outpatient physical therapy to address the deficits listed in the problem list box on initial evaluation, provide  pt/family education and to maximize pt's level of independence in the home and community environment.     Patient's spiritual, cultural, and educational needs considered and patient agreeable to plan of care and goals.       Plan: Continue Plan of Care (POC) and progress per patient tolerance. See treatment section for details on planned progressions next session.    Goals:   Active       Long Term Goals       Pt will report worst pain of 2/10 in order to progress toward max functional ability and improve quality of life                Start:  02/19/25    Expected End:  04/02/25            Patient will improve strength to at least 4+/5 grossly,  in order to improve functional independence and quality of life.         Start:  02/19/25    Expected End:  04/02/25            Patient will demonstrate improved function as indicated by a score of greater than or equal to 50 out of 100 on FOTO.                Start:  02/19/25    Expected End:  04/02/25               Short Term Goals       Patient will improve knee extension ROM to 0 and knee flexion to 125 degrees in order to progress towards independence with functional activities.   (Progressing)       Start:  02/19/25    Expected End:  03/12/25            Pt will report worst pain of 5/10 in order to progress toward max functional ability and improve quality of life          (Met)       Start:  02/19/25    Expected End:  03/12/25    Resolved:  04/08/25         Patient will demonstrate improved function as indicated by a score of greater than or equal to 30 out of 100 on FOTO.                Start:  02/19/25    Expected End:  03/12/25              Pat Miller, PT

## 2025-04-11 ENCOUNTER — CLINICAL SUPPORT (OUTPATIENT)
Dept: REHABILITATION | Facility: HOSPITAL | Age: 70
End: 2025-04-11
Payer: COMMERCIAL

## 2025-04-11 DIAGNOSIS — Z74.09 DECREASED STRENGTH, ENDURANCE, AND MOBILITY: ICD-10-CM

## 2025-04-11 DIAGNOSIS — R53.1 DECREASED STRENGTH, ENDURANCE, AND MOBILITY: ICD-10-CM

## 2025-04-11 DIAGNOSIS — R68.89 DECREASED STRENGTH, ENDURANCE, AND MOBILITY: ICD-10-CM

## 2025-04-11 DIAGNOSIS — R26.89 FUNCTIONAL GAIT ABNORMALITY: ICD-10-CM

## 2025-04-11 DIAGNOSIS — M25.662 DECREASED RANGE OF MOTION OF LEFT KNEE: Primary | ICD-10-CM

## 2025-04-11 PROCEDURE — 97530 THERAPEUTIC ACTIVITIES: CPT

## 2025-04-11 PROCEDURE — 97112 NEUROMUSCULAR REEDUCATION: CPT

## 2025-04-11 PROCEDURE — 97110 THERAPEUTIC EXERCISES: CPT

## 2025-04-11 PROCEDURE — 97140 MANUAL THERAPY 1/> REGIONS: CPT

## 2025-04-14 ENCOUNTER — CLINICAL SUPPORT (OUTPATIENT)
Dept: REHABILITATION | Facility: HOSPITAL | Age: 70
End: 2025-04-14
Payer: COMMERCIAL

## 2025-04-14 DIAGNOSIS — M25.662 DECREASED RANGE OF MOTION OF LEFT KNEE: Primary | ICD-10-CM

## 2025-04-14 DIAGNOSIS — R53.1 DECREASED STRENGTH, ENDURANCE, AND MOBILITY: ICD-10-CM

## 2025-04-14 DIAGNOSIS — Z74.09 DECREASED STRENGTH, ENDURANCE, AND MOBILITY: ICD-10-CM

## 2025-04-14 DIAGNOSIS — R26.89 FUNCTIONAL GAIT ABNORMALITY: ICD-10-CM

## 2025-04-14 DIAGNOSIS — R68.89 DECREASED STRENGTH, ENDURANCE, AND MOBILITY: ICD-10-CM

## 2025-04-14 PROCEDURE — 97530 THERAPEUTIC ACTIVITIES: CPT

## 2025-04-14 PROCEDURE — 97112 NEUROMUSCULAR REEDUCATION: CPT

## 2025-04-14 NOTE — PROGRESS NOTES
Outpatient Rehab    Physical Therapy Visit    Patient Name: Concepcion Don  MRN: [id number]  YOB: 1955  Today's Date: 4/14/2025    Therapy Diagnosis:   Encounter Diagnoses   Name Primary?    Decreased range of motion of left knee Yes    Decreased strength, endurance, and mobility     Functional gait abnormality        Physician: Willie Melendrez MD    Physician Orders: Eval and Treat  Medical Diagnosis: Arthritis of left knee [M17.12]   Visit # / Visits Authorized:  14 / 20 (+1 for evaluation)   Date of Evaluation:  2/18/2025  Insurance Authorization Period: 2/18/2025 to 12/31/2025  Plan of Care Certification:  2/18/2025 to 4/2/2025      Time In: 0918   Time Out: 1030  Total Time: 72   Total Billable Time: 72     Subjective   Patient reports that she is not having any pain today. Does feel some stiffness in her knee..  Pain reported as 0/10. left knee    Objective         Treatment   CPT  Intervention  Performed    Today  Duration / Intensity    MT  Knee Extension and Flexion Mobilizations          Patellar Mobilizations          Soft Tissue Mobilizations: to distal quadriceps and scar tissue/mobilizations with instrument assistance   x      TE  PROM Knee Flexion  x  Performed in supine       Knee Extension Stretch  x  5 minutes 10 lbs       Recumbent Bike  x  8 minutes for more functional knee extension and mobility      Prone Quad Stretch    3 minutes      Prone Hip Flexor Stretch with PT assist    2x1 minutes    NMR  Quad Sets     8 minutes       Short Arc Quads + NMES and strap    5 minutes      Long Arc Quads   x  3x10 reps 2 lbs performed in unison      Straight Leg Raises  x  3x10 reps left       Heel Slides Supine with strap    5 minutes       Heel Slides Sitting with Strap    5 minutes      Glute Sets    3x10 reps       Knee Flexion Stretch with Chair    5 minutes       Knee Flexion Stretch with Step    5 minutes       Standing TKEs    Purple cooks cord 3x10 reps       Bridges    3x10 reps        Side Lying Clams    Green theraband 3x10 reps B    TA  Sit to Stands  x  3x10 reps      Step Ups at 1st Step    3x10 reps left LE      Side Step Ups at 1st Step    2x10 reps left LE leading       Standing Hip flexion, Extension and Abduction  x  3x10 reps each B red band      Single Leg Balance    2x30 sec holds with use of B UE support      Heel Raises  x  3x10 reps       Standing Marches  x  3x10 reps                 Vasopneumatic Pump + Setup    15 minutes low pressure, 48 degrees    PLAN                  CPT Codes available for Billing:    (10) minutes of Manual therapy (MT) to improve pain and ROM.   (16) minutes of Therapeutic Exercise (TE) to develop strength, endurance, range of motion, and flexibility.   (08) minutes of Neuromuscular Re-Education NMR)? to improve: Balance, Coordination, Kinesthetic, Sense, Proprioception, and Posture. Including set up   (38) minutes of Therapeutic Activities (TA) to improve functional performance.   (00) Vasopneumatic Device Therapy () for management of swelling/edema. (97037) Including set up   Unattended Electrical Stimulation (ES) for muscle performance or pain modulation.   BFR: Blood flow restriction applied during exercise     Assessment & Plan   Assessment: Continued to work on improving knee flexion and extension mobility with manual therapy and mobility exercises incorporated. Discussed trying to focus equally on mobility and strength to allow for more functional progressions with therapy. Cueing provided throughout to assist with quality of exercises.  Evaluation/Treatment Tolerance: Patient tolerated treatment well    Patient will continue to benefit from skilled outpatient physical therapy to address the deficits listed in the problem list box on initial evaluation, provide pt/family education and to maximize pt's level of independence in the home and community environment.     Patient's spiritual, cultural, and educational needs considered and patient  agreeable to plan of care and goals.       Plan: Continue Plan of Care (POC) and progress per patient tolerance. See treatment section for details on planned progressions next session.    Goals:   Active       Long Term Goals       Pt will report worst pain of 2/10 in order to progress toward max functional ability and improve quality of life                Start:  02/19/25    Expected End:  04/02/25            Patient will improve strength to at least 4+/5 grossly,  in order to improve functional independence and quality of life.         Start:  02/19/25    Expected End:  04/02/25            Patient will demonstrate improved function as indicated by a score of greater than or equal to 50 out of 100 on FOTO.                Start:  02/19/25    Expected End:  04/02/25               Short Term Goals       Patient will improve knee extension ROM to 0 and knee flexion to 125 degrees in order to progress towards independence with functional activities.   (Progressing)       Start:  02/19/25    Expected End:  03/12/25            Pt will report worst pain of 5/10 in order to progress toward max functional ability and improve quality of life          (Met)       Start:  02/19/25    Expected End:  03/12/25    Resolved:  04/08/25         Patient will demonstrate improved function as indicated by a score of greater than or equal to 30 out of 100 on FOTO.                Start:  02/19/25    Expected End:  03/12/25              Pat Miller, PT

## 2025-04-15 NOTE — PROGRESS NOTES
Outpatient Rehab    Physical Therapy Visit    Patient Name: Concepcion Don  MRN: [id number]  YOB: 1955  Today's Date: 4/15/2025    Therapy Diagnosis:   Encounter Diagnoses   Name Primary?    Decreased range of motion of left knee Yes    Decreased strength, endurance, and mobility     Functional gait abnormality        Physician: Willie Melendrez MD    Physician Orders: Eval and Treat  Medical Diagnosis: Arthritis of left knee [M17.12]   Visit # / Visits Authorized:  14 / 20 (+1 for evaluation)   Date of Evaluation:  2/18/2025  Insurance Authorization Period: 2/18/2025 to 12/31/2025  Plan of Care Certification:  2/18/2025 to 4/2/2025      Time In: 1108   Time Out: 1200  Total Time: 52   Total Billable Time: 52     Subjective   Patient reports that she is feeling okay today. No specific complaints..  Pain reported as 0/10. left knee    Objective         CPT  Intervention  Performed    Today  Duration / Intensity    MT  Knee Extension and Flexion Mobilizations          Patellar Mobilizations          Soft Tissue Mobilizations: to distal quadriceps and scar tissue/mobilizations with instrument assistance         TE  PROM Knee Flexion    Performed in supine       Knee Extension Stretch    5 minutes 10 lbs       Recumbent Bike    8 minutes for more functional knee extension and mobility      Prone Quad Stretch    3 minutes      Prone Hip Flexor Stretch with PT assist    2x1 minutes    NMR  Quad Sets     8 minutes       Short Arc Quads + NMES and strap    5 minutes      Long Arc Quads    3x10 reps 2 lbs performed in unison      Straight Leg Raises    3x10 reps left       Heel Slides Supine with strap    5 minutes       Heel Slides Sitting with Strap    5 minutes      Glute Sets    3x10 reps       Knee Flexion Stretch with Chair    5 minutes       Knee Flexion Stretch with Step    5 minutes       Standing TKEs  x  Purple cooks cord 3x10 reps       Bridges    3x10 reps       Side Lying Clams    Green theraband  3x10 reps B      Recumbent Bike  x  8 minutes rocking back and forth     TA  Sit to Stands  x  3x10 reps      Stair Climbing- Step to Step 4 steps  x  5 bouts up and down- cueing for sequencing      Side Step Ups at 1st Step  x  2x10 reps B      Standing Hip flexion, Extension and Abduction  x  3x10 reps each B red band      Single Leg Balance  x  3x30 sec holds with use of B UE support      Heel Raises  x  3x10 reps       Standing Marches  x  3x10 reps                 Vasopneumatic Pump + Setup    15 minutes low pressure, 48 degrees    PLAN                  CPT Codes available for Billing:    (00) minutes of Manual therapy (MT) to improve pain and ROM.   (00) minutes of Therapeutic Exercise (TE) to develop strength, endurance, range of motion, and flexibility.   (12) minutes of Neuromuscular Re-Education NMR)? to improve: Balance, Coordination, Kinesthetic, Sense, Proprioception, and Posture. Including set up   (40) minutes of Therapeutic Activities (TA) to improve functional performance.   (00) Vasopneumatic Device Therapy () for management of swelling/edema. (26921) Including set up   Unattended Electrical Stimulation (ES) for muscle performance or pain modulation.   BFR: Blood flow restriction applied during exercise     Assessment & Plan   Assessment: Began utilizing recumbent bike to assist with improving knee flexion with some improvements following, to allow for more time to work on standing and functional strengthening. Incorporated stair climbing today with cueing for sequencing for step to step gait pattern. Patient with more muscle fatigue and soreness today as expected. Cueing provided throughout for improvement of muscle activation and form.  Evaluation/Treatment Tolerance: Patient tolerated treatment well    Patient will continue to benefit from skilled outpatient physical therapy to address the deficits listed in the problem list box on initial evaluation, provide pt/family education and to  maximize pt's level of independence in the home and community environment.     Patient's spiritual, cultural, and educational needs considered and patient agreeable to plan of care and goals.       Plan: Continue Plan of Care (POC) and progress per patient tolerance. See treatment section for details on planned progressions next session.    Goals:   Active       Long Term Goals       Pt will report worst pain of 2/10 in order to progress toward max functional ability and improve quality of life                Start:  02/19/25    Expected End:  04/02/25            Patient will improve strength to at least 4+/5 grossly,  in order to improve functional independence and quality of life.         Start:  02/19/25    Expected End:  04/02/25            Patient will demonstrate improved function as indicated by a score of greater than or equal to 50 out of 100 on FOTO.                Start:  02/19/25    Expected End:  04/02/25               Short Term Goals       Patient will improve knee extension ROM to 0 and knee flexion to 125 degrees in order to progress towards independence with functional activities.   (Progressing)       Start:  02/19/25    Expected End:  03/12/25            Pt will report worst pain of 5/10 in order to progress toward max functional ability and improve quality of life          (Met)       Start:  02/19/25    Expected End:  03/12/25    Resolved:  04/08/25         Patient will demonstrate improved function as indicated by a score of greater than or equal to 30 out of 100 on FOTO.                Start:  02/19/25    Expected End:  03/12/25              Pat Miller, PT

## 2025-04-17 ENCOUNTER — CLINICAL SUPPORT (OUTPATIENT)
Dept: REHABILITATION | Facility: HOSPITAL | Age: 70
End: 2025-04-17
Payer: COMMERCIAL

## 2025-04-17 DIAGNOSIS — R68.89 DECREASED STRENGTH, ENDURANCE, AND MOBILITY: ICD-10-CM

## 2025-04-17 DIAGNOSIS — M25.662 DECREASED RANGE OF MOTION OF LEFT KNEE: Primary | ICD-10-CM

## 2025-04-17 DIAGNOSIS — R26.89 FUNCTIONAL GAIT ABNORMALITY: ICD-10-CM

## 2025-04-17 DIAGNOSIS — R53.1 DECREASED STRENGTH, ENDURANCE, AND MOBILITY: ICD-10-CM

## 2025-04-17 DIAGNOSIS — Z74.09 DECREASED STRENGTH, ENDURANCE, AND MOBILITY: ICD-10-CM

## 2025-04-17 PROCEDURE — 97110 THERAPEUTIC EXERCISES: CPT

## 2025-04-17 PROCEDURE — 97140 MANUAL THERAPY 1/> REGIONS: CPT

## 2025-04-17 PROCEDURE — 97530 THERAPEUTIC ACTIVITIES: CPT

## 2025-04-17 PROCEDURE — 97112 NEUROMUSCULAR REEDUCATION: CPT

## 2025-04-18 NOTE — PROGRESS NOTES
Outpatient Rehab    Physical Therapy Visit    Patient Name: Concepcion Don  MRN: [id number]  YOB: 1955  Today's Date: 4/18/2025    Therapy Diagnosis:   Encounter Diagnoses   Name Primary?    Decreased range of motion of left knee Yes    Decreased strength, endurance, and mobility     Functional gait abnormality        Physician: Willie Melendrez MD    Physician Orders: Eval and Treat  Medical Diagnosis: Arthritis of left knee [M17.12]   Visit # / Visits Authorized:  17 / 20 (+1 for evaluation)   Date of Evaluation:  2/18/2025  Insurance Authorization Period: 2/18/2025 to 12/31/2025  Plan of Care Certification:  2/18/2025 to 4/2/2025      Time In: 0920   Time Out: 1030  Total Time: 70   Total Billable Time: 70     Subjective   Patient reports that she is not currently in pain but was sore after last visit..  Pain reported as 0/10. left knee    Objective         Treatment:  CPT  Intervention  Performed    Today  Duration / Intensity    MT  Knee Extension and Flexion Mobilizations          Patellar Mobilizations  x        Soft Tissue Mobilizations: to distal quadriceps and scar tissue/mobilizations with instrument assistance   x      TE  PROM Knee Flexion  x  Performed in supine       Knee Extension Stretch  x  5 minutes 10 lbs       Recumbent Bike    8 minutes for more functional knee extension and mobility      Prone Quad Stretch    3 minutes      Prone Hip Flexor Stretch with PT assist    2x1 minutes    NMR  Quad Sets     8 minutes       Short Arc Quads  x  3x10 reps 7 lbs       Long Arc Quads  x  3x10 reps 3 lbs performed in unison      Straight Leg Raises  x  3x10 reps left       Heel Slides Supine with strap    5 minutes       Heel Slides Sitting with Strap    5 minutes      Glute Sets    3x10 reps       Knee Flexion Stretch with Chair    5 minutes       Knee Flexion Stretch with Step    5 minutes       Standing TKEs    Purple cooks cord 3x10 reps       Bridges  x  3x10 reps       Side Lying Clams     Green theraband 3x10 reps B      Recumbent Bike  x  8 minutes rocking back and forth     TA  Sit to Stands    3x10 reps      Stair Climbing- Step to Step 4 steps    5 bouts up and down- cueing for sequencing      Side Step Ups at 1st Step    2x10 reps B      Standing Hip flexion, Extension and Abduction    3x10 reps each B red band      Single Leg Balance  x  3x30 sec holds with use of B UE support      Heel Raises  x  3x10 reps       Standing Marches  x  3x10 reps red band around feet      Side Stepping   x  Red band around ankles 5 laps back and forth length of mat       Vasopneumatic Pump + Setup    15 minutes low pressure, 48 degrees    PLAN                  CPT Codes available for Billing:    (08) minutes of Manual therapy (MT) to improve pain and ROM.   (08) minutes of Therapeutic Exercise (TE) to develop strength, endurance, range of motion, and flexibility.   (26) minutes of Neuromuscular Re-Education NMR)? to improve: Balance, Coordination, Kinesthetic, Sense, Proprioception, and Posture. Including set up   (28) minutes of Therapeutic Activities (TA) to improve functional performance.   (00) Vasopneumatic Device Therapy () for management of swelling/edema. (62129) Including set up   Unattended Electrical Stimulation (ES) for muscle performance or pain modulation.   BFR: Blood flow restriction applied during exercise     Assessment & Plan   Assessment: Continued to work on improving both isolated and functional strength while focusing on improving knee extension and flexion ROM. Cueing provided throughout to decrease compensations and improve form.  Evaluation/Treatment Tolerance: Patient tolerated treatment well    Patient will continue to benefit from skilled outpatient physical therapy to address the deficits listed in the problem list box on initial evaluation, provide pt/family education and to maximize pt's level of independence in the home and community environment.     Patient's spiritual,  cultural, and educational needs considered and patient agreeable to plan of care and goals.       Plan: Continue Plan of Care (POC) and progress per patient tolerance. See treatment section for details on planned progressions next session.    Goals:   Active       Long Term Goals       Pt will report worst pain of 2/10 in order to progress toward max functional ability and improve quality of life                Start:  02/19/25    Expected End:  04/02/25            Patient will improve strength to at least 4+/5 grossly,  in order to improve functional independence and quality of life.         Start:  02/19/25    Expected End:  04/02/25            Patient will demonstrate improved function as indicated by a score of greater than or equal to 50 out of 100 on FOTO.                Start:  02/19/25    Expected End:  04/02/25               Short Term Goals       Patient will improve knee extension ROM to 0 and knee flexion to 125 degrees in order to progress towards independence with functional activities.   (Progressing)       Start:  02/19/25    Expected End:  03/12/25            Pt will report worst pain of 5/10 in order to progress toward max functional ability and improve quality of life          (Met)       Start:  02/19/25    Expected End:  03/12/25    Resolved:  04/08/25         Patient will demonstrate improved function as indicated by a score of greater than or equal to 30 out of 100 on FOTO.                Start:  02/19/25    Expected End:  03/12/25              Pat Miller, PT

## 2025-04-21 ENCOUNTER — CLINICAL SUPPORT (OUTPATIENT)
Dept: REHABILITATION | Facility: HOSPITAL | Age: 70
End: 2025-04-21
Payer: COMMERCIAL

## 2025-04-21 DIAGNOSIS — R26.89 FUNCTIONAL GAIT ABNORMALITY: ICD-10-CM

## 2025-04-21 DIAGNOSIS — R68.89 DECREASED STRENGTH, ENDURANCE, AND MOBILITY: ICD-10-CM

## 2025-04-21 DIAGNOSIS — M25.662 DECREASED RANGE OF MOTION OF LEFT KNEE: Primary | ICD-10-CM

## 2025-04-21 DIAGNOSIS — R53.1 DECREASED STRENGTH, ENDURANCE, AND MOBILITY: ICD-10-CM

## 2025-04-21 DIAGNOSIS — Z74.09 DECREASED STRENGTH, ENDURANCE, AND MOBILITY: ICD-10-CM

## 2025-04-21 PROCEDURE — 97530 THERAPEUTIC ACTIVITIES: CPT

## 2025-04-21 PROCEDURE — 97110 THERAPEUTIC EXERCISES: CPT

## 2025-04-21 NOTE — LETTER
April 21, 2025  Willie Melendrez MD  [id number]  [facility] [id number]    To whom it may concern,     The attached plan of care is being sent to you for review and reference.    You may indicate your approval by signing the document electronically, or by faxing/mailing a signed copy of the final page of this document back to the attention of Pat Miller, PT:         Plan of Care 4/21/25   Effective from: 4/21/2025  Effective to: 6/16/2025    Plan ID: [id number]            Participants as of Finalize on 4/21/2025    Name Type Comments Contact Info    Willie Melendrez MD Referring Provider  [phone]       Last Plan Note     Author: Pat Miller, PT Status: Incomplete Last edited: 4/21/2025 11:00 AM         Outpatient Rehab    Physical Therapy Progress Note : Updated Plan of Care      Physical Therapy Visit    Patient Name: Concepcion Don  MRN: [id number]  YOB: 1955  Today's Date: 4/21/2025    Therapy Diagnosis:   Encounter Diagnoses   Name Primary?    Decreased range of motion of left knee Yes    Decreased strength, endurance, and mobility     Functional gait abnormality        Physician: Willie Melendrez MD    Physician Orders: Eval and Treat  Medical Diagnosis: Arthritis of left knee [M17.12]   Visit # / Visits Authorized:  17 / 20 (+1 for evaluation)   Date of Evaluation:  2/18/2025  Insurance Authorization Period: 2/18/2025 to 12/31/2025  Plan of Care Certification:  4/21/2025 to 6/13/2025     Time In: 1118   Time Out: 1210  Total Time: 52   Total Billable Time: 52     Subjective  Patient reports to therapy late due to traffic. Has been feeling stronger within the last couple of weeks..  Pain reported as 0/10. left knee    Objective        Posture             Left knee position is: Flexed        Knee Observations  Right Knee Observations  Not Present: Straight Leg Raise Extensor Lag  Left Knee Observations  Present: Edema and Straight Leg Raise Extensor Lag         Knee Range of Motion   Right Knee    Active (deg) Passive (deg) Pain   Flexion 100       Extension 0             Left Knee    Active (deg) Passive (deg) Pain   Flexion 85 100 Yes   Extension -5 0 Yes         Hip Strength - Planes of Motion    Right Strength Right Pain Left Strength Left  Pain   Flexion (L2) 4+   4-     Extension 3+  (seated) 3-  (seated)   ABduction 3+   4-     ADduction 3+   3+     Internal Rotation 3+   3+     External Rotation 3+   3+           Knee Strength    Right Strength Right Pain Left Strength Left  Pain   Flexion (S2) 4   4-     Prone Flexion           Extension (L3) 4-   3+        Knee Extensor Lag  Lag Present: Left  No Lag: Right     Knee Patellar Screening        Right Patellar Mobility  Normal: Medial, Lateral, Superior, and Inferior  Left Patellar Mobility  Normal: Medial, Lateral, Superior, and Inferior      Tibial Mobility: hypomobile        Transfers Assessment: Independent     Bed Mobility Assessment: Independent        Sit to Stand Testing  The patient completed 5 sit to stand transfers in 17 sec.             Gait Analysis  Base of Support: Wide  Walking Speed: Normal       Treatment:    CPT  Intervention  Performed    Today  Duration / Intensity    MT  Knee Extension and Flexion Mobilizations          Patellar Mobilizations          Soft Tissue Mobilizations: to distal quadriceps and scar tissue/mobilizations with instrument assistance         TE  PROM Knee Flexion    Performed in supine       Knee Extension Stretch    5 minutes 10 lbs       Recumbent Bike    8 minutes for more functional knee extension and mobility      Prone Quad Stretch    3 minutes      Prone Hip Flexor Stretch with PT assist    2x1 minutes      Re-Assessed Objective Measurements  x        Recumbent Bike  x  6 minutes rocking back and forth     NMR  Quad Sets     8 minutes       Short Arc Quads    3x10 reps 7 lbs       Long Arc Quads    3x10 reps 3 lbs performed in unison      Straight Leg Raises    3x10  reps left       Heel Slides Supine with strap    5 minutes       Heel Slides Sitting with Strap    5 minutes      Glute Sets    3x10 reps       Knee Flexion Stretch with Chair    5 minutes       Knee Flexion Stretch with Step    5 minutes       Standing TKEs    Purple cooks cord 3x10 reps       Bridges    3x10 reps       Side Lying Clams    Green theraband 3x10 reps B              TA  Sit to Stands  x  3x10 reps 10 lbs       Stair Climbing- Step Over  Step 4 steps  x  4 bouts up and down- cueing for sequencing      Side Step Ups at 1st Step  x  2x10 reps B      Standing Hip flexion, Extension and Abduction  x  3x10 reps each B red band      Single Leg Balance  x  3x30 sec holds with use of B UE support      Heel Raises  x  3x10 reps       Standing Marches  x  3x10 reps red band around feet      Side Stepping     Red band around ankles 5 laps back and forth length of mat       Vasopneumatic Pump + Setup    15 minutes low pressure, 48 degrees    PLAN                  CPT Codes available for Billing:    (00) minutes of Manual therapy (MT) to improve pain and ROM.   (12) minutes of Therapeutic Exercise (TE) to develop strength, endurance, range of motion, and flexibility.   (00) minutes of Neuromuscular Re-Education NMR)? to improve: Balance, Coordination, Kinesthetic, Sense, Proprioception, and Posture. Including set up   (40) minutes of Therapeutic Activities (TA) to improve functional performance.   (00) Vasopneumatic Device Therapy () for management of swelling/edema. (58852) Including set up   Unattended Electrical Stimulation (ES) for muscle performance or pain modulation.   BFR: Blood flow restriction applied during exercise     Assessment & Plan  Assessment: Continued to work on functional strengthening. Progressed to step over step gait pattern on stairs today. Patient is progressing well with improving strength in the last month, right LE is actually lagging in certain muscle groups. Emphasized the  importance of keeping both LEs strong. Patient will continue to benefit from skilled Physical Therapy to address remaining ROM and strength deficits to allow for more functional independence.  Evaluation/Treatment Tolerance: Patient tolerated treatment well    Patient will continue to benefit from skilled outpatient physical therapy to address the deficits listed in the problem list box on initial evaluation, provide pt/family education and to maximize pt's level of independence in the home and community environment.     Patient's spiritual, cultural, and educational needs considered and patient agreeable to plan of care and goals.       Plan: Continue Plan of Care (POC) and progress per patient tolerance. See treatment section for details on planned progressions next session.    Goals:   Active       Long Term Goals       Pt will report worst pain of 2/10 in order to progress toward max functional ability and improve quality of life                Start:  02/19/25    Expected End:  04/02/25            Patient will improve strength to at least 4+/5 grossly,  in order to improve functional independence and quality of life.         Start:  02/19/25    Expected End:  04/02/25            Patient will demonstrate improved function as indicated by a score of greater than or equal to 50 out of 100 on FOTO.                Start:  02/19/25    Expected End:  04/02/25               Short Term Goals       Patient will improve knee extension ROM to 0 and knee flexion to 125 degrees in order to progress towards independence with functional activities.   (Progressing)       Start:  02/19/25    Expected End:  03/12/25            Pt will report worst pain of 5/10 in order to progress toward max functional ability and improve quality of life          (Met)       Start:  02/19/25    Expected End:  03/12/25    Resolved:  04/08/25         Patient will demonstrate improved function as indicated by a score of greater than or equal to 30  out of 100 on FOTO.                Start:  02/19/25    Expected End:  03/12/25              Pat Miller PT                                   Current Participants as of 4/21/2025    Name Type Comments Contact Info    Willie Melendrez MD Referring Provider  [phone]    Signature pending            Sincerely,      Pat Miller, JAIR  [facility]                                                            Dear Pat Miller, PT,    RE: Ms. Concepcion Don, MRN: [id number]    I certify that I have reviewed the attached plan of care and agree to the details within.        ___________________________  ___________________________  Provider Printed Name   Provider Signed Name      ___________________________  Date and Time

## 2025-04-21 NOTE — PROGRESS NOTES
Outpatient Rehab    Physical Therapy Progress Note : Updated Plan of Care      Physical Therapy Visit    Patient Name: Concepcion Don  MRN: [id number]  YOB: 1955  Today's Date: 4/21/2025    Therapy Diagnosis:   Encounter Diagnoses   Name Primary?    Decreased range of motion of left knee Yes    Decreased strength, endurance, and mobility     Functional gait abnormality        Physician: Willie Melendrez MD    Physician Orders: Eval and Treat  Medical Diagnosis: Arthritis of left knee [M17.12]   Visit # / Visits Authorized:  17 / 20 (+1 for evaluation)   Date of Evaluation:  2/18/2025  Insurance Authorization Period: 2/18/2025 to 12/31/2025  Plan of Care Certification:  4/21/2025 to 6/16/2025     Time In: 1118   Time Out: 1210  Total Time: 52   Total Billable Time: 52     Subjective   Patient reports to therapy late due to traffic. Has been feeling stronger within the last couple of weeks..  Pain reported as 0/10. left knee    Objective         Posture             Left knee position is: Flexed        Knee Observations  Right Knee Observations  Not Present: Straight Leg Raise Extensor Lag  Left Knee Observations  Present: Edema and Straight Leg Raise Extensor Lag        Knee Range of Motion   Right Knee    Active (deg) Passive (deg) Pain   Flexion 100       Extension 0             Left Knee    Active (deg) Passive (deg) Pain   Flexion 85 100 Yes   Extension -5 0 Yes         Hip Strength - Planes of Motion    Right Strength Right Pain Left Strength Left  Pain   Flexion (L2) 4+   4-     Extension 3+  (seated) 3-  (seated)   ABduction 3+   4-     ADduction 3+   3+     Internal Rotation 3+   3+     External Rotation 3+   3+           Knee Strength    Right Strength Right Pain Left Strength Left  Pain   Flexion (S2) 4   4-     Prone Flexion           Extension (L3) 4-   3+        Knee Extensor Lag  Lag Present: Left  No Lag: Right     Knee Patellar Screening        Right Patellar Mobility  Normal: Medial,  Lateral, Superior, and Inferior  Left Patellar Mobility  Normal: Medial, Lateral, Superior, and Inferior      Tibial Mobility: hypomobile        Transfers Assessment: Independent     Bed Mobility Assessment: Independent        Sit to Stand Testing  The patient completed 5 sit to stand transfers in 17 sec.             Gait Analysis  Base of Support: Wide  Walking Speed: Normal       Treatment:    CPT  Intervention  Performed    Today  Duration / Intensity    MT  Knee Extension and Flexion Mobilizations          Patellar Mobilizations          Soft Tissue Mobilizations: to distal quadriceps and scar tissue/mobilizations with instrument assistance         TE  PROM Knee Flexion    Performed in supine       Knee Extension Stretch    5 minutes 10 lbs       Recumbent Bike    8 minutes for more functional knee extension and mobility      Prone Quad Stretch    3 minutes      Prone Hip Flexor Stretch with PT assist    2x1 minutes      Re-Assessed Objective Measurements  x        Recumbent Bike  x  6 minutes rocking back and forth     NMR  Quad Sets     8 minutes       Short Arc Quads    3x10 reps 7 lbs       Long Arc Quads    3x10 reps 3 lbs performed in unison      Straight Leg Raises    3x10 reps left       Heel Slides Supine with strap    5 minutes       Heel Slides Sitting with Strap    5 minutes      Glute Sets    3x10 reps       Knee Flexion Stretch with Chair    5 minutes       Knee Flexion Stretch with Step    5 minutes       Standing TKEs    Purple cooks cord 3x10 reps       Bridges    3x10 reps       Side Lying Clams    Green theraband 3x10 reps B              TA  Sit to Stands  x  3x10 reps 10 lbs       Stair Climbing- Step Over  Step 4 steps  x  4 bouts up and down- cueing for sequencing      Side Step Ups at 1st Step  x  2x10 reps B      Standing Hip flexion, Extension and Abduction  x  3x10 reps each B red band      Single Leg Balance  x  3x30 sec holds with use of B UE support      Heel Raises  x  3x10 reps        Standing Marches  x  3x10 reps red band around feet      Side Stepping     Red band around ankles 5 laps back and forth length of mat       Vasopneumatic Pump + Setup    15 minutes low pressure, 48 degrees    PLAN                  CPT Codes available for Billing:    (00) minutes of Manual therapy (MT) to improve pain and ROM.   (12) minutes of Therapeutic Exercise (TE) to develop strength, endurance, range of motion, and flexibility.   (00) minutes of Neuromuscular Re-Education NMR)? to improve: Balance, Coordination, Kinesthetic, Sense, Proprioception, and Posture. Including set up   (40) minutes of Therapeutic Activities (TA) to improve functional performance.   (00) Vasopneumatic Device Therapy () for management of swelling/edema. (66170) Including set up   Unattended Electrical Stimulation (ES) for muscle performance or pain modulation.   BFR: Blood flow restriction applied during exercise     Assessment & Plan   Assessment: Continued to work on functional strengthening. Progressed to step over step gait pattern on stairs today. Patient is progressing well with improving strength in the last month, right LE is actually lagging in certain muscle groups. Emphasized the importance of keeping both LEs strong. Patient will continue to benefit from skilled Physical Therapy to address remaining ROM and strength deficits to allow for more functional independence.  Evaluation/Treatment Tolerance: Patient tolerated treatment well    Patient will continue to benefit from skilled outpatient physical therapy to address the deficits listed in the problem list box on initial evaluation, provide pt/family education and to maximize pt's level of independence in the home and community environment.     Patient's spiritual, cultural, and educational needs considered and patient agreeable to plan of care and goals.       Plan: Continue Plan of Care (POC) and progress per patient tolerance. See treatment section for details  on planned progressions next session.    Goals:   Active       Long Term Goals       Pt will report worst pain of 2/10 in order to progress toward max functional ability and improve quality of life                Start:  02/19/25    Expected End:  04/02/25            Patient will improve strength to at least 4+/5 grossly,  in order to improve functional independence and quality of life.         Start:  02/19/25    Expected End:  04/02/25            Patient will demonstrate improved function as indicated by a score of greater than or equal to 50 out of 100 on FOTO.                Start:  02/19/25    Expected End:  04/02/25               Short Term Goals       Patient will improve knee extension ROM to 0 and knee flexion to 125 degrees in order to progress towards independence with functional activities.   (Progressing)       Start:  02/19/25    Expected End:  03/12/25            Pt will report worst pain of 5/10 in order to progress toward max functional ability and improve quality of life          (Met)       Start:  02/19/25    Expected End:  03/12/25    Resolved:  04/08/25         Patient will demonstrate improved function as indicated by a score of greater than or equal to 30 out of 100 on FOTO.                Start:  02/19/25    Expected End:  03/12/25              Pat Miller, PT

## 2025-04-25 ENCOUNTER — CLINICAL SUPPORT (OUTPATIENT)
Dept: REHABILITATION | Facility: HOSPITAL | Age: 70
End: 2025-04-25
Payer: COMMERCIAL

## 2025-04-25 DIAGNOSIS — R26.89 FUNCTIONAL GAIT ABNORMALITY: ICD-10-CM

## 2025-04-25 DIAGNOSIS — Z74.09 DECREASED STRENGTH, ENDURANCE, AND MOBILITY: ICD-10-CM

## 2025-04-25 DIAGNOSIS — R68.89 DECREASED STRENGTH, ENDURANCE, AND MOBILITY: ICD-10-CM

## 2025-04-25 DIAGNOSIS — M25.662 DECREASED RANGE OF MOTION OF LEFT KNEE: Primary | ICD-10-CM

## 2025-04-25 DIAGNOSIS — R53.1 DECREASED STRENGTH, ENDURANCE, AND MOBILITY: ICD-10-CM

## 2025-04-25 PROCEDURE — 97530 THERAPEUTIC ACTIVITIES: CPT

## 2025-04-25 PROCEDURE — 97110 THERAPEUTIC EXERCISES: CPT

## 2025-04-25 PROCEDURE — 97112 NEUROMUSCULAR REEDUCATION: CPT

## 2025-04-25 NOTE — PROGRESS NOTES
Outpatient Rehab    Physical Therapy Visit    Patient Name: Concepcion Don  MRN: [id number]  YOB: 1955  Today's Date: 4/25/2025    Therapy Diagnosis:   Encounter Diagnoses   Name Primary?    Decreased range of motion of left knee Yes    Decreased strength, endurance, and mobility     Functional gait abnormality        Physician: Willie Melendrez MD    Physician Orders: Eval and Treat  Medical Diagnosis: Arthritis of left knee [M17.12]   Visit # / Visits Authorized:  19 / 20 (+1 for evaluation)   Date of Evaluation:  2/18/2025  Insurance Authorization Period: 2/18/2025 to 12/31/2025  Plan of Care Certification:  4/21/2025 to 6/16/2025     Time In: 0930   Time Out: 1040  Total Time: 70   Total Billable Time: 68     Subjective   Patient reports that she is not currently in pain today..  Pain reported as 0/10. left knee    Objective                Treatment:  CPT  Intervention  Performed    Today  Duration / Intensity    MT  Knee Extension and Flexion Mobilizations          Patellar Mobilizations          Soft Tissue Mobilizations: to distal quadriceps and scar tissue/mobilizations with instrument assistance         TE  PROM Knee Flexion    Performed in supine       Knee Extension Stretch    5 minutes 10 lbs       Recumbent Bike  x  8 minutes for more functional knee extension and mobility      Prone Quad Stretch    3 minutes      Prone Hip Flexor Stretch with PT assist    2x1 minutes      Re-Assessed Objective Measurements                  NMR  Quad Sets     8 minutes       Short Arc Quads    3x10 reps 7 lbs       Long Arc Quads    3x10 reps 3 lbs performed in unison      Straight Leg Raises  x  3x10 reps bilateral      Heel Slides Supine with strap    5 minutes       Heel Slides Sitting with Strap    5 minutes      Glute Sets    3x10 reps       Knee Flexion Stretch with Chair    5 minutes       Knee Flexion Stretch with Step    5 minutes       Standing TKEs    Purple cooks cord 3x10 reps       Bridges  x   3x10 reps       Side Lying Clams  x  Green theraband 3x10 reps B      Leg Press  x  15 lbs 3x10 reps     TA  Sit to Stands    3x10 reps 10 lbs       Stair Climbing- Step Over  Step 4 steps    4 bouts up and down- cueing for sequencing      Side Step Ups at 1st Step    2x10 reps B      Standing Hip flexion, Extension and Abduction  x  3x10 reps each B red band      Single Leg Balance  x  3x30 sec holds with use of B UE support      Heel Raises  x  3x10 reps       Standing Marches  x  3x10 reps red band around feet      Side Stepping     Red band around ankles 5 laps back and forth length of mat       Vasopneumatic Pump + Setup    15 minutes low pressure, 48 degrees    PLAN                  CPT Codes available for Billing:    (00) minutes of Manual therapy (MT) to improve pain and ROM.   (08) minutes of Therapeutic Exercise (TE) to develop strength, endurance, range of motion, and flexibility.   (20) minutes of Neuromuscular Re-Education NMR)? to improve: Balance, Coordination, Kinesthetic, Sense, Proprioception, and Posture. Including set up   (40) minutes of Therapeutic Activities (TA) to improve functional performance.   (00) Vasopneumatic Device Therapy () for management of swelling/edema. (95708) Including set up   Unattended Electrical Stimulation (ES) for muscle performance or pain modulation.   BFR: Blood flow restriction applied during exercise     Assessment & Plan   Assessment: Continued to work on improving lower extremity strength and conditioning. Incorporated leg press but was only able to tolerate light weight. Will continue to progress weight as able as we progress towards discharge.  Evaluation/Treatment Tolerance: Patient tolerated treatment well    Patient will continue to benefit from skilled outpatient physical therapy to address the deficits listed in the problem list box on initial evaluation, provide pt/family education and to maximize pt's level of independence in the home and community  environment.     Patient's spiritual, cultural, and educational needs considered and patient agreeable to plan of care and goals.       Plan: Continue Plan of Care (POC) and progress per patient tolerance. See treatment section for details on planned progressions next session.    Goals:   Active       Long Term Goals       Pt will report worst pain of 2/10 in order to progress toward max functional ability and improve quality of life                Start:  02/19/25    Expected End:  04/02/25            Patient will improve strength to at least 4+/5 grossly,  in order to improve functional independence and quality of life.         Start:  02/19/25    Expected End:  04/02/25            Patient will demonstrate improved function as indicated by a score of greater than or equal to 50 out of 100 on FOTO.                Start:  02/19/25    Expected End:  04/02/25               Short Term Goals       Patient will improve knee extension ROM to 0 and knee flexion to 125 degrees in order to progress towards independence with functional activities.   (Progressing)       Start:  02/19/25    Expected End:  03/12/25            Pt will report worst pain of 5/10 in order to progress toward max functional ability and improve quality of life          (Met)       Start:  02/19/25    Expected End:  03/12/25    Resolved:  04/08/25         Patient will demonstrate improved function as indicated by a score of greater than or equal to 30 out of 100 on FOTO.                Start:  02/19/25    Expected End:  03/12/25              Pat Miller, PT

## 2025-04-28 ENCOUNTER — CLINICAL SUPPORT (OUTPATIENT)
Dept: REHABILITATION | Facility: HOSPITAL | Age: 70
End: 2025-04-28
Payer: COMMERCIAL

## 2025-04-28 DIAGNOSIS — R68.89 DECREASED STRENGTH, ENDURANCE, AND MOBILITY: ICD-10-CM

## 2025-04-28 DIAGNOSIS — Z74.09 DECREASED STRENGTH, ENDURANCE, AND MOBILITY: ICD-10-CM

## 2025-04-28 DIAGNOSIS — R26.89 FUNCTIONAL GAIT ABNORMALITY: ICD-10-CM

## 2025-04-28 DIAGNOSIS — M25.662 DECREASED RANGE OF MOTION OF LEFT KNEE: Primary | ICD-10-CM

## 2025-04-28 DIAGNOSIS — R53.1 DECREASED STRENGTH, ENDURANCE, AND MOBILITY: ICD-10-CM

## 2025-04-28 PROCEDURE — 97530 THERAPEUTIC ACTIVITIES: CPT

## 2025-04-28 PROCEDURE — 97140 MANUAL THERAPY 1/> REGIONS: CPT

## 2025-04-28 PROCEDURE — 97112 NEUROMUSCULAR REEDUCATION: CPT

## 2025-04-28 PROCEDURE — 97110 THERAPEUTIC EXERCISES: CPT

## 2025-04-28 NOTE — PROGRESS NOTES
Outpatient Rehab    Physical Therapy Visit    Patient Name: Concepcion Don  MRN: [id number]  YOB: 1955  Today's Date: 4/28/2025    Therapy Diagnosis:   Encounter Diagnoses   Name Primary?    Decreased range of motion of left knee Yes    Decreased strength, endurance, and mobility     Functional gait abnormality        Physician: Willie Melendrez MD    Physician Orders: Eval and Treat  Medical Diagnosis: Arthritis of left knee [M17.12]   Visit # / Visits Authorized:  19 / 20 (+1 for evaluation)   Date of Evaluation:  2/18/2025  Insurance Authorization Period: 2/18/2025 to 12/31/2025  Plan of Care Certification:  4/21/2025 to 6/16/2025     Time In: 1106   Time Out: 1206  Total Time: 60   Total Billable Time: 60     Subjective   Patient reports that she is having burning and stinking sensation along the medial portion of her incision but would not describe this as pain..  Pain reported as 0/10. left knee    Objective            Treatment:  CPT  Intervention  Performed    Today  Duration / Intensity    MT  Knee Extension and Flexion Mobilizations          Patellar Mobilizations          Soft Tissue Mobilizations: to distal quadriceps and scar tissue/mobilizations with instrument assistance   x      TE  PROM Knee Flexion  x  Performed in sitting      Knee Extension Stretch    5 minutes 10 lbs       Recumbent Bike  x  8 minutes for more functional knee extension and mobility      Prone Quad Stretch    3 minutes      Prone Hip Flexor Stretch with PT assist    2x1 minutes      Re-Assessed Objective Measurements                  NMR  Quad Sets     8 minutes       Short Arc Quads    3x10 reps 7 lbs       Long Arc Quads    3x10 reps 3 lbs performed in unison      Straight Leg Raises  x  3x10 reps bilateral      Heel Slides Supine with strap    5 minutes       Heel Slides Sitting with Strap    5 minutes      Glute Sets    3x10 reps       Knee Flexion Stretch with Chair    5 minutes       Knee Flexion Stretch with  Step    5 minutes       Standing TKEs    Purple cooks cord 3x10 reps       Bridges  x  3x10 reps       Side Lying Clams    Green theraband 3x10 reps B      Leg Press  x  75 lbs 3x10 reps       Knee Extension Machine  x  15 lbs 3x10 reps       Knee Flexion Machine  x  25 lbs 3x10 reps     TA  Sit to Stands    3x10 reps 10 lbs       Stair Climbing- Step Over  Step 4 steps    4 bouts up and down- cueing for sequencing      Side Step Ups at 1st Step    2x10 reps B      Standing Hip flexion, Extension and Abduction  x  3x10 reps each B red band      Single Leg Balance    3x30 sec holds with use of B UE support      Heel Raises  x  3x10 reps       Standing Marches  x  3x10 reps red band around feet      Side Stepping     Red band around ankles 5 laps back and forth length of mat       Vasopneumatic Pump + Setup    15 minutes low pressure, 48 degrees    PLAN                  CPT Codes available for Billing:    (08) minutes of Manual therapy (MT) to improve pain and ROM.   (10) minutes of Therapeutic Exercise (TE) to develop strength, endurance, range of motion, and flexibility.   (27) minutes of Neuromuscular Re-Education NMR)? to improve: Balance, Coordination, Kinesthetic, Sense, Proprioception, and Posture.   (15) minutes of Therapeutic Activities (TA) to improve functional performance.   (00) Vasopneumatic Device Therapy () for management of swelling/edema. (65495) Including set up   Unattended Electrical Stimulation (ES) for muscle performance or pain modulation.   BFR: Blood flow restriction applied during exercise     Assessment & Plan   Assessment: Incorporated more machine strengthening today to progress strength program. Continued to work on previous functional strengthening with cueing to improve range and form throughout. Worked on quadriceps and scar mobility to assist with knee flexion mobility and pain present.  Evaluation/Treatment Tolerance: Patient tolerated treatment well    Patient will continue to  benefit from skilled outpatient physical therapy to address the deficits listed in the problem list box on initial evaluation, provide pt/family education and to maximize pt's level of independence in the home and community environment.     Patient's spiritual, cultural, and educational needs considered and patient agreeable to plan of care and goals.       Plan: Continue Plan of Care (POC) and progress per patient tolerance. See treatment section for details on planned progressions next session.    Goals:   Active       Long Term Goals       Pt will report worst pain of 2/10 in order to progress toward max functional ability and improve quality of life                Start:  02/19/25    Expected End:  04/02/25            Patient will improve strength to at least 4+/5 grossly,  in order to improve functional independence and quality of life.         Start:  02/19/25    Expected End:  04/02/25            Patient will demonstrate improved function as indicated by a score of greater than or equal to 50 out of 100 on FOTO.                Start:  02/19/25    Expected End:  04/02/25               Short Term Goals       Patient will improve knee extension ROM to 0 and knee flexion to 125 degrees in order to progress towards independence with functional activities.   (Progressing)       Start:  02/19/25    Expected End:  03/12/25            Pt will report worst pain of 5/10 in order to progress toward max functional ability and improve quality of life          (Met)       Start:  02/19/25    Expected End:  03/12/25    Resolved:  04/08/25         Patient will demonstrate improved function as indicated by a score of greater than or equal to 30 out of 100 on FOTO.                Start:  02/19/25    Expected End:  03/12/25              Pat Miller, PT

## 2025-05-02 ENCOUNTER — CLINICAL SUPPORT (OUTPATIENT)
Dept: REHABILITATION | Facility: HOSPITAL | Age: 70
End: 2025-05-02
Payer: COMMERCIAL

## 2025-05-02 DIAGNOSIS — M25.662 DECREASED RANGE OF MOTION OF LEFT KNEE: Primary | ICD-10-CM

## 2025-05-02 DIAGNOSIS — Z74.09 DECREASED STRENGTH, ENDURANCE, AND MOBILITY: ICD-10-CM

## 2025-05-02 DIAGNOSIS — R53.1 DECREASED STRENGTH, ENDURANCE, AND MOBILITY: ICD-10-CM

## 2025-05-02 DIAGNOSIS — R26.89 FUNCTIONAL GAIT ABNORMALITY: ICD-10-CM

## 2025-05-02 DIAGNOSIS — R68.89 DECREASED STRENGTH, ENDURANCE, AND MOBILITY: ICD-10-CM

## 2025-05-02 PROCEDURE — 97110 THERAPEUTIC EXERCISES: CPT

## 2025-05-02 PROCEDURE — 97530 THERAPEUTIC ACTIVITIES: CPT

## 2025-05-02 PROCEDURE — 97112 NEUROMUSCULAR REEDUCATION: CPT

## 2025-05-02 NOTE — PROGRESS NOTES
Outpatient Rehab    Physical Therapy Visit    Patient Name: Concepcion Don  MRN: [id number]  YOB: 1955  Today's Date: 5/2/2025    Therapy Diagnosis:   Encounter Diagnoses   Name Primary?    Decreased range of motion of left knee Yes    Decreased strength, endurance, and mobility     Functional gait abnormality        Physician: Willie Melendrez MD    Physician Orders: Eval and Treat  Medical Diagnosis: Arthritis of left knee [M17.12]   Visit # / Visits Authorized:  19 / 20 (+1 for evaluation)   Date of Evaluation:  2/18/2025  Insurance Authorization Period: 2/18/2025 to 12/31/2025  Plan of Care Certification:  4/21/2025 to 6/16/2025     Time In: 0937   Time Out: 1040  Total Time: 63   Total Billable Time: 63     Subjective   Patient reports that she is feeling achy today because of the change in weather..  Pain reported as 2/10. left knee    Objective            Treatment:    CPT  Intervention  Performed    Today  Duration / Intensity    MT  Knee Extension and Flexion Mobilizations          Patellar Mobilizations          Soft Tissue Mobilizations: to distal quadriceps and scar tissue/mobilizations with instrument assistance         TE  PROM Knee Flexion    Performed in sitting      Knee Extension Stretch    5 minutes 10 lbs       Recumbent Bike  x  8 minutes for more functional knee extension and mobility      Prone Quad Stretch    3 minutes      Prone Hip Flexor Stretch with PT assist    2x1 minutes      Re-Assessed Objective Measurements                  NMR  Quad Sets     8 minutes       Short Arc Quads    3x10 reps 7 lbs       Long Arc Quads    3x10 reps 3 lbs performed in unison      Straight Leg Raises  x  3x10 reps bilateral      Heel Slides Supine with strap    5 minutes       Heel Slides Sitting with Strap    5 minutes      Glute Sets    3x10 reps       Knee Flexion Stretch with Chair    5 minutes       Knee Flexion Stretch with Step    5 minutes       Standing TKEs    Purple cooks cord 3x10  reps       Bridges  x  3x10 reps       Side Lying Clams    Green theraband 3x10 reps B      Leg Press  x  85 lbs 3x10 reps       Knee Extension Machine  x  25 lbs 3x10 reps       Knee Flexion Machine  x  25 lbs 3x10 reps     TA  Sit to Stands  X  3x10 reps 10 lbs       Stair Climbing- Step Over Step 4 steps  X  5 bouts up and down- cueing for sequencing      Side Step Ups at 1st Step  x  2x10 reps B      Standing Hip flexion, Extension and Abduction  x  3x10 reps each B red band      Single Leg Balance    3x30 sec holds with use of B UE support      Heel Raises  x  3x10 reps       Toe Raises- Standing  x  3x10 reps       Standing Marches  x  3x10 reps red band around feet      Side Stepping     Red band around ankles 5 laps back and forth length of mat       Vasopneumatic Pump + Setup    15 minutes low pressure, 48 degrees    PLAN                  CPT Codes available for Billing:    (00) minutes of Manual therapy (MT) to improve pain and ROM.   (08) minutes of Therapeutic Exercise (TE) to develop strength, endurance, range of motion, and flexibility.   (23) minutes of Neuromuscular Re-Education NMR)? to improve: Balance, Coordination, Kinesthetic, Sense, Proprioception, and Posture. Including set up   (32) minutes of Therapeutic Activities (TA) to improve functional performance.   (00) Vasopneumatic Device Therapy () for management of swelling/edema. (19030) Including set up   Unattended Electrical Stimulation (ES) for muscle performance or pain modulation.   BFR: Blood flow restriction applied during exercise     Assessment & Plan   Assessment: Continued to work on both machine and functional strengthening to improve overall functional independence. Pretty significant muscular fatigue progressively as the session progressed. Cueing provided throughout for sequencing and to decrease compensations present.  Evaluation/Treatment Tolerance: Patient tolerated treatment well    Patient will continue to benefit from  skilled outpatient physical therapy to address the deficits listed in the problem list box on initial evaluation, provide pt/family education and to maximize pt's level of independence in the home and community environment.     Patient's spiritual, cultural, and educational needs considered and patient agreeable to plan of care and goals.       Plan: Continue Plan of Care (POC) and progress per patient tolerance. See treatment section for details on planned progressions next session.    Goals:   Active       Long Term Goals       Pt will report worst pain of 2/10 in order to progress toward max functional ability and improve quality of life                Start:  02/19/25    Expected End:  04/02/25            Patient will improve strength to at least 4+/5 grossly,  in order to improve functional independence and quality of life.         Start:  02/19/25    Expected End:  04/02/25            Patient will demonstrate improved function as indicated by a score of greater than or equal to 50 out of 100 on FOTO.                Start:  02/19/25    Expected End:  04/02/25               Short Term Goals       Patient will improve knee extension ROM to 0 and knee flexion to 125 degrees in order to progress towards independence with functional activities.   (Progressing)       Start:  02/19/25    Expected End:  03/12/25            Pt will report worst pain of 5/10 in order to progress toward max functional ability and improve quality of life          (Met)       Start:  02/19/25    Expected End:  03/12/25    Resolved:  04/08/25         Patient will demonstrate improved function as indicated by a score of greater than or equal to 30 out of 100 on FOTO.                Start:  02/19/25    Expected End:  03/12/25              Pat Miller, PT

## 2025-05-05 ENCOUNTER — CLINICAL SUPPORT (OUTPATIENT)
Dept: REHABILITATION | Facility: HOSPITAL | Age: 70
End: 2025-05-05
Payer: COMMERCIAL

## 2025-05-05 DIAGNOSIS — R53.1 DECREASED STRENGTH, ENDURANCE, AND MOBILITY: ICD-10-CM

## 2025-05-05 DIAGNOSIS — Z74.09 DECREASED STRENGTH, ENDURANCE, AND MOBILITY: ICD-10-CM

## 2025-05-05 DIAGNOSIS — M25.662 DECREASED RANGE OF MOTION OF LEFT KNEE: Primary | ICD-10-CM

## 2025-05-05 DIAGNOSIS — R68.89 DECREASED STRENGTH, ENDURANCE, AND MOBILITY: ICD-10-CM

## 2025-05-05 DIAGNOSIS — R26.89 FUNCTIONAL GAIT ABNORMALITY: ICD-10-CM

## 2025-05-05 PROCEDURE — 97112 NEUROMUSCULAR REEDUCATION: CPT

## 2025-05-05 PROCEDURE — 97530 THERAPEUTIC ACTIVITIES: CPT

## 2025-05-05 PROCEDURE — 97110 THERAPEUTIC EXERCISES: CPT

## 2025-05-06 NOTE — PROGRESS NOTES
Outpatient Rehab    Physical Therapy Visit    Patient Name: Concepcion Don  MRN: [id number]  YOB: 1955  Today's Date: 5/6/2025    Therapy Diagnosis:   Encounter Diagnoses   Name Primary?    Decreased range of motion of left knee Yes    Decreased strength, endurance, and mobility     Functional gait abnormality        Physician: Willie Melendrez MD    Physician Orders: Eval and Treat  Medical Diagnosis: Arthritis of left knee [M17.12]   Visit # / Visits Authorized:  22 / 30 (+1 for evaluation)   Date of Evaluation:  2/18/2025  Insurance Authorization Period: 2/18/2025 to 12/31/2025  Plan of Care Certification:  4/21/2025 to 6/16/2025     Time In: 1100   Time Out: 1205  Total Time: 65   Total Billable Time: 65     Subjective   Patient reports that she is not in any pain today, just stiffness from the drop in temperature.  Pain reported as 0/10. left knee    Objective            Treatment:  CPT  Intervention  Performed    Today  Duration / Intensity    MT  Knee Extension and Flexion Mobilizations          Patellar Mobilizations          Soft Tissue Mobilizations: to distal quadriceps and scar tissue/mobilizations with instrument assistance         TE  PROM Knee Flexion    Performed in sitting      Knee Extension Stretch    5 minutes 10 lbs       Recumbent Bike  x  8 minutes for more functional knee extension and mobility      Prone Quad Stretch    3 minutes      Prone Hip Flexor Stretch with PT assist    2x1 minutes      Re-Assessed Objective Measurements                  NMR  Quad Sets     8 minutes       Short Arc Quads    3x10 reps 7 lbs       Long Arc Quads    3x10 reps 3 lbs performed in unison      Straight Leg Raises    3x10 reps bilateral      Heel Slides Supine with strap    5 minutes       Heel Slides Sitting with Strap    5 minutes      Glute Sets    3x10 reps       Knee Flexion Stretch with Chair    5 minutes       Knee Flexion Stretch with Step    5 minutes       Standing TKEs Purple cooks  cord 3x10 reps       Bridges    3x10 reps       Side Lying Clams    Green theraband 3x10 reps B      Leg Press    85 lbs 3x10 reps       Knee Extension Machine  x  25 lbs 3x10 reps       Knee Flexion Machine  x  35 lbs 3x10 reps     TA  Sit to Stands  X  3x10 reps 10 lbs       Stair Climbing- Step Over Step 4 steps  X  5 bouts up and down- cueing for sequencing      Side Step Ups at 1st Step  x  2x10 reps B      Standing Hip flexion, Extension and Abduction  x  3x10 reps each B blue loop      Single Leg Balance  x  3x30 sec holds with use of B UE support      Heel Raises  x  3x10 reps       Toe Raises- Standing  x  3x10 reps       Standing Marches  x  3x10 reps red band around feet      Side Stepping     Red band around ankles 5 laps back and forth length of mat       Vasopneumatic Pump + Setup    15 minutes low pressure, 48 degrees    PLAN                  CPT Codes available for Billing:    (00) minutes of Manual therapy (MT) to improve pain and ROM.   (08) minutes of Therapeutic Exercise (TE) to develop strength, endurance, range of motion, and flexibility.   (10) minutes of Neuromuscular Re-Education NMR)? to improve: Balance, Coordination, Kinesthetic, Sense, Proprioception, and Posture. Including set up   (47) minutes of Therapeutic Activities (TA) to improve functional performance.   (00) Vasopneumatic Device Therapy () for management of swelling/edema. (12512) Including set up   Unattended Electrical Stimulation (ES) for muscle performance or pain modulation.   BFR: Blood flow restriction applied during exercise     Assessment & Plan   Assessment: Was able to increased weight for hamstring curls and increase band resistance for standing hip series today noting improving strength and endurance. Discussed discharging after next visit with the patient in agreeance as her knee is now more mobile and functional. Discussed that she would need to continue with regular physical activity upon discharge to  maintain progress seen in therapy.  Evaluation/Treatment Tolerance: Patient tolerated treatment well    Patient will continue to benefit from skilled outpatient physical therapy to address the deficits listed in the problem list box on initial evaluation, provide pt/family education and to maximize pt's level of independence in the home and community environment.     Patient's spiritual, cultural, and educational needs considered and patient agreeable to plan of care and goals.       Plan: Continue Plan of Care (POC) and progress per patient tolerance. See treatment section for details on planned progressions next session.    Goals:   Active       Long Term Goals       Pt will report worst pain of 2/10 in order to progress toward max functional ability and improve quality of life                Start:  02/19/25    Expected End:  04/02/25            Patient will improve strength to at least 4+/5 grossly,  in order to improve functional independence and quality of life.         Start:  02/19/25    Expected End:  04/02/25            Patient will demonstrate improved function as indicated by a score of greater than or equal to 50 out of 100 on FOTO.                Start:  02/19/25    Expected End:  04/02/25               Short Term Goals       Patient will improve knee extension ROM to 0 and knee flexion to 125 degrees in order to progress towards independence with functional activities.   (Progressing)       Start:  02/19/25    Expected End:  03/12/25            Pt will report worst pain of 5/10 in order to progress toward max functional ability and improve quality of life          (Met)       Start:  02/19/25    Expected End:  03/12/25    Resolved:  04/08/25         Patient will demonstrate improved function as indicated by a score of greater than or equal to 30 out of 100 on FOTO.                Start:  02/19/25    Expected End:  03/12/25              Pat Miller, PT

## 2025-05-09 ENCOUNTER — CLINICAL SUPPORT (OUTPATIENT)
Dept: REHABILITATION | Facility: HOSPITAL | Age: 70
End: 2025-05-09
Payer: COMMERCIAL

## 2025-05-09 DIAGNOSIS — Z74.09 DECREASED STRENGTH, ENDURANCE, AND MOBILITY: ICD-10-CM

## 2025-05-09 DIAGNOSIS — R68.89 DECREASED STRENGTH, ENDURANCE, AND MOBILITY: ICD-10-CM

## 2025-05-09 DIAGNOSIS — R26.89 FUNCTIONAL GAIT ABNORMALITY: ICD-10-CM

## 2025-05-09 DIAGNOSIS — R53.1 DECREASED STRENGTH, ENDURANCE, AND MOBILITY: ICD-10-CM

## 2025-05-09 DIAGNOSIS — M25.662 DECREASED RANGE OF MOTION OF LEFT KNEE: Primary | ICD-10-CM

## 2025-05-09 PROCEDURE — 97110 THERAPEUTIC EXERCISES: CPT

## 2025-05-09 PROCEDURE — 97140 MANUAL THERAPY 1/> REGIONS: CPT

## 2025-05-09 NOTE — PROGRESS NOTES
Outpatient Rehab  Physical Therapy Discharge  Physical Therapy Visit    Patient Name: Concepcion Don  MRN: [id number]  YOB: 1955  Today's Date: 5/9/2025    Therapy Diagnosis:   Encounter Diagnoses   Name Primary?    Decreased range of motion of left knee Yes    Decreased strength, endurance, and mobility     Functional gait abnormality        Physician: Willie Melendrez MD    Physician Orders: Eval and Treat  Medical Diagnosis: Arthritis of left knee [M17.12]   Visit # / Visits Authorized:  22 / 30 (+1 for evaluation)   Date of Evaluation:  2/18/2025  Insurance Authorization Period: 2/18/2025 to 12/31/2025  Plan of Care Certification:  4/21/2025 to 6/16/2025     Time In: 0923   Time Out: 1003  Total Time: 40   Total Billable Time: 40     Subjective   Patient reports that she is stiff with this weather but in general is not having any pain. Patient reports that she feels ready for discharge today..  Pain reported as 0/10. left knee    Objective         Posture          Knee Observations  Right Knee Observations  Not Present: Straight Leg Raise Extensor Lag  Left Knee Observations  Not Present: Straight Leg Raise Extensor Lag        Knee Range of Motion   Right Knee    Active (deg) Passive (deg) Pain   Flexion 103       Extension 0             Left Knee    Active (deg) Passive (deg) Pain   Flexion 95 100 stiffness   Extension 0 0          Hip Strength - Planes of Motion    Right Strength Right Pain Left Strength Left  Pain   Flexion (L2) 4+   4+     Extension 4   4    ABduction 4   4     ADduction 4   4     Internal Rotation 4+   4+     External Rotation 4+   4 Pain left          Knee Strength    Right Strength Right Pain Left Strength Left  Pain   Flexion (S2) 4   4     Prone Flexion           Extension (L3) 4+   4+         Knee Patellar Screening: Normal     Tibial Mobility: Normal     Transfers Assessment: Independent     Bed Mobility Assessment: Independent        Sit to Stand Testing  The patient  completed 5 sit to stand transfers in 14.89 sec.             Gait Analysis  Base of Support: Wide  Walking Speed: Normal      Treatment:    CPT  Intervention  Performed    Today  Duration / Intensity    MT  Knee Extension and Flexion Mobilizations          Patellar Mobilizations          Soft Tissue Mobilizations: left medial hamstrings, adductors,   x      TE  PROM Knee Flexion  x        Knee Extension Stretch    5 minutes 10 lbs       Recumbent Bike  x  8 minutes for more functional knee extension and mobility      Prone Quad Stretch    3 minutes      Prone Hip Flexor Stretch with PT assist    2x1 minutes      Re-Assessed Objective Measurements  x                NMR  Quad Sets     8 minutes       Short Arc Quads    3x10 reps 7 lbs       Long Arc Quads    3x10 reps 3 lbs performed in unison      Straight Leg Raises    3x10 reps bilateral      Heel Slides Supine with strap    5 minutes       Heel Slides Sitting with Strap    5 minutes      Glute Sets    3x10 reps       Knee Flexion Stretch with Chair    5 minutes       Knee Flexion Stretch with Step    5 minutes       Standing TKEs    Purple cooks cord 3x10 reps       Bridges    3x10 reps       Side Lying Clams    Green theraband 3x10 reps B      Leg Press    85 lbs 3x10 reps       Knee Extension Machine    25 lbs 3x10 reps       Knee Flexion Machine    35 lbs 3x10 reps     TA  Sit to Stands    3x10 reps 10 lbs       Stair Climbing- Step Over Step 4 steps    5 bouts up and down- cueing for sequencing      Side Step Ups at 1st Step    2x10 reps B      Standing Hip flexion, Extension and Abduction    3x10 reps each B blue loop      Single Leg Balance    3x30 sec holds with use of B UE support      Heel Raises    3x10 reps       Toe Raises- Standing    3x10 reps       Standing Marches    3x10 reps red band around feet      Side Stepping     Red band around ankles 5 laps back and forth length of mat       Vasopneumatic Pump + Setup    15 minutes low pressure, 48  degrees    PLAN                  CPT Codes available for Billing:    (08) minutes of Manual therapy (MT) to improve pain and ROM.   (32) minutes of Therapeutic Exercise (TE) to develop strength, endurance, range of motion, and flexibility.   (00) minutes of Neuromuscular Re-Education NMR)? to improve: Balance, Coordination, Kinesthetic, Sense, Proprioception, and Posture. Including set up   (00) minutes of Therapeutic Activities (TA) to improve functional performance.   (00) Vasopneumatic Device Therapy () for management of swelling/edema. (42161) Including set up   Unattended Electrical Stimulation (ES) for muscle performance or pain modulation.   BFR: Blood flow restriction applied during exercise     Assessment & Plan   Assessment: Patient has made good progress since beginning Physical Therapy in regards to ROM and strength. Patient's knee flexion mobility is still limited from norms but is near equivalent to contralateral side. Discussed her plan to continue mobility and strengthening at the gym beginning next week to maintain health of knees over the coming years.  Evaluation/Treatment Tolerance: Patient tolerated treatment well    Patient will continue to benefit from skilled outpatient physical therapy to address the deficits listed in the problem list box on initial evaluation, provide pt/family education and to maximize pt's level of independence in the home and community environment.     Patient's spiritual, cultural, and educational needs considered and patient agreeable to plan of care and goals.       Plan: Discharge today to home program and gym membership.    Goals:   Active       Long Term Goals       Pt will report worst pain of 2/10 in order to progress toward max functional ability and improve quality of life          (Met)       Start:  02/19/25    Expected End:  04/02/25    Resolved:  05/09/25         Patient will improve strength to at least 4+/5 grossly,  in order to improve functional  independence and quality of life.   (Adequate for Care Transition)       Start:  02/19/25    Expected End:  04/02/25            Patient will demonstrate improved function as indicated by a score of greater than or equal to 50 out of 100 on FOTO.          (Met)       Start:  02/19/25    Expected End:  04/02/25    Resolved:  05/09/25            Short Term Goals       Patient will improve knee extension ROM to 0 and knee flexion to 125 degrees in order to progress towards independence with functional activities.   (Adequate for Care Transition)       Start:  02/19/25    Expected End:  03/12/25            Pt will report worst pain of 5/10 in order to progress toward max functional ability and improve quality of life          (Met)       Start:  02/19/25    Expected End:  03/12/25    Resolved:  04/08/25         Patient will demonstrate improved function as indicated by a score of greater than or equal to 30 out of 100 on FOTO.          (Met)       Start:  02/19/25    Expected End:  03/12/25    Resolved:  05/09/25           Pat Miller, PT

## 2025-08-26 ENCOUNTER — HOSPITAL ENCOUNTER (EMERGENCY)
Facility: HOSPITAL | Age: 70
Discharge: HOME | End: 2025-08-26
Attending: EMERGENCY MEDICINE
Payer: COMMERCIAL